# Patient Record
Sex: FEMALE | Race: BLACK OR AFRICAN AMERICAN | Employment: OTHER | URBAN - METROPOLITAN AREA
[De-identification: names, ages, dates, MRNs, and addresses within clinical notes are randomized per-mention and may not be internally consistent; named-entity substitution may affect disease eponyms.]

---

## 2017-01-12 ENCOUNTER — HOSPITAL ENCOUNTER (EMERGENCY)
Age: 72
Discharge: HOME | End: 2017-01-12
Attending: EMERGENCY MEDICINE | Admitting: EMERGENCY MEDICINE
Payer: MEDICARE

## 2017-01-12 VITALS
HEIGHT: 65 IN | RESPIRATION RATE: 18 BRPM | OXYGEN SATURATION: 99 % | HEART RATE: 88 BPM | TEMPERATURE: 97.6 F | SYSTOLIC BLOOD PRESSURE: 150 MMHG | WEIGHT: 220 LBS | BODY MASS INDEX: 36.65 KG/M2 | DIASTOLIC BLOOD PRESSURE: 77 MMHG
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
## 2017-01-12 DIAGNOSIS — S20.01XA: Primary | ICD-10-CM

## 2017-01-12 PROCEDURE — 99283 EMERGENCY DEPT VISIT LOW MDM: CPT
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
## 2017-01-12 NOTE — ED NOTES
Pt evaluated by DR. Hatch and D/C [place]. Patient (s)  given copy of dc instructions and 2 script(s). Patient(s)  verbalized understanding of instructions and script (s). Patient given a current medication reconciliation form and verbalized understanding of their medications. Patient (s) verbalized understanding of the importance of discussing medications with  his or her physician or clinic when they follow up. Patient alert and oriented and in no acute distress. Pt verbalizes pain scale of 0 out of 10. Patient discharged home ambulatory with self  .
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
## 2017-01-16 ENCOUNTER — OFFICE VISIT (OUTPATIENT)
Dept: FAMILY MEDICINE CLINIC | Age: 72
End: 2017-01-16

## 2017-01-16 VITALS
HEIGHT: 65 IN | RESPIRATION RATE: 17 BRPM | TEMPERATURE: 97 F | BODY MASS INDEX: 38.49 KG/M2 | DIASTOLIC BLOOD PRESSURE: 73 MMHG | WEIGHT: 231 LBS | HEART RATE: 91 BPM | OXYGEN SATURATION: 98 % | SYSTOLIC BLOOD PRESSURE: 140 MMHG
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
## 2017-01-16 DIAGNOSIS — T14.8XXA BRUISE: ICD-10-CM

## 2017-01-16 DIAGNOSIS — S20.00XA BRUISE OF BREAST: Primary | ICD-10-CM

## 2017-01-16 NOTE — PATIENT INSTRUCTIONS
Bruises: Care Instructions  Your Care Instructions    Bruises occur when small blood vessels under the skin tear or rupture, most often from a twist, bump, or fall. Blood leaks into tissues under the skin and causes a black-and-blue spot that often turns colors, including purplish black, reddish blue, or yellowish green, as the bruise heals. Bruises hurt, but most are not serious and will go away on their own within 2 to 4 weeks. Sometimes, gravity causes them to spread down the body. A leg bruise usually will take longer to heal than a bruise on the face or arms. Follow-up care is a key part of your treatment and safety. Be sure to make and go to all appointments, and call your doctor if you are having problems. Its also a good idea to know your test results and keep a list of the medicines you take. How can you care for yourself at home? · Take pain medicines exactly as directed. ¨ If the doctor gave you a prescription medicine for pain, take it as prescribed. ¨ If you are not taking a prescription pain medicine, ask your doctor if you can take an over-the-counter medicine. · Put ice or a cold pack on the area for 10 to 20 minutes at a time. Put a thin cloth between the ice and your skin. · If you can, prop up the bruised area on pillows as much as possible for the next few days. Try to keep the bruise above the level of your heart. When should you call for help? Call your doctor now or seek immediate medical care if:  · You have signs of infection, such as:  ¨ Increased pain, swelling, warmth, or redness. ¨ Red streaks leading from the bruise. ¨ Pus draining from the bruise. ¨ A fever. · You have a bruise on your leg and signs of a blood clot, such as:  ¨ Increasing redness and swelling along with warmth, tenderness, and pain in the bruised area. ¨ Pain in your calf, back of the knee, thigh, or groin. ¨ Redness and swelling in your leg or groin. · Your pain gets worse.   Watch closely for changes in your health, and be sure to contact your doctor if:  · You do not get better as expected. Where can you learn more? Go to http://afshan-nikki.info/. Enter [phone] in the search box to learn more about \"Bruises: Care Instructions. \"  Current as of: May 27, 2016  Content Version: 11.1  © 8440-9217 [employer]. Care instructions adapted under license by [employer] (which disclaims liability or warranty for this information). If you have questions about a medical condition or this instruction, always ask your healthcare professional. [place] [address] any warranty or liability for your use of this information. Chest Contusion: Care Instructions  Your Care Instructions  A chest contusion, or bruise, is caused by a fall or direct blow to the chest. Car crashes, falls, getting punched, and injury from bicycle handlebars are common causes of chest contusions. A very forceful blow to the chest can injure the heart or blood vessels in the chest, the lungs, the airway, the liver, or the spleen. Pain may be caused by an injury to muscles, cartilage, or ribs. Deep breathing, coughing, or sneezing can increase your pain. Lying on the injured area also can cause pain. Follow-up care is a key part of your treatment and safety. Be sure to make and go to all appointments, and call your doctor if you are having problems. It's also a good idea to know your test results and keep a list of the medicines you take. How can you care for yourself at home? · Rest and protect the injured or sore area. Stop, change, or take a break from any activity that may be causing your pain. · Put ice or a cold pack on the area for 10 to 20 minutes at a time. Put a thin cloth between the ice and your skin.   · After 2 or 3 days, if your swelling is gone, apply a heating pad set on low or a warm cloth to your chest. Some doctors suggest that you go back and forth between hot and cold. Put a thin cloth between the heating pad and your skin. · Do not wrap or tape your ribs for support. This may cause you to take smaller breaths, which could increase your risk of pneumonia and lung collapse. · Ask your doctor if you can take an over-the-counter pain medicine, such as acetaminophen (Tylenol), ibuprofen (Advil, Motrin), or naproxen (Aleve). Be safe with medicines. Read and follow all instructions on the label. · Take your medicines exactly as prescribed. Call your doctor if you think you are having a problem with your medicine. · Gentle stretching and massage may help you feel better after a few days of rest. Stretch slowly to the point just before discomfort begins, then hold the stretch for at least 15 to 30 seconds. Do this 3 or 4 times per day. · As your pain gets better, slowly return to your normal activities. Be patient, because chest bruises can take weeks or months to heal. Any increased pain may be a sign that you need to rest a while longer. When should you call for help? Call 911 anytime you think you may need emergency care. For example, call if:  · You have severe trouble breathing. · You cough up blood. Call your doctor now or seek immediate medical care if:  · You have belly pain. · You are dizzy or lightheaded, or you feel like you may faint. · You develop new symptoms with the chest pain. · Your chest pain gets worse. · You have a fever. · You have some shortness of breath. · You have a cough that brings up mucus from the lungs. Watch closely for changes in your health, and be sure to contact your doctor if:  · Your chest pain is not improving after 1 week. Where can you learn more? Go to http://afshan-nikki.info/. Enter I174 in the search box to learn more about \"Chest Contusion: Care Instructions. \"  Current as of: May 27, 2016  Content Version: 11.1  © 2950-8464 [employer].  Care instructions adapted under license by [employer] (which disclaims liability or warranty for this information). If you have questions about a medical condition or this instruction, always ask your healthcare professional. [address] any warranty or liability for your use of this information.

## 2017-01-16 NOTE — MR AVS SNAPSHOT
Visit Information Date & Time Provider Department Dept. Phone Encounter #  
 1/16/2017  9:00 AM Priyanka Hernandez NP Carry Lev Jasso 77 [id number] Follow-up Instructions Return if symptoms worsen or fail to improve, for normal follow up. Your Appointments 1/30/2017  9:45 AM  
Follow Up with Ran Cid MD  
[address] (--) Appt Note: fu; fu  
 [address] [address] [phone] [address] [address] [phone]  
  
   
 [place] [address] [id number] [address] [address] 4/19/2017  8:45 AM  
Follow Up with Mayank Matthew MD  
[facility] [address]) Appt Note: 4mon f/u  
 [address] [id number] [phone]  
  
   
 [place] [id number] Upcoming Health Maintenance Date Due DTaP/Tdap/Td series (1 - Tdap) 1/21/1966 Pneumococcal 65+ Low/Medium Risk (1 of 2 - PCV13) 1/21/2008 GLAUCOMA SCREENING Q2Y 11/25/2015 EYE EXAM RETINAL OR DILATED Q1 4/20/2016 MICROALBUMIN Q1 8/17/2016 MEDICARE YEARLY EXAM 1/15/2017 FOOT EXAM Q1 1/15/2017 LIPID PANEL Q1 3/24/2017 HEMOGLOBIN A1C Q6M 6/14/2017 BREAST CANCER SCRN MAMMOGRAM 3/9/2018 COLONOSCOPY 9/20/2019 Allergies as of 1/16/2017  Review Complete On: 1/16/2017 By: Priyanka Hernandez NP Severity Noted Reaction Type Reactions Allegra-d 12 Hour [Fexofenadine-pseudoephedrine]  09/23/2011    Shortness of Breath Zocor [Simvastatin]  06/11/2010    Other (comments) Intolerance, muscle ache Current Immunizations  Reviewed on 9/12/2014 No immunizations on file. Not reviewed this visit Vitals BP Pulse Temp Resp Height(growth percentile) Weight(growth percentile) 140/73 (BP 1 Location: Left arm, BP Patient Position: Sitting) 91 97 °F (36.1 °C) 17 5' 5\" (1.651 m) 231 lb (104.8 kg) SpO2 BMI OB Status Smoking Status 98% 38.44 kg/m2 Hysterectomy Former Smoker BMI and BSA Data Body Mass Index Body Surface Area  
 38.44 kg/m 2 2.19 m 2 Preferred Pharmacy Pharmacy Name Phone [facility] [phone] [phone] Your Updated Medication List  
  
   
This list is accurate as of: 1/16/17  9:37 AM.  Always use your most recent med list.  
  
  
  
  
 aspirin delayed-release 81 mg tablet Take 1 Tab by mouth daily. Blood-Glucose Meter monitoring kit Free style lite glucose meter. Test blood sugar 2 times a day. Dx: E11.65 COD LIVER OIL 4,000 unit-400 unit/5 mL Liqd Generic drug:  vit A and D3 in cod liver oil Take  by mouth. 1 tablespoon daily  
  
 exenatide microspheres 2 mg/0.65 mL Pnij Commonly known as:  BYDUREON  
2 mg by SubCUTAneous route every seven (7) days. glucose blood VI test strips strip Commonly known as:  blood glucose test  
Freestyle lite test strips. Test 2 times a day. Dx: E11.9  
  
 insulin aspart 100 unit/mL Inpn Commonly known as:  NOVOLOG  
0.1 mL by SubCUTAneous route Before breakfast, lunch, and dinner. insulin glargine 100 unit/mL (3 mL) pen Commonly known as:  LANTUS SOLOSTAR  
42 units in the morning and 28 units at night Insulin Needles (Disposable) 31 gauge x 3/16\" Ndle Commonly known as:  BD INSULIN PEN NEEDLE UF MINI Use with Lantus Insulin injections twice a day Lancets Misc Lancets for Free Style lite glucose meter. Test blood sugar 2 times a day. Dx; E11.65  
  
 metFORMIN  mg tablet Commonly known as:  glucophage XR Take 2 Tabs by mouth daily (with dinner). metoprolol succinate 50 mg XL tablet Commonly known as:  TOPROL-XL Take 1 Tab by mouth daily. NIFEdipine ER 30 mg ER tablet Commonly known as:  ADALAT CC Take 2 Tabs by mouth daily. OTHER Ketamine 10%/Gabapentin 6%/ Baclofen 2%/ Cyclobenzaprine 2%/ Lidocaine 2%/ Flurbiprofen 10% Apply 1-2 Grams to the affected area 3-4 Times per day. QTY-180 GM REFILLS-5  
  
 rosuvastatin 5 mg tablet Commonly known as:  CRESTOR Take 1 Tab by mouth nightly. SITagliptin 100 mg tablet Commonly known as:  [place] Take 1 Tab by mouth daily. Follow-up Instructions Return if symptoms worsen or fail to improve, for normal follow up. Patient Instructions Bruises: Care Instructions Your Care Instructions Bruises occur when small blood vessels under the skin tear or rupture, most often from a twist, bump, or fall. Blood leaks into tissues under the skin and causes a black-and-blue spot that often turns colors, including purplish black, reddish blue, or yellowish green, as the bruise heals. Bruises hurt, but most are not serious and will go away on their own within 2 to 4 weeks. Sometimes, gravity causes them to spread down the body. A leg bruise usually will take longer to heal than a bruise on the face or arms. Follow-up care is a key part of your treatment and safety. Be sure to make and go to all appointments, and call your doctor if you are having problems. Its also a good idea to know your test results and keep a list of the medicines you take. How can you care for yourself at home? · Take pain medicines exactly as directed. ¨ If the doctor gave you a prescription medicine for pain, take it as prescribed. ¨ If you are not taking a prescription pain medicine, ask your doctor if you can take an over-the-counter medicine. · Put ice or a cold pack on the area for 10 to 20 minutes at a time. Put a thin cloth between the ice and your skin. · If you can, prop up the bruised area on pillows as much as possible for the next few days. Try to keep the bruise above the level of your heart. When should you call for help? Call your doctor now or seek immediate medical care if: 
· You have signs of infection, such as: ¨ Increased pain, swelling, warmth, or redness. ¨ Red streaks leading from the bruise. ¨ Pus draining from the bruise. ¨ A fever. · You have a bruise on your leg and signs of a blood clot, such as: 
¨ Increasing redness and swelling along with warmth, tenderness, and pain in the bruised area. ¨ Pain in your calf, back of the knee, thigh, or groin. ¨ Redness and swelling in your leg or groin. · Your pain gets worse. Watch closely for changes in your health, and be sure to contact your doctor if: 
· You do not get better as expected. Where can you learn more? Go to http://afshanPersystent Technologiesnikki.info/. Enter [phone] in the search box to learn more about \"Bruises: Care Instructions. \" Current as of: May 27, 2016 Content Version: 11.1 © 6536-2212 [employer]. Care instructions adapted under license by [employer] (which disclaims liability or warranty for this information). If you have questions about a medical condition or this instruction, always ask your healthcare professional. [place] [address] any warranty or liability for your use of this information. Chest Contusion: Care Instructions Your Care Instructions A chest contusion, or bruise, is caused by a fall or direct blow to the chest. Car crashes, falls, getting punched, and injury from bicycle handlebars are common causes of chest contusions. A very forceful blow to the chest can injure the heart or blood vessels in the chest, the lungs, the airway, the liver, or the spleen. Pain may be caused by an injury to muscles, cartilage, or ribs. Deep breathing, coughing, or sneezing can increase your pain. Lying on the injured area also can cause pain. Follow-up care is a key part of your treatment and safety. Be sure to make and go to all appointments, and call your doctor if you are having problems. It's also a good idea to know your test results and keep a list of the medicines you take. How can you care for yourself at home? · Rest and protect the injured or sore area. Stop, change, or take a break from any activity that may be causing your pain. · Put ice or a cold pack on the area for 10 to 20 minutes at a time. Put a thin cloth between the ice and your skin. · After 2 or 3 days, if your swelling is gone, apply a heating pad set on low or a warm cloth to your chest. Some doctors suggest that you go back and forth between hot and cold. Put a thin cloth between the heating pad and your skin. · Do not wrap or tape your ribs for support. This may cause you to take smaller breaths, which could increase your risk of pneumonia and lung collapse. · Ask your doctor if you can take an over-the-counter pain medicine, such as acetaminophen (Tylenol), ibuprofen (Advil, Motrin), or naproxen (Aleve). Be safe with medicines. Read and follow all instructions on the label. · Take your medicines exactly as prescribed. Call your doctor if you think you are having a problem with your medicine. · Gentle stretching and massage may help you feel better after a few days of rest. Stretch slowly to the point just before discomfort begins, then hold the stretch for at least 15 to 30 seconds. Do this 3 or 4 times per day. · As your pain gets better, slowly return to your normal activities. Be patient, because chest bruises can take weeks or months to heal. Any increased pain may be a sign that you need to rest a while longer. When should you call for help? Call 911 anytime you think you may need emergency care. For example, call if: 
· You have severe trouble breathing. · You cough up blood. Call your doctor now or seek immediate medical care if: 
· You have belly pain. · You are dizzy or lightheaded, or you feel like you may faint. · You develop new symptoms with the chest pain. · Your chest pain gets worse. · You have a fever. · You have some shortness of breath. · You have a cough that brings up mucus from the lungs. Watch closely for changes in your health, and be sure to contact your doctor if: 
· Your chest pain is not improving after 1 week. Where can you learn more? Go to http://afshan-nikki.info/. Enter I174 in the search box to learn more about \"Chest Contusion: Care Instructions. \" Current as of: May 27, 2016 Content Version: 11.1 © 5842-3125 [employer]. Care instructions adapted under license by [employer] (which disclaims liability or warranty for this information). If you have questions about a medical condition or this instruction, always ask your healthcare professional. [address] any warranty or liability for your use of this information. Please provide this summary of care documentation to your next provider. Your primary care clinician is listed as BRENNA WEAVER. If you have any questions after today's visit, please call [phone].
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
## 2017-01-27 ENCOUNTER — HOSPITAL ENCOUNTER (OUTPATIENT)
Dept: LAB | Age: 72
Discharge: HOME | End: 2017-01-27
Payer: MEDICARE

## 2017-01-27 DIAGNOSIS — Z51.81 MEDICATION MONITORING ENCOUNTER: ICD-10-CM

## 2017-01-27 DIAGNOSIS — I10 ESSENTIAL HYPERTENSION WITH GOAL BLOOD PRESSURE LESS THAN 140/90: ICD-10-CM

## 2017-01-27 DIAGNOSIS — E78.5 HYPERLIPIDEMIA, UNSPECIFIED HYPERLIPIDEMIA TYPE: ICD-10-CM

## 2017-01-27 LAB
ALBUMIN SERPL BCP-MCNC: 3.7 G/DL (ref 3.4–5)
ALBUMIN/GLOB SERPL: 1 {RATIO} (ref 0.8–1.7)
ALP SERPL-CCNC: 107 U/L (ref 45–117)
ALT SERPL-CCNC: 21 U/L (ref 13–56)
ANION GAP BLD CALC-SCNC: 10 MMOL/L (ref 3–18)
AST SERPL W P-5'-P-CCNC: 14 U/L (ref 15–37)
BILIRUB SERPL-MCNC: 0.6 MG/DL (ref 0.2–1)
BUN SERPL-MCNC: 14 MG/DL (ref 7–18)
BUN/CREAT SERPL: 12 (ref 12–20)
CALCIUM SERPL-MCNC: 8.8 MG/DL (ref 8.5–10.1)
CHLORIDE SERPL-SCNC: 103 MMOL/L (ref 100–108)
CHOLEST SERPL-MCNC: 160 MG/DL
CO2 SERPL-SCNC: 28 MMOL/L (ref 21–32)
CREAT SERPL-MCNC: 1.16 MG/DL (ref 0.6–1.3)
GLOBULIN SER CALC-MCNC: 3.8 G/DL (ref 2–4)
GLUCOSE SERPL-MCNC: 103 MG/DL (ref 74–99)
HBA1C MFR BLD: 10 % (ref 4.2–5.6)
HDLC SERPL-MCNC: 56 MG/DL (ref 40–60)
HDLC SERPL: 2.9 {RATIO} (ref 0–5)
LDLC SERPL CALC-MCNC: 86 MG/DL (ref 0–100)
LIPID PROFILE,FLP: NORMAL
POTASSIUM SERPL-SCNC: 4.1 MMOL/L (ref 3.5–5.5)
PROT SERPL-MCNC: 7.5 G/DL (ref 6.4–8.2)
SODIUM SERPL-SCNC: 141 MMOL/L (ref 136–145)
TRIGL SERPL-MCNC: 90 MG/DL (ref ?–150)
VLDLC SERPL CALC-MCNC: 18 MG/DL

## 2017-01-27 PROCEDURE — 83036 HEMOGLOBIN GLYCOSYLATED A1C: CPT | Performed by: FAMILY MEDICINE

## 2017-01-27 PROCEDURE — 80053 COMPREHEN METABOLIC PANEL: CPT | Performed by: FAMILY MEDICINE

## 2017-01-27 PROCEDURE — 36415 COLL VENOUS BLD VENIPUNCTURE: CPT | Performed by: FAMILY MEDICINE

## 2017-01-27 PROCEDURE — 80061 LIPID PANEL: CPT | Performed by: FAMILY MEDICINE

## 2017-01-27 PROCEDURE — 82043 UR ALBUMIN QUANTITATIVE: CPT | Performed by: FAMILY MEDICINE

## 2017-01-28 ENCOUNTER — HOSPITAL ENCOUNTER (OUTPATIENT)
Dept: LAB | Age: 72
Discharge: HOME | End: 2017-01-28
Payer: MEDICARE

## 2017-01-28 LAB
CREAT UR-MCNC: 150 MG/DL (ref 30–125)
MICROALBUMIN UR-MCNC: 3.23 MG/DL (ref 0–3)
MICROALBUMIN/CREAT UR-RTO: 22 MG/G (ref 0–30)

## 2017-01-28 PROCEDURE — 82043 UR ALBUMIN QUANTITATIVE: CPT | Performed by: FAMILY MEDICINE

## 2017-01-28 PROCEDURE — 36415 COLL VENOUS BLD VENIPUNCTURE: CPT | Performed by: FAMILY MEDICINE

## 2017-01-30 ENCOUNTER — OFFICE VISIT (OUTPATIENT)
Dept: FAMILY MEDICINE CLINIC | Age: 72
End: 2017-01-30

## 2017-01-30 VITALS
BODY MASS INDEX: 38.86 KG/M2 | TEMPERATURE: 98 F | SYSTOLIC BLOOD PRESSURE: 138 MMHG | WEIGHT: 233.25 LBS | HEART RATE: 103 BPM | DIASTOLIC BLOOD PRESSURE: 70 MMHG | HEIGHT: 65 IN | RESPIRATION RATE: 20 BRPM

## 2017-01-30 DIAGNOSIS — N64.4 BREAST PAIN, RIGHT: ICD-10-CM

## 2017-01-30 DIAGNOSIS — S20.01XA POSTTRAUMATIC HEMATOMA OF RIGHT BREAST, INITIAL ENCOUNTER: Primary | ICD-10-CM
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
## 2017-01-31 RX ORDER — INSULIN GLARGINE 100 [IU]/ML
INJECTION, SOLUTION SUBCUTANEOUS
Qty: 3 EACH | Refills: 3 | Status: SHIPPED | OUTPATIENT
Start: 2017-01-31 | End: 2017-12-13 | Stop reason: SDUPTHER

## 2017-02-07 ENCOUNTER — HOSPITAL ENCOUNTER (OUTPATIENT)
Dept: MAMMOGRAPHY | Age: 72
Discharge: HOME | End: 2017-02-07

## 2017-02-07 ENCOUNTER — HOSPITAL ENCOUNTER (OUTPATIENT)
Dept: ULTRASOUND IMAGING | Age: 72
Discharge: HOME | End: 2017-02-07
Attending: FAMILY MEDICINE
Payer: MEDICARE

## 2017-02-07 ENCOUNTER — OFFICE VISIT (OUTPATIENT)
Dept: FAMILY MEDICINE CLINIC | Age: 72
End: 2017-02-07

## 2017-02-07 VITALS
HEIGHT: 65 IN | TEMPERATURE: 97.9 F | WEIGHT: 233 LBS | HEART RATE: 96 BPM | BODY MASS INDEX: 38.82 KG/M2 | DIASTOLIC BLOOD PRESSURE: 97 MMHG | SYSTOLIC BLOOD PRESSURE: 185 MMHG

## 2017-02-07 DIAGNOSIS — T14.8XXA TRAUMATIC HEMATOMA: Primary | ICD-10-CM

## 2017-02-07 DIAGNOSIS — N64.4 BREAST PAIN: ICD-10-CM

## 2017-02-07 DIAGNOSIS — S20.01XA: ICD-10-CM

## 2017-02-07 DIAGNOSIS — N64.4 BREAST PAIN, RIGHT: ICD-10-CM
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
## 2017-02-07 DIAGNOSIS — S20.01XA POSTTRAUMATIC HEMATOMA OF RIGHT BREAST, INITIAL ENCOUNTER: ICD-10-CM

## 2017-02-07 PROCEDURE — 76642 ULTRASOUND BREAST LIMITED: CPT

## 2017-02-07 NOTE — PROGRESS NOTES
Chief Complaint   Patient presents with    Other     discuss mammogram     Pt wants to discuss why a mammogram was ordered, at last visit only a Ultrasound was ordered.
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
## 2017-03-08 ENCOUNTER — OFFICE VISIT (OUTPATIENT)
Dept: FAMILY MEDICINE CLINIC | Age: 72
End: 2017-03-08

## 2017-03-08 VITALS
BODY MASS INDEX: 38.7 KG/M2 | SYSTOLIC BLOOD PRESSURE: 129 MMHG | DIASTOLIC BLOOD PRESSURE: 65 MMHG | WEIGHT: 232.25 LBS | HEART RATE: 103 BPM | RESPIRATION RATE: 20 BRPM | TEMPERATURE: 97.6 F | HEIGHT: 65 IN

## 2017-03-08 DIAGNOSIS — Z71.89 ACP (ADVANCE CARE PLANNING): ICD-10-CM

## 2017-03-08 DIAGNOSIS — H26.9 CATARACT: ICD-10-CM

## 2017-03-08 DIAGNOSIS — Z13.39 SCREENING FOR ALCOHOLISM: ICD-10-CM

## 2017-03-08 DIAGNOSIS — Z71.89 ADVANCED DIRECTIVES, COUNSELING/DISCUSSION: ICD-10-CM

## 2017-03-08 DIAGNOSIS — R92.8 ABNORMAL MAMMOGRAM OF RIGHT BREAST: ICD-10-CM

## 2017-03-08 DIAGNOSIS — S20.01XS POSTTRAUMATIC HEMATOMA OF BREAST, RIGHT, SEQUELA: ICD-10-CM

## 2017-03-08 DIAGNOSIS — Z12.11 SCREEN FOR COLON CANCER: ICD-10-CM

## 2017-03-08 DIAGNOSIS — Z00.00 ROUTINE GENERAL MEDICAL EXAMINATION AT A HEALTH CARE FACILITY: Primary | ICD-10-CM

## 2017-03-08 DIAGNOSIS — I10 ESSENTIAL HYPERTENSION: ICD-10-CM

## 2017-03-08 DIAGNOSIS — Z13.31 SCREENING FOR DEPRESSION: ICD-10-CM

## 2017-03-08 NOTE — PATIENT INSTRUCTIONS
Please contact our office if you have any questions about your visit today. Medicare Part B Preventive Services Limitations Recommendation Scheduled   Bone Mass Measurement  (age 72 & older, biennial) Requires diagnosis related to osteoporosis or estrogen deficiency. Biennial benefit unless patient has history of long-term glucocorticoid tx or baseline is needed because initial test was by other method     Cardiovascular Screening Blood Tests (every 5 years)  Total cholesterol, HDL, Triglycerides Order as a panel if possible     Colorectal Cancer Screening  -Fecal occult blood test (annual)  -Flexible sigmoidoscopy (5y)  -Screening colonoscopy (10y)  -Barium Enema      Counseling to Prevent Tobacco Use (up to 8 sessions per year)  - Counseling greater than 3 and up to 10 minutes  - Counseling greater than 10 minutes Patients must be asymptomatic of tobacco-related conditions to receive as preventive service     Diabetes Screening Tests (at least every 3 years, Medicare covers annually or at 6-month intervals for prediabetic patients)    Fasting blood sugar (FBS) or glucose tolerance test (GTT) Patient must be diagnosed with one of the following:  -Hypertension, Dyslipidemia, obesity, previous impaired FBS or GTT  Or any two of the following: overweight, FH of diabetes, age ? 72, history of gestational diabetes, birth of baby weighing more than 9 pounds     Diabetes Self-Management Training (DSMT) (no USPSTF recommendation) Requires referral by treating physician for patient with diabetes or renal disease. 10 hours of initial DSMT session of no less than 30 minutes each in a continuous 12-month period. 2 hours of follow-up DSMT in subsequent years.      Glaucoma Screening (no USPSTF recommendation) Diabetes mellitus, family history, , age 48 or over,  American, age 72 or over     Human Immunodeficiency Virus (HIV) Screening (annually for increased risk patients)  HIV-1 and HIV-2 by EIA, MARGIE, rapid antibody test, or oral mucosa transudate Patient must be at increased risk for HIV infection per USPSTF guidelines or pregnant. Tests covered annually for patients at increased risk. Pregnant patients may receive up to 3 test during pregnancy. Medical Nutrition Therapy (MNT) (for diabetes or renal disease not recommended schedule) Requires referral by treating physician for patient with diabetes or renal disease. Can be provided in same year as diabetes self-management training (DSMT), and CMS recommends medical nutrition therapy take place after DSMT. Up to 3 hours for initial year and 2 hours in subsequent years. Shingles Vaccination A shingles vaccine is also recommended once in a lifetime after age 61     Seasonal Influenza Vaccination (annually)      Pneumococcal Vaccination (once after 72)      Hepatitis B Vaccinations (if medium/high risk) Medium/high risk factors:  End-stage renal disease,  Hemophiliacs who received Factor VIII or IX concentrates, Clients of institutions for the mentally retarded, Persons who live in the same house as a HepB virus carrier, Homosexual men, Illicit injectable drug abusers. Screening Mammography (biennial age 54-69) Annually (age 36 or over)     Screening Pap Tests and Pelvic Examination (up to age 79 and after 79 if unknown history or abnormal study last 10 years) Every 25 months except high risk     Ultrasound Screening for Abdominal Aortic Aneurysm (AAA) (once) Patient must be referred through [facility] and not have had a screening for abdominal aortic aneurysm before under Medicare.   Limited to patients who meet one of the following criteria:  - Men who are 73-68 years old and have smoked more than 100 cigarettes in their lifetime.  -Anyone with a FH of AAA  -Anyone recommended for screening by USPSTF

## 2017-03-08 NOTE — PROGRESS NOTES
This is a Subsequent Medicare Annual Wellness Visit providing Personalized Prevention Plan Services (PPPS) (Performed 12 months after initial AWV and PPPS )    I have reviewed the patient's medical history in detail and updated the computerized patient record. History     Past Medical History:   Diagnosis Date    Anemia NEC     Chest pain, atypical     CKD (chronic kidney disease)     Constipation     Diabetes (HCC)     Ectopic pregnancy     History of Holter monitoring 10/14/2013    Baseline sinus rhythm w/extremely infrequent PVCs, which are likely cause of patient's symptoms.  Hyperlipidemia     Hypertension     Normal stress echo 09/24/2010    Normal study after maximal exercise. No symptoms. Ex time 6 min. EF 60%. Past Surgical History:   Procedure Laterality Date    COLONOSCOPY N/A 9/15/2016    COLONOSCOPYwith polypectomies performed by Em Morrison [facility] at [facility] ENDOSCOPY    HX HYSTERECTOMY       Current Outpatient Prescriptions   Medication Sig Dispense Refill    insulin glargine (LANTUS SOLOSTAR) 100 unit/mL (3 mL) pen 45 units in the morning and 30 units at night 3 Each 3    insulin aspart (NOVOLOG) 100 unit/mL inpn 0.1 mL by SubCUTAneous route Before breakfast, lunch, and dinner. 5 Pen 6    SITagliptin (JANUVIA) 100 mg tablet Take 1 Tab by mouth daily. 90 Tab 3    aspirin delayed-release 81 mg tablet Take 1 Tab by mouth daily. 90 Tab 3    glucose blood VI test strips (BLOOD GLUCOSE TEST) strip Freestyle lite test strips. Test 2 times a day. Dx: E11.9 200 Strip 3    Blood-Glucose Meter monitoring kit Free style lite glucose meter. Test blood sugar 2 times a day. Dx: E11.65 1 Kit 0    Lancets misc Lancets for Free Style lite glucose meter. Test blood sugar 2 times a day.  Dx; E11.65 200 Each 3    Insulin Needles, Disposable, (BD INSULIN PEN NEEDLE UF MINI) 31 gauge x 3/16\" ndle Use with Lantus Insulin injections twice a day 200 Pen Needle 3    NIFEdipine ER (ADALAT CC) 30 mg ER tablet Take 2 Tabs by mouth daily. 180 Tab 3    rosuvastatin (CRESTOR) 5 mg tablet Take 1 Tab by mouth nightly. 90 Tab 3    OTHER Ketamine 10%/Gabapentin 6%/ Baclofen 2%/ Cyclobenzaprine 2%/ Lidocaine 2%/ Flurbiprofen 10%  Apply 1-2 Grams to the affected area 3-4 Times per day. QTY-180 GM  REFILLS-5      vit A & D3 in cod liver oil (COD LIVER OIL) 4,000 unit-400 unit/5 mL Liqd Take  by mouth. 1 tablespoon daily      metoprolol succinate (TOPROL-XL) 50 mg XL tablet Take 1 Tab by mouth daily. 30 Tab 1    exenatide microspheres (BYDUREON) 2 mg/0.65 mL pnij 2 mg by SubCUTAneous route every seven (7) days. 12 mL 3    metFORMIN ER (GLUCOPHAGE XR) 500 mg tablet Take 2 Tabs by mouth daily (with dinner). 180 Tab 3     Allergies   Allergen Reactions    Allegra-D 12 Hour [Fexofenadine-Pseudoephedrine] Shortness of Breath    Zocor [Simvastatin] Other (comments)     Intolerance, muscle ache     Family History   Problem Relation Age of Onset    Heart Attack Mother     Other Mother      CAD    Diabetes Mother     Diabetes Sister     HIV/AIDS Brother     Hypertension Sister     Stroke Sister     Diabetes Sister     Seizures Sister     Heart Failure Brother      Social History   Substance Use Topics    Smoking status: Former Smoker    Smokeless tobacco: Never Used    Alcohol use No     Patient Active Problem List   Diagnosis Code    Hyperlipidemia E78.5    Diabetes mellitus type 2 in obese ([facility] [address] E11.9, E66.9    Hypertension I10    Constipation K59.00    Abdominal pain, epigastric R10.13    Elevated CPK R74.8    Diabetes (HCC) E11.9    CKD (chronic kidney disease) N18.9    Type II diabetes mellitus, uncontrolled (HCC) E11.65       Depression Risk Factor Screening:     PHQ 2 / 9, over the last two weeks 3/25/2016   Little interest or pleasure in doing things Not at all   Feeling down, depressed or hopeless Not at all   Total Score PHQ 2 0     Alcohol Risk Factor Screening:    On any occasion during the past 3 months, have you had more than 3 drinks containing alcohol? No    Do you average more than 7 drinks per week? No      Functional Ability and Level of Safety:     Hearing Loss   none    Activities of Daily Living   Self-care. Requires assistance with: no ADLs    Fall Risk     Fall Risk Assessment, last 12 mths 3/25/2016   Able to walk? Yes   Fall in past 12 months? No   Fall with injury? -   Number of falls in past 12 months -   Fall Risk Score -     Abuse Screen   Patient is not abused    Review of Systems   See additional note    Physical Examination     Evaluation of Cognitive Function:  Mood/affect:  neutral  Appearance: age appropriate  Family member/caregiver input: none    Nursing note and vitals reviewed. Constitutional: She is oriented to person, place, and time. She appears well-developed and well-nourished. No distress. HENT:   Mouth/Throat: Oropharynx is clear and moist.   Neck: No JVD present. No thyromegaly present. Cardiovascular: Normal rate, regular rhythm and normal heart sounds. Pulmonary/Chest: Effort normal and breath sounds normal. No respiratory distress. She has no wheezes. She has no rales. Musculoskeletal: She exhibits no edema. Lymphadenopathy:     She has no cervical adenopathy. Neurological: She is alert and oriented to person, place, and time. Coordination normal.   Psychiatric: She has a normal mood and affect. Her behavior is normal.       Patient Care Team:  Ra Hutton MD as PCP - General    Advice/Referrals/Counseling   Education and counseling provided:  Are appropriate based on today's review and evaluation  End-of-Life planning (with patient's consent)  Pneumococcal Vaccine  Influenza Vaccine  Screening Mammography  Colorectal cancer screening tests  Bone mass measurement (DEXA)  Declined immunizations  DEXA normal 2014  Colonoscopy utd, ordered FIT test    Assessment/Plan       ICD-10-CM ICD-9-CM    1.  Routine general medical examination at a health care facility Z00.00 V70.0    2. Screening for depression Z13.89 V79.0 DEPRESSION SCREEN ANNUAL   3. Screening for alcoholism Z13.89 V79.1    4. Screen for colon cancer Z12.11 V76.51 OCCULT BLOOD, IMMUNOASSAY (FIT)   5. Advanced directives, counseling/discussion Z71.89 V65.49 ADVANCE CARE PLANNING FIRST 30 MINS   6.  ACP (advance care planning) Z71.89 V65.49 ADVANCE CARE PLANNING FIRST 30 MINS
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
## 2017-03-08 NOTE — ACP (ADVANCE CARE PLANNING)
Advance Care Planning      Advance Care Planning    Advance Care Planning (ACP) Provider Note - Comprehensive     Date of ACP Conversation: 03/08//17  Persons included in Conversation:  patient  Length of ACP Conversation in minutes:  16 minutes    Authorized Decision Maker (if patient is incapable of making informed decisions): This person is:  none          General ACP for ALL Patients with Decision Making Capacity:   Importance of advance care planning, including choosing a healthcare agent to communicate patient's healthcare decisions if patient lost the ability to make decisions, such as after a sudden illness or accident  Understanding of the healthcare agent role was assessed and information provided  Exploration of values, goals, and preferences if recovery is not expected, even with continued medical treatment in the event of: Imminent death  Severe, permanent brain injury  Opportunity offered to explore how cultural, Church, spiritual, or personal beliefs would affect decisions for future care     Review of Existing Advance Directive:  none    For Serious or Chronic Illness:  No known illness    Interventions Provided:  Recommended completion of Advance Directive form after review of ACP materials and conversation with prospective healthcare agent   Recommended communicating the plan and making copies for the healthcare agent, personal physician, and others as appropriate (e.g., health system)  Recommended review of completed ACP document annually or upon change in health status Discussed in detail [address] with patient. Discussed in detail [address] with patient. Patient is undecided at the present time and handouts given for pt to decide on disposition.

## 2017-04-10 ENCOUNTER — HOSPITAL ENCOUNTER (OUTPATIENT)
Dept: MAMMOGRAPHY | Age: 72
Discharge: HOME | End: 2017-04-10
Attending: FAMILY MEDICINE
Payer: MEDICARE

## 2017-04-10 ENCOUNTER — HOSPITAL ENCOUNTER (OUTPATIENT)
Dept: ULTRASOUND IMAGING | Age: 72
Discharge: HOME | End: 2017-04-10
Attending: FAMILY MEDICINE
Payer: MEDICARE

## 2017-04-10 DIAGNOSIS — S20.01XS POSTTRAUMATIC HEMATOMA OF BREAST, RIGHT, SEQUELA: ICD-10-CM

## 2017-04-10 DIAGNOSIS — R92.8 ABNORMAL MAMMOGRAM OF RIGHT BREAST: ICD-10-CM

## 2017-04-10 PROCEDURE — 76642 ULTRASOUND BREAST LIMITED: CPT

## 2017-04-14 ENCOUNTER — HOSPITAL ENCOUNTER (OUTPATIENT)
Dept: MAMMOGRAPHY | Age: 72
Discharge: HOME | End: 2017-04-14
Attending: FAMILY MEDICINE
Payer: MEDICARE

## 2017-04-14 DIAGNOSIS — N64.4 BREAST PAIN: ICD-10-CM

## 2017-04-14 PROCEDURE — 77062 BREAST TOMOSYNTHESIS BI: CPT

## 2017-04-17 ENCOUNTER — OFFICE VISIT (OUTPATIENT)
Dept: FAMILY MEDICINE CLINIC | Age: 72
End: 2017-04-17

## 2017-04-17 VITALS
RESPIRATION RATE: 20 BRPM | BODY MASS INDEX: 39.4 KG/M2 | HEART RATE: 102 BPM | SYSTOLIC BLOOD PRESSURE: 122 MMHG | DIASTOLIC BLOOD PRESSURE: 60 MMHG | WEIGHT: 236.5 LBS | HEIGHT: 65 IN | TEMPERATURE: 97.3 F

## 2017-04-17 DIAGNOSIS — N64.1 FAT NECROSIS OF BREAST: ICD-10-CM

## 2017-04-17 DIAGNOSIS — L98.491 ULCER OF FINGER, LIMITED TO BREAKDOWN OF SKIN (HCC): ICD-10-CM

## 2017-04-17 DIAGNOSIS — E78.5 HYPERLIPIDEMIA, UNSPECIFIED HYPERLIPIDEMIA TYPE: ICD-10-CM

## 2017-04-17 DIAGNOSIS — I10 ESSENTIAL HYPERTENSION: ICD-10-CM

## 2017-04-17 LAB
GLUCOSE POC: 229 MG/DL
HBA1C MFR BLD HPLC: 9.2 %

## 2017-04-17 NOTE — PROGRESS NOTES
Chief Complaint   Patient presents with    Follow-up     1 month f/u on hematoma; breast    Hypertension    Diabetes    Puncture Wound     middle right finger       Health Maintenance reviewed     1. Have you been to the ER, urgent care clinic since your last visit? Hospitalized since your last visit? No    2. Have you seen or consulted any other health care providers outside of the [address] [place] [address] since your last visit? Include any pap smears or colon screening.  No
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
## 2017-04-19 ENCOUNTER — OFFICE VISIT (OUTPATIENT)
Dept: NEUROLOGY | Age: 72
End: 2017-04-19

## 2017-04-19 VITALS
HEIGHT: 65 IN | WEIGHT: 234.6 LBS | BODY MASS INDEX: 39.09 KG/M2 | SYSTOLIC BLOOD PRESSURE: 130 MMHG | TEMPERATURE: 98.6 F | OXYGEN SATURATION: 98 % | HEART RATE: 90 BPM | DIASTOLIC BLOOD PRESSURE: 72 MMHG

## 2017-04-19 DIAGNOSIS — R41.3 MEMORY LOSS: ICD-10-CM

## 2017-04-19 DIAGNOSIS — I10 ESSENTIAL HYPERTENSION: ICD-10-CM

## 2017-04-19 DIAGNOSIS — R20.9 DISTURBANCE OF SKIN SENSATION: Primary | ICD-10-CM

## 2017-04-19 DIAGNOSIS — E66.9 DIABETES MELLITUS TYPE 2 IN OBESE (HCC): ICD-10-CM

## 2017-04-19 DIAGNOSIS — E11.69 DIABETES MELLITUS TYPE 2 IN OBESE (HCC): ICD-10-CM

## 2017-04-19 NOTE — MR AVS SNAPSHOT
Visit Information Date & Time Provider Department Dept. Phone Encounter #  
 4/19/2017  8:45 AM Lottie Libman, MD [address] [phone] [id number] Follow-up Instructions Return in about 6 months (around 10/19/2017). Follow-up and Disposition History Your Appointments 5/30/2017  8:00 AM  
Follow Up with Fan Alvarado MD  
[facility] (--) Appt Note: milagro Manzano [address] [address]  
  
    
 10/20/2017  8:45 AM  
Follow Up with Lottie Libman, MD  
[address]-[facility]) Appt Note: 6mon f/u  
 [address] [facility] [address] [facility] [address] [phone]  
  
   
 [facility] [address] Upcoming Health Maintenance Date Due DTaP/Tdap/Td series (1 - Tdap) 1/21/1966 GLAUCOMA SCREENING Q2Y 11/25/2015 EYE EXAM RETINAL OR DILATED Q1 4/20/2016 HEMOGLOBIN A1C Q6M 10/17/2017 LIPID PANEL Q1 1/27/2018 MICROALBUMIN Q1 1/28/2018 Pneumococcal 65+ Low/Medium Risk (2 of 2 - PPSV23) 3/8/2018 FOOT EXAM Q1 3/8/2018 MEDICARE YEARLY EXAM 3/9/2018 COLONOSCOPY 9/20/2019 Allergies as of 4/19/2017  Review Complete On: 4/19/2017 By: Lottie Libman, MD  
  
 Severity Noted Reaction Type Reactions Allegra-d 12 Hour [Fexofenadine-pseudoephedrine]  09/23/2011    Shortness of Breath Zocor [Simvastatin]  06/11/2010    Other (comments) Intolerance, muscle ache Current Immunizations  Reviewed on 3/8/2017 No immunizations on file. Not reviewed this visit You Were Diagnosed With   
  
 Codes Comments Disturbance of skin sensation    -  Primary ICD-10-CM: R20.9 ICD-9-CM: 782.0 Memory loss     ICD-10-CM: R41.3 ICD-9-CM: 780.93 Essential hypertension     ICD-10-CM: I10 
ICD-9-CM: 401.9 Diabetes mellitus type 2 in obese (HCC)     ICD-10-CM: E11.9, E66.9 ICD-9-CM: 250.00, 278.00 Vitals BP Pulse Temp Height(growth percentile) Weight(growth percentile) SpO2  
 130/72 90 98.6 °F (37 °C) (Oral) 5' 5\" (1.651 m) 234 lb 9.6 oz (106.4 kg) 98% BMI OB Status Smoking Status 39.04 kg/m2 Hysterectomy Former Smoker BMI and BSA Data Body Mass Index Body Surface Area 39.04 kg/m 2 2.21 m 2 Preferred Pharmacy Pharmacy Name Phone [facility] [phone] [address] [phone] Your Updated Medication List  
  
   
This list is accurate as of: 4/19/17  9:49 AM.  Always use your most recent med list.  
  
  
  
  
 aspirin delayed-release 81 mg tablet Take 1 Tab by mouth daily. Blood-Glucose Meter monitoring kit Free style lite glucose meter. Test blood sugar 2 times a day. Dx: E11.65 COD LIVER OIL 4,000 unit-400 unit/5 mL Liqd Generic drug:  vit A and D3 in cod liver oil Take  by mouth. 1 tablespoon daily  
  
 glucose blood VI test strips strip Commonly known as:  blood glucose test  
Freestyle lite test strips. Test 2 times a day. Dx: E11.9  
  
 insulin aspart 100 unit/mL Inpn Commonly known as:  NOVOLOG  
0.1 mL by SubCUTAneous route Before breakfast, lunch, and dinner. insulin glargine 100 unit/mL (3 mL) pen Commonly known as:  LANTUS SOLOSTAR  
45 units in the morning and 30 units at night Insulin Needles (Disposable) 31 gauge x 3/16\" Ndle Commonly known as:  BD INSULIN PEN NEEDLE UF MINI Use with Lantus Insulin injections twice a day Lancets Misc Lancets for Free Style lite glucose meter. Test blood sugar 2 times a day. Dx; E11.65  
  
 metFORMIN  mg tablet Commonly known as:  glucophage XR Take 2 Tabs by mouth daily (with dinner). NIFEdipine ER 30 mg ER tablet Commonly known as:  ADALAT CC Take 2 Tabs by mouth daily.   
  
 OTHER  
 Ketamine 10%/Gabapentin 6%/ Baclofen 2%/ Cyclobenzaprine 2%/ Lidocaine 2%/ Flurbiprofen 10% Apply 1-2 Grams to the affected area 3-4 Times per day. QTY-180 GM REFILLS-5  
  
 rosuvastatin 5 mg tablet Commonly known as:  CRESTOR Take 1 Tab by mouth nightly. SITagliptin 100 mg tablet Commonly known as:  Matthew Bienenstock Take 1 Tab by mouth daily. Follow-up Instructions Return in about 6 months (around 10/19/2017). To-Do List   
 08/16/2017 9:30 AM  
  Appointment with [address] [place] [address] at [facility] ([phone]) OUTSIDE FILMS  - Any outside films related to the study being scheduled should be brought with you on the day of the exam.  If this cannot be done there may be a delay in the reading of the study. MEDICATIONS  - Patient must bring a complete list of all medications currently taking to include prescriptions, over-the-counter meds, herbals, vitamins & any dietary supplements Patient Instructions Increase vit b12 sublingual 
 
  
 Please provide this summary of care documentation to your next provider. Your primary care clinician is listed as BRENNA WEAVER. If you have any questions after today's visit, please call [phone].

## 2017-04-19 NOTE — PROGRESS NOTES
[facility]             [address] [place] [address] Dr [address]es, [address]    4/19/2017    HPI:  Ene Maldonado is a 74 y.o., right handed,   female, who presents with intermittent paresthesias as well as memory loss. She reports that suddenly started 6 months ago and her dorsal surface of the right arm lasting for minutes at a time without   several months later she began noting the same difficulty and the right lateral leg. Episodes occur independently are not associated with neck pain or headache. She does also admit to memory loss which started 3-4 years ago occasional depression she describes breast pain is 11 or 7/10 she notes the tingling can vary in intensity from 7-10 pain scale. She is aware of no history of strokes or head trauma. Patient returns  In follow up continues to note some difficulty at times with memory specially in trying to recall names or dates. She did not remember to get the lab work. Current Outpatient Prescriptions   Medication Sig Dispense Refill    insulin glargine (LANTUS SOLOSTAR) 100 unit/mL (3 mL) pen 45 units in the morning and 30 units at night 3 Each 3    insulin aspart (NOVOLOG) 100 unit/mL inpn 0.1 mL by SubCUTAneous route Before breakfast, lunch, and dinner. 5 Pen 6    SITagliptin (JANUVIA) 100 mg tablet Take 1 Tab by mouth daily. 90 Tab 3    aspirin delayed-release 81 mg tablet Take 1 Tab by mouth daily. 90 Tab 3    glucose blood VI test strips (BLOOD GLUCOSE TEST) strip Freestyle lite test strips. Test 2 times a day. Dx: E11.9 200 Strip 3    Blood-Glucose Meter monitoring kit Free style lite glucose meter. Test blood sugar 2 times a day. Dx: E11.65 1 Kit 0    Lancets misc Lancets for Free Style lite glucose meter. Test blood sugar 2 times a day.  Dx; E11.65 200 Each 3    Insulin Needles, Disposable, (BD INSULIN PEN NEEDLE UF MINI) 31 gauge x 3/16\" ndle Use with Lantus Insulin injections twice a day 200 Pen Needle 3    NIFEdipine ER (ADALAT CC) 30 mg ER tablet Take 2 Tabs by mouth daily. 180 Tab 3    rosuvastatin (CRESTOR) 5 mg tablet Take 1 Tab by mouth nightly. 90 Tab 3    OTHER Ketamine 10%/Gabapentin 6%/ Baclofen 2%/ Cyclobenzaprine 2%/ Lidocaine 2%/ Flurbiprofen 10%  Apply 1-2 Grams to the affected area 3-4 Times per day. QTY-180 GM  REFILLS-5      vit A & D3 in cod liver oil (COD LIVER OIL) 4,000 unit-400 unit/5 mL Liqd Take  by mouth. 1 tablespoon daily      metFORMIN ER (GLUCOPHAGE XR) 500 mg tablet Take 2 Tabs by mouth daily (with dinner). 180 Tab 3       Past Medical History:   Diagnosis Date    Anemia NEC     Chest pain, atypical     CKD (chronic kidney disease)     Constipation     Diabetes (HCC)     Ectopic pregnancy     History of Holter monitoring 10/14/2013    Baseline sinus rhythm w/extremely infrequent PVCs, which are likely cause of patient's symptoms.  Hyperlipidemia     Hypertension     Normal stress echo 09/24/2010    Normal study after maximal exercise. No symptoms. Ex time 6 min. EF 60%.          Past Surgical History:   Procedure Laterality Date    COLONOSCOPY N/A 9/15/2016    COLONOSCOPYwith polypectomies [facility] ENDOSCOPY    HX HYSTERECTOMY       Family History   Problem Relation Age of Onset    Heart Attack Mother     Other Mother      CAD    Diabetes Mother     Diabetes Sister     HIV/AIDS Brother     Hypertension Sister     Stroke Sister     Diabetes Sister     Seizures Sister     Heart Failure Brother      Allergies   Allergen Reactions    Allegra-D 12 Hour [Fexofenadine-Pseudoephedrine] Shortness of Breath    Zocor [Simvastatin] Other (comments)     Intolerance, muscle ache       Review of Systems:   Review of Systems - History obtained from the patient  General ROS: positive for  - fatigue  Psychological ROS: positive for - depression  ENT ROS: negative  Hematological and Lymphatic ROS: negative  Endocrine ROS: negative  Respiratory ROS: no cough, shortness of breath, or wheezing  Cardiovascular ROS: no chest pain or dyspnea on exertion  Gastrointestinal ROS: no abdominal pain, change in bowel habits, or black or bloody stools  Genito-Urinary ROS: no dysuria, trouble voiding, or hematuria  Musculoskeletal ROS: positive for - joint pain and pain in back - lower and neck - lower  Neurological ROS: positive for - memory loss and numbness/tingling  Dermatological ROS: negative    PHYSICAL EXAMINATION:    Visit Vitals    /72    Pulse 90    Temp 98.6 °F (37 °C) (Oral)    Ht 5' 5\" (1.651 m)    Wt 106.4 kg (234 lb 9.6 oz)    SpO2 98%    BMI 39.04 kg/m2     General:  Well defined, nourished, and groomed individual in no acute distress. Neck: Supple, nontender, thyroid within normal limits, no JVD, no bruits, no pain with resistance to active range of motion. Heart: Regular rate and rhythm, no murmurs, rub, or gallop. Normal S1S2    Psych:  Good mood and normal affect    NEUROLOGICAL EXAMINATION:     Mental Status:   Alert and oriented to person, place, and time with recent and remote memory intact. Attention span and concentration are normal. Speech is fluent with a full fund of knowledge. stm3/3    Cranial Nerves:    II, III, IV, VI:  Visual acuity grossly intact. Visual fields are normal.    Pupils are equal, round, and reactive to light and accommodation. Extra-ocular movements are full and fluid. no ptosis or nystagmus. V-XII: Hearing is grossly intact. Facial features are symmetric    Motor Examination: Normal tone, bulk, and strength, 5/5 muscle strength throughout. No cogwheel rigidity or clonus present. Coordination: No resting or intention tremor    Gait and Station:  Steady while walking     Normal arm swing. No muscle wasting or fasiculations noted.       Personally reviewed mri imaging as minimal small vessel disease,eeg normal    Assessment and Plan:   Shantel Sanders is a 74 y.o. right handed female whose history and physical are consistent with  Paresthesias right sided  And memory loss. Faye Joe who has risk factors including hypertension,diabetes,hyperlidpidemia    Amanda Gates was seen today for numbness and memory loss. Diagnoses and all orders for this visit:    Disturbance of skin sensation    Memory loss    Essential hypertension    Diabetes mellitus type 2 in obese [facility])      Follow-up Disposition:  Return in about 6 months (around 10/19/2017). Reviewed notes and lab in chart as well as work up accomplished . Discussed starting medication for memory loss patient deferred  Reviewed need for  Vit b12  Supplementation,control blood pressure and diabetes  I spent 30  minutes with the patient in face-to-face consultation, of which greater than 50% was spent in counseling and coordination of care as described above.

## 2017-04-20 NOTE — TELEPHONE ENCOUNTER
Pt calling back regarding getting an antibiotic for her finger. Her finger is still bleeding and she can't see orthopedic until 5/15/17. Syncronex.

## 2017-04-21 RX ORDER — CEPHALEXIN 500 MG/1
500 CAPSULE ORAL 2 TIMES DAILY
Qty: 14 CAP | Refills: 0 | Status: SHIPPED | OUTPATIENT
Start: 2017-04-21 | End: 2017-04-28

## 2017-05-15 ENCOUNTER — OFFICE VISIT (OUTPATIENT)
Dept: ORTHOPEDIC SURGERY | Facility: CLINIC | Age: 72
End: 2017-05-15

## 2017-05-15 VITALS
WEIGHT: 231.4 LBS | SYSTOLIC BLOOD PRESSURE: 169 MMHG | HEIGHT: 65 IN | TEMPERATURE: 97.6 F | BODY MASS INDEX: 38.55 KG/M2 | HEART RATE: 68 BPM | DIASTOLIC BLOOD PRESSURE: 78 MMHG

## 2017-05-15 DIAGNOSIS — R20.2 NUMBNESS AND TINGLING IN RIGHT HAND: ICD-10-CM

## 2017-05-15 DIAGNOSIS — N18.9 CKD (CHRONIC KIDNEY DISEASE), UNSPECIFIED STAGE: ICD-10-CM

## 2017-05-15 DIAGNOSIS — M79.641 RIGHT HAND PAIN: ICD-10-CM

## 2017-05-15 DIAGNOSIS — R20.0 NUMBNESS AND TINGLING IN RIGHT HAND: ICD-10-CM

## 2017-05-15 DIAGNOSIS — M67.441 DIGITAL MUCOUS CYST OF FINGER OF RIGHT HAND: Primary | ICD-10-CM

## 2017-05-15 NOTE — PROGRESS NOTES
Patient: Juan Mock                MRN: [id number]       SSN: [id number]  YOB: 1945        AGE: 74 y.o. SEX: female    PCP: Faheem Gil MD  05/15/17    Chief Complaint   Patient presents with    Hand Pain     right     HISTORY:  Juan Mock is a 74 y.o. female who is seen for right hand pain. She reports that she injured her right middle finger in August of 2016. She does not recall any specific event or trauma. She noted increased cystic swelling over her right middle finger DIP joint in March of 2017. She states that the cystic swelling burst and bled excessively several times. She has increased pain with bending her right middle finger. She reports some mild numbness and tingling of her right hand currently. Pain Assessment  5/15/2017   Location of Pain Hand   Location Modifiers Right   Severity of Pain 4   Quality of Pain Aching   Frequency of Pain Several times daily   Aggravating Factors Other (Comment)     Occupation, etc:  Ms. Hattie Burgess previously worked as a  for [employer] and also at [employer] before she retired at the age of 68. She is diabetic and hypertensive. She reports that she has lost 2 pounds recently. Current weight is 231 pounds. She is 5'5\" tall. She takes aspirin regularly. She recently completed her second master's degree in divinity. She lives with her  in the [place] [address].       Lab Results   Component Value Date/Time    Hemoglobin A1c 10.0 01/27/2017 03:36 PM    Hemoglobin A1c (POC) 9.2 04/17/2017 08:49 AM     Weight Metrics 5/15/2017 4/19/2017 4/17/2017 3/8/2017 2/7/2017 1/30/2017 1/16/2017   Weight 231 lb 6.4 oz 234 lb 9.6 oz 236 lb 8 oz 232 lb 4 oz 233 lb 233 lb 4 oz 231 lb   BMI 38.51 kg/m2 39.04 kg/m2 39.36 kg/m2 38.65 kg/m2 38.77 kg/m2 38.81 kg/m2 38.44 kg/m2     Patient Active Problem List   Diagnosis Code    Hyperlipidemia E78.5    Diabetes mellitus type 2 in obese ([facility]) E11.9, E66.9    Hypertension I10    Constipation K59.00    Abdominal pain, epigastric R10.13    Elevated CPK R74.8    Diabetes ([facility]) E11.9    CKD (chronic kidney disease) N18.9    Type II diabetes mellitus, uncontrolled ([facility]) E11.65    BMI 39.0-39.9,adult Z68.39     REVIEW OF SYSTEMS: All Below are Negative except: See HPI   Constitutional: negative for fever, chills, and weight loss. Cardiovascular: negative for chest pain, claudication, leg swelling, SOB, SANTOYO   Gastrointestinal: Negative for pain, N/V/C/D, Blood in stool or urine, dysuria,  hematuria, incontinence, pelvic pain. Musculoskeletal: See HPI   Neurological: Negative for dizziness and weakness. Negative for headaches, Visual changes, confusion, seizures   Phychiatric/Behavioral: Negative for depression, memory loss, substance  abuse. Extremities: Negative for hair changes, rash, or skin lesion changes. Hematologic: Negative for bleeding problems, bruising, pallor or swollen lymph  nodes   Peripheral Vascular: No calf pain, no circulation deficits. Social History     Social History    Marital status:      Spouse name: N/A    Number of children: N/A    Years of education: N/A     Occupational History    Not on file. Social History Main Topics    Smoking status: Former Smoker    Smokeless tobacco: Never Used    Alcohol use No    Drug use: No    Sexual activity: Not on file     Other Topics Concern    Not on file     Social History Narrative      Allergies   Allergen Reactions    Allegra-D 12 Hour [Fexofenadine-Pseudoephedrine] Shortness of Breath    Zocor [Simvastatin] Other (comments)     Intolerance, muscle ache      Current Outpatient Prescriptions   Medication Sig    insulin glargine (LANTUS SOLOSTAR) 100 unit/mL (3 mL) pen 45 units in the morning and 30 units at night    insulin aspart (NOVOLOG) 100 unit/mL inpn 0.1 mL by SubCUTAneous route Before breakfast, lunch, and dinner.     SITagliptin (JANUVIA) 100 mg tablet Take 1 Tab by mouth daily.  aspirin delayed-release 81 mg tablet Take 1 Tab by mouth daily.  glucose blood VI test strips (BLOOD GLUCOSE TEST) strip Freestyle lite test strips. Test 2 times a day. Dx: E11.9    Blood-Glucose Meter monitoring kit Free style lite glucose meter. Test blood sugar 2 times a day. Dx: E11.65    Lancets misc Lancets for Free Style lite glucose meter. Test blood sugar 2 times a day. Dx; E11.65    Insulin Needles, Disposable, (BD INSULIN PEN NEEDLE UF MINI) 31 gauge x 3/16\" ndle Use with Lantus Insulin injections twice a day    NIFEdipine ER (ADALAT CC) 30 mg ER tablet Take 2 Tabs by mouth daily.  rosuvastatin (CRESTOR) 5 mg tablet Take 1 Tab by mouth nightly.  metFORMIN ER (GLUCOPHAGE XR) 500 mg tablet Take 2 Tabs by mouth daily (with dinner).  OTHER Ketamine 10%/Gabapentin 6%/ Baclofen 2%/ Cyclobenzaprine 2%/ Lidocaine 2%/ Flurbiprofen 10%  Apply 1-2 Grams to the affected area 3-4 Times per day. QTY-180 GM  REFILLS-5    vit A & D3 in cod liver oil (COD LIVER OIL) 4,000 unit-400 unit/5 mL Liqd Take  by mouth. 1 tablespoon daily     No current facility-administered medications for this visit. PHYSICAL EXAMINATION:  Visit Vitals    /78    Pulse 68    Temp 97.6 °F (36.4 °C)    Ht 5' 5\" (1.651 m)    Wt 231 lb 6.4 oz (105 kg)    BMI 38.51 kg/m2      ORTHO EXAMINATION:  Examination Right Hand   Skin Small ulceration overlying mucous cyst at the middle finger dorsal DIP joint   Deformity -   Swelling -   Tenderness +, middle finger DIP joint   Finger flexion Full   Finger extension Full   Sensation Normal   Capillary refill Normal   Heberden's nodes -   Dupuytren's -     RADIOGRAPHS:  XR RIGHT HAND 5/15/17  IMPRESSION:  Three views - No fractures, no joint space narrowing, no.    IMPRESSION:      ICD-10-CM ICD-9-CM    1. Digital mucous cyst of finger of right hand M67.441 727.43     Middle finger DIP joint   2.  Right hand pain M79.641 729.5 AMB POC XRAY, HAND; 3+ VIEWS   3. Uncontrolled type 2 diabetes mellitus without complication, with long-term current use of insulin (HCC) E11.65 250.02     Z79.4 V58.67    4. CKD (chronic kidney disease), unspecified stage N18.9 585.9    5. BMI 39.0-39.9,adult Z68.39 V85.39    6. Numbness and tingling in right hand R20.0 782.0     R20.2       PLAN:  She will follow up as needed. She will follow up with her primary care physician for high blood pressure. We discussed possible right middle finger DIP mucous cyst excision in the future if pain continues. We discussed possible RUE EMG/NCV study in the future if numbness and tingling continues.       Scribed by [employer] ([address]) as dictated by Nell Mcduffie MD

## 2017-05-15 NOTE — MR AVS SNAPSHOT
Visit Information Date & Time Provider Department Dept. Phone Encounter #  
 5/15/2017  8:30 AM Shobha Elizabeth, [facility]  Your Appointments 5/30/2017  8:00 AM  
Follow Up with Leighann Saavedra MD  
[address] (--) Appt Note: milagro Manzano [address] [address]  
  
    
 10/20/2017  8:45 AM  
Follow Up with Isaias Gutierrez MD  
[facility]) Appt Note: 6mon f/u  
 [address] [facility] [address] [phone]  
  
   
 [facility] [id number] Upcoming Health Maintenance Date Due DTaP/Tdap/Td series (1 - Tdap) 1/21/1964 GLAUCOMA SCREENING Q2Y 11/25/2015 EYE EXAM RETINAL OR DILATED Q1 4/20/2016 INFLUENZA AGE 9 TO ADULT 8/1/2017 HEMOGLOBIN A1C Q6M 10/17/2017 LIPID PANEL Q1 1/27/2018 MICROALBUMIN Q1 1/28/2018 Pneumococcal 65+ Low/Medium Risk (2 of 2 - PPSV23) 3/8/2018 FOOT EXAM Q1 3/8/2018 MEDICARE YEARLY EXAM 3/9/2018 COLONOSCOPY 9/20/2019 Allergies as of 5/15/2017  Review Complete On: 5/15/2017 By: Letitia Teague. Mary Elias LPN Severity Noted Reaction Type Reactions Allegra-d 12 Hour [Fexofenadine-pseudoephedrine]  09/23/2011    Shortness of Breath Zocor [Simvastatin]  06/11/2010    Other (comments) Intolerance, muscle ache Current Immunizations  Reviewed on 3/8/2017 No immunizations on file. Not reviewed this visit You Were Diagnosed With   
  
 Codes Comments Digital mucous cyst of finger of right hand    -  Primary ICD-10-CM: M67.441 ICD-9-CM: 727.43 Middle finger DIP joint Right hand pain     ICD-10-CM: M79.641 ICD-9-CM: 729.5 Uncontrolled type 2 diabetes mellitus without complication, with long-term current use of insulin (HCC)     ICD-10-CM: E11.65, Z79.4 ICD-9-CM: 250.02, V58.67 CKD (chronic kidney disease), unspecified stage     ICD-10-CM: N18.9 ICD-9-CM: 585.9 BMI 39.0-39.9,adult     ICD-10-CM: B81.22 ICD-9-CM: V85.39 Vitals BP Pulse Temp Height(growth percentile) Weight(growth percentile) BMI  
 169/78 68 97.6 °F (36.4 °C) 5' 5\" (1.651 m) 231 lb 6.4 oz (105 kg) 38.51 kg/m2 OB Status Smoking Status Hysterectomy Former Smoker Vitals History BMI and BSA Data Body Mass Index Body Surface Area 38.51 kg/m 2 2.19 m 2 Preferred Pharmacy Pharmacy Name Phone [facility], Mary[address] Barrios [phone] [phone] Your Updated Medication List  
  
   
This list is accurate as of: 5/15/17  9:27 AM.  Always use your most recent med list.  
  
  
  
  
 aspirin delayed-release 81 mg tablet Take 1 Tab by mouth daily. Blood-Glucose Meter monitoring kit Free style lite glucose meter. Test blood sugar 2 times a day. Dx: E11.65 COD LIVER OIL 4,000 unit-400 unit/5 mL Liqd Generic drug:  vit A and D3 in cod liver oil Take  by mouth. 1 tablespoon daily  
  
 glucose blood VI test strips strip Commonly known as:  blood glucose test  
Freestyle lite test strips. Test 2 times a day. Dx: E11.9  
  
 insulin aspart 100 unit/mL Inpn Commonly known as:  NOVOLOG  
0.1 mL by SubCUTAneous route Before breakfast, lunch, and dinner. insulin glargine 100 unit/mL (3 mL) pen Commonly known as:  LANTUS SOLOSTAR  
45 units in the morning and 30 units at night Insulin Needles (Disposable) 31 gauge x 3/16\" Ndle Commonly known as:  BD INSULIN PEN NEEDLE UF MINI Use with Lantus Insulin injections twice a day Lancets Misc Lancets for Free Style lite glucose meter. Test blood sugar 2 times a day. Dx; E11.65  
  
 metFORMIN  mg tablet Commonly known as:  glucophage XR Take 2 Tabs by mouth daily (with dinner). NIFEdipine ER 30 mg ER tablet Commonly known as:  ADALAT CC Take 2 Tabs by mouth daily. OTHER Ketamine 10%/Gabapentin 6%/ Baclofen 2%/ Cyclobenzaprine 2%/ Lidocaine 2%/ Flurbiprofen 10% Apply 1-2 Grams to the affected area 3-4 Times per day. QTY-180 GM REFILLS-5  
  
 rosuvastatin 5 mg tablet Commonly known as:  CRESTOR Take 1 Tab by mouth nightly. SITagliptin 100 mg tablet Commonly known as:  Ok Bassam Take 1 Tab by mouth daily. We Performed the Following AMB POC XRAY, HAND; 3+ VIEWS [25423 CPT(R)] To-Do List   
 08/16/2017 9:30 AM  
  Appointment with [address] [place] [address] at [facility] ([phone]) OUTSIDE FILMS  - Any outside films related to the study being scheduled should be brought with you on the day of the exam.  If this cannot be done there may be a delay in the reading of the study. MEDICATIONS  - Patient must bring a complete list of all medications currently taking to include prescriptions, over-the-counter meds, herbals, vitamins & any dietary supplements Patient Instructions Ganglions: Care Instructions Your Care Instructions A ganglion is a small sac, or cyst, filled with a clear fluid that is like jelly. A ganglion may look like a bump on the hand or wrist. It also can appear on your feet, ankles, knees, or shoulders. It is not cancer. A ganglion can grow out of the protective area, or capsule, around a joint. It also can grow on a tendon sheath, which covers the ropelike tendons that connect muscle to bone. A ganglion may hurt or cause numbness if it presses on a nerve. Many ganglions do not need treatment, and they often go away on their own. But if a ganglion hurts, becomes larger, causes numbness, or limits your activity, your doctor may want to drain it with a needle and syringe or remove it with minor surgery. Follow-up care is a key part of your treatment and safety.  Be sure to make and go to all appointments, and call your doctor if you are having problems. It's also a good idea to know your test results and keep a list of the medicines you take. How can you care for yourself at home? · Wear a wrist or finger splint as directed by your doctor. It will keep your wrist or hand from moving and help reduce the fluid in the cyst. This may be all you need for the ganglion to shrink and go away. · Do not smash a ganglion with a book or other heavy object. You may break a bone or otherwise injure your wrist by trying this folk remedy, and the ganglion may return anyway. · Do not try to drain the fluid by poking the ganglion with a pin or any other sharp object. You could cause an infection. When should you call for help? Call your doctor now or seek immediate medical care if: 
· You have signs of infection, such as: 
¨ Increased pain, swelling, warmth, or redness. ¨ Red streaks leading from the cyst. 
¨ Pus draining from the cyst. 
¨ A fever. Watch closely for changes in your health, and be sure to contact your doctor if: 
· You have increasing pain. · Your ganglion is getting larger. · You still have pain or numbness from a ganglion. Where can you learn more? Go to http://afshan-nikki.info/. Enter H604 in the search box to learn more about \"Ganglions: Care Instructions. \" Current as of: May 23, 2016 Content Version: 11.2 © 6504-8609 [employer]. Care instructions adapted under license by [employer] (which disclaims liability or warranty for this information). If you have questions about a medical condition or this instruction, always ask your healthcare professional. [place] [address] any warranty or liability for your use of this information. Please provide this summary of care documentation to your next provider. Your primary care clinician is listed as BRENNA WEAVER.  If you have any questions after today's visit, please call [phone].

## 2017-05-15 NOTE — PATIENT INSTRUCTIONS
Ganglions: Care Instructions  Your Care Instructions    A ganglion is a small sac, or cyst, filled with a clear fluid that is like jelly. A ganglion may look like a bump on the hand or wrist. It also can appear on your feet, ankles, knees, or shoulders. It is not cancer. A ganglion can grow out of the protective area, or capsule, around a joint. It also can grow on a tendon sheath, which covers the ropelike tendons that connect muscle to bone. A ganglion may hurt or cause numbness if it presses on a nerve. Many ganglions do not need treatment, and they often go away on their own. But if a ganglion hurts, becomes larger, causes numbness, or limits your activity, your doctor may want to drain it with a needle and syringe or remove it with minor surgery. Follow-up care is a key part of your treatment and safety. Be sure to make and go to all appointments, and call your doctor if you are having problems. It's also a good idea to know your test results and keep a list of the medicines you take. How can you care for yourself at home? · Wear a wrist or finger splint as directed by your doctor. It will keep your wrist or hand from moving and help reduce the fluid in the cyst. This may be all you need for the ganglion to shrink and go away. · Do not smash a ganglion with a book or other heavy object. You may break a bone or otherwise injure your wrist by trying this folk remedy, and the ganglion may return anyway. · Do not try to drain the fluid by poking the ganglion with a pin or any other sharp object. You could cause an infection. When should you call for help? Call your doctor now or seek immediate medical care if:  · You have signs of infection, such as:  ¨ Increased pain, swelling, warmth, or redness. ¨ Red streaks leading from the cyst.  ¨ Pus draining from the cyst.  ¨ A fever. Watch closely for changes in your health, and be sure to contact your doctor if:  · You have increasing pain.   · Your ganglion is getting larger. · You still have pain or numbness from a ganglion. Where can you learn more? Go to http://afshan-nikki.info/. Enter J713 in the search box to learn more about \"Ganglions: Care Instructions. \"  Current as of: May 23, 2016  Content Version: 11.2  © 1520-3746 [employer]. Care instructions adapted under license by [employer] (which disclaims liability or warranty for this information). If you have questions about a medical condition or this instruction, always ask your healthcare professional. [place] [address] any warranty or liability for your use of this information.

## 2017-07-03 ENCOUNTER — OFFICE VISIT (OUTPATIENT)
Dept: FAMILY MEDICINE CLINIC | Age: 72
End: 2017-07-03

## 2017-07-03 VITALS
DIASTOLIC BLOOD PRESSURE: 67 MMHG | TEMPERATURE: 97.1 F | BODY MASS INDEX: 39.03 KG/M2 | WEIGHT: 234.25 LBS | HEART RATE: 89 BPM | HEIGHT: 65 IN | SYSTOLIC BLOOD PRESSURE: 122 MMHG | RESPIRATION RATE: 20 BRPM

## 2017-07-03 DIAGNOSIS — I10 ESSENTIAL HYPERTENSION: ICD-10-CM

## 2017-07-03 DIAGNOSIS — E78.5 HYPERLIPIDEMIA, UNSPECIFIED HYPERLIPIDEMIA TYPE: ICD-10-CM

## 2017-07-03 RX ORDER — NIFEDIPINE 30 MG/1
60 TABLET, FILM COATED, EXTENDED RELEASE ORAL DAILY
Qty: 180 TAB | Refills: 3 | Status: SHIPPED | OUTPATIENT
Start: 2017-07-03

## 2017-07-03 RX ORDER — PEN NEEDLE, DIABETIC 31 GX3/16"
NEEDLE, DISPOSABLE MISCELLANEOUS
Qty: 200 PEN NEEDLE | Refills: 3 | Status: SHIPPED | OUTPATIENT
Start: 2017-07-03

## 2017-07-03 RX ORDER — ROSUVASTATIN CALCIUM 5 MG/1
5 TABLET, COATED ORAL
Qty: 90 TAB | Refills: 3 | Status: SHIPPED | OUTPATIENT
Start: 2017-07-03

## 2017-07-03 NOTE — PROGRESS NOTES
Chief Complaint   Patient presents with    Diabetes    Hypertension    Cholesterol Problem     high chol    Other     fat necrosis of breast       Health Maintenance Due   Topic Date Due    DTaP/Tdap/Td series (1 - Tdap) 01/21/1964    GLAUCOMA SCREENING Q2Y  11/25/2015    EYE EXAM RETINAL OR DILATED Q1  04/20/2016       Health Maintenance reviewed     1. Have you been to the ER, urgent care clinic since your last visit? Hospitalized since your last visit? No    2. Have you seen or consulted any other health care providers outside of the [address] [place] [address] since your last visit? Include any pap smears or colon screening.  No

## 2017-07-03 NOTE — PROGRESS NOTES
HISTORY OF PRESENT ILLNESS  Uriah Goldberg is a 74 y.o. female. Chief Complaint   Patient presents with    Diabetes Chronic problem, uncontrolled Reports compliance with meds states bg are improved as she is taking meds and a little better with diet, no log of bg with her today but she states bg was 135 this am and at times in the 120s, no low bg reported, occ 200 with poor diet but states much better controlled    Hypertension chronic problem, stable Reports compliance with meds Asymptomatic, no headache or dizziness.  Cholesterol Problem chronic problem, stable was off crestor for a while lost bottle needs to restart     high chol    Other     fat necrosis of breast asymptomatic at present wiley us due 8/17       HPI  Past Medical History:   Diagnosis Date    Anemia NEC     BMI 39.0-39.9,adult 5/15/2017    Chest pain, atypical     CKD (chronic kidney disease)     Constipation     Diabetes ([facility] [address]     Ectopic pregnancy     History of Holter monitoring 10/14/2013    Baseline sinus rhythm w/extremely infrequent PVCs, which are likely cause of patient's symptoms.  Hyperlipidemia     Hypertension     Normal stress echo 09/24/2010    Normal study after maximal exercise. No symptoms. Ex time 6 min. EF 60%. Current Outpatient Prescriptions   Medication Sig Dispense Refill    rosuvastatin (CRESTOR) 5 mg tablet Take 1 Tab by mouth nightly. 90 Tab 3    NIFEdipine ER (ADALAT CC) 30 mg ER tablet Take 2 Tabs by mouth daily. 180 Tab 3    Insulin Needles, Disposable, (BD INSULIN PEN NEEDLE UF MINI) 31 gauge x 3/16\" ndle Use with Lantus Insulin injections twice a day 200 Pen Needle 3    insulin glargine (LANTUS SOLOSTAR) 100 unit/mL (3 mL) pen 45 units in the morning and 30 units at night 3 Each 3    insulin aspart (NOVOLOG) 100 unit/mL inpn 0.1 mL by SubCUTAneous route Before breakfast, lunch, and dinner. 5 Pen 6    SITagliptin (JANUVIA) 100 mg tablet Take 1 Tab by mouth daily.  90 Tab 3    aspirin delayed-release 81 mg tablet Take 1 Tab by mouth daily. 90 Tab 3    glucose blood VI test strips (BLOOD GLUCOSE TEST) strip Freestyle lite test strips. Test 2 times a day. Dx: E11.9 200 Strip 3    Blood-Glucose Meter monitoring kit Free style lite glucose meter. Test blood sugar 2 times a day. Dx: E11.65 1 Kit 0    Lancets misc Lancets for Free Style lite glucose meter. Test blood sugar 2 times a day. Dx; E11.65 200 Each 3    OTHER Ketamine 10%/Gabapentin 6%/ Baclofen 2%/ Cyclobenzaprine 2%/ Lidocaine 2%/ Flurbiprofen 10%  Apply 1-2 Grams to the affected area 3-4 Times per day. QTY-180 GM  REFILLS-5      vit A & D3 in cod liver oil (COD LIVER OIL) 4,000 unit-400 unit/5 mL Liqd Take  by mouth. 1 tablespoon daily       Allergies   Allergen Reactions    Allegra-D 12 Hour [Fexofenadine-Pseudoephedrine] Shortness of Breath    Zocor [Simvastatin] Other (comments)     Intolerance, muscle ache       ROS Respiratory: Negative for shortness of breath. Cardiovascular: Negative for chest pain. Genitourinary: Negative for frequency. Neurological: Negative for dizziness and headaches. Visit Vitals    /67 (BP 1 Location: Left arm, BP Patient Position: Sitting)    Pulse 89    Temp 97.1 °F (36.2 °C) (Oral)    Resp 20    Ht 5' 5\" (1.651 m)    Wt 234 lb 4 oz (106.3 kg)    BMI 38.98 kg/m2       Physical Exam Nursing note and vitals reviewed. Constitutional: She is oriented to person, place, and time. She appears well-developed and well-nourished. No distress. HENT:   Mouth/Throat: Oropharynx is clear and moist.   Neck: No JVD present. No thyromegaly present. Cardiovascular: Normal rate, regular rhythm and normal heart sounds. Pulmonary/Chest: Effort normal and breath sounds normal. No respiratory distress. She has no wheezes. She has no rales. Musculoskeletal: She exhibits tr-1+ chronic edema. Lymphadenopathy:     She has no cervical adenopathy.    Neurological: She is alert and oriented to person, place, and time. Coordination normal.   Psychiatric: She has a normal mood and affect. Her behavior is normal.      ASSESSMENT and PLAN    ICD-10-CM ICD-9-CM    1. Uncontrolled type 2 diabetes mellitus without complication, with long-term current use of insulin ([facility] [address] encourage compliance with med and diet Check HbA1c in office on follow up  E11.65 250.02 Insulin Needles, Disposable, (BD INSULIN PEN NEEDLE UF MINI) 31 gauge x 3/16\" ndle    Z79.4 V58.67    2. Hyperlipidemia, unspecified hyperlipidemia type stable continue current medications  E78.5 272.4 rosuvastatin (CRESTOR) 5 mg tablet   3. Essential hypertension stable continue current medications  I10 401.9 NIFEdipine ER (ADALAT CC) 30 mg ER tablet   Follow-up Disposition:  Return in about 1 month (around 8/3/2017).  Check HbA1c in office on follow up

## 2017-07-03 NOTE — MR AVS SNAPSHOT
Visit Information Date & Time Provider Department Dept. Phone Encounter #  
 7/3/2017  8:15 AM Amy Martins MD [address] [phone] Follow-up Instructions Return in about 1 month (around 8/3/2017). Your Appointments 10/20/2017  8:45 AM  
Follow Up with Ivy Miles MD  
[address] [facility]) Appt Note: 6mon f/u  
 [address] [facility] [address] [phone]  
  
   
 [facility] [id number] Upcoming Health Maintenance Date Due DTaP/Tdap/Td series (1 - Tdap) 1/21/1964 GLAUCOMA SCREENING Q2Y 11/25/2015 EYE EXAM RETINAL OR DILATED Q1 4/20/2016 INFLUENZA AGE 9 TO ADULT 8/1/2017 HEMOGLOBIN A1C Q6M 10/17/2017 LIPID PANEL Q1 1/27/2018 MICROALBUMIN Q1 1/28/2018 Pneumococcal 65+ Low/Medium Risk (2 of 2 - PPSV23) 3/8/2018 FOOT EXAM Q1 3/8/2018 MEDICARE YEARLY EXAM 3/9/2018 COLONOSCOPY 9/20/2019 Allergies as of 7/3/2017  Review Complete On: 5/15/2017 By: Elisabet Benton MD  
  
 Severity Noted Reaction Type Reactions Allegra-d 12 Hour [Fexofenadine-pseudoephedrine]  09/23/2011    Shortness of Breath Zocor [Simvastatin]  06/11/2010    Other (comments) Intolerance, muscle ache Current Immunizations  Reviewed on 3/8/2017 No immunizations on file. Not reviewed this visit You Were Diagnosed With   
  
 Codes Comments Uncontrolled type 2 diabetes mellitus without complication, with long-term current use of insulin ([address]    -  Primary ICD-10-CM: E11.65, Z79.4 ICD-9-CM: 250.02, V58.67 Hyperlipidemia, unspecified hyperlipidemia type     ICD-10-CM: E78.5 ICD-9-CM: 272.4 Essential hypertension     ICD-10-CM: I10 
ICD-9-CM: 401.9 Vitals BP Pulse Temp Resp Height(growth percentile) Weight(growth percentile)  122/67 (BP 1 Location: Left arm, BP Patient Position: Sitting) 89 97.1 °F (36.2 °C) (Oral) 20 5' 5\" (1.651 m) 234 lb 4 oz (106.3 kg) BMI OB Status Smoking Status 38.98 kg/m2 Hysterectomy Former Smoker BMI and BSA Data Body Mass Index Body Surface Area 38.98 kg/m 2 2.21 m 2 Preferred Pharmacy Pharmacy Name Phone [facility] [phone] [phone] Your Updated Medication List  
  
   
This list is accurate as of: 7/3/17  9:19 AM.  Always use your most recent med list.  
  
  
  
  
 aspirin delayed-release 81 mg tablet Take 1 Tab by mouth daily. Blood-Glucose Meter monitoring kit Free style lite glucose meter. Test blood sugar 2 times a day. Dx: E11.65 COD LIVER OIL 4,000 unit-400 unit/5 mL Liqd Generic drug:  vit A and D3 in cod liver oil Take  by mouth. 1 tablespoon daily  
  
 glucose blood VI test strips strip Commonly known as:  blood glucose test  
Freestyle lite test strips. Test 2 times a day. Dx: E11.9  
  
 insulin aspart 100 unit/mL Inpn Commonly known as:  NOVOLOG  
0.1 mL by SubCUTAneous route Before breakfast, lunch, and dinner. insulin glargine 100 unit/mL (3 mL) Inpn Commonly known as:  LANTUS SOLOSTAR  
45 units in the morning and 30 units at night Insulin Needles (Disposable) 31 gauge x 3/16\" Ndle Commonly known as:  BD INSULIN PEN NEEDLE UF MINI Use with Lantus Insulin injections twice a day Lancets Misc Lancets for Free Style lite glucose meter. Test blood sugar 2 times a day. Dx; E11.65  
  
 NIFEdipine ER 30 mg ER tablet Commonly known as:  ADALAT CC Take 2 Tabs by mouth daily. OTHER Ketamine 10%/Gabapentin 6%/ Baclofen 2%/ Cyclobenzaprine 2%/ Lidocaine 2%/ Flurbiprofen 10% Apply 1-2 Grams to the affected area 3-4 Times per day. QTY-180 GM REFILLS-5  
  
 rosuvastatin 5 mg tablet Commonly known as:  CRESTOR Take 1 Tab by mouth nightly. SITagliptin 100 mg tablet Commonly known as:  Sung Anger Take 1 Tab by mouth daily. Prescriptions Sent to Pharmacy Refills  
 rosuvastatin (CRESTOR) 5 mg tablet 3 Sig: Take 1 Tab by mouth nightly. Class: Normal  
 Pharmacy: [address] Ph #: [phone] Route: Oral  
 NIFEdipine ER (ADALAT CC) 30 mg ER tablet 3 Sig: Take 2 Tabs by mouth daily. Class: Normal  
 Pharmacy: [address] Ph #: [phone] Route: Oral  
 Insulin Needles, Disposable, (BD INSULIN PEN NEEDLE UF MINI) 31 gauge x 3/16\" ndle 3 Sig: Use with Lantus Insulin injections twice a day Class: Normal  
 Pharmacy: [address] Ph #: [phone] Follow-up Instructions Return in about 1 month (around 8/3/2017). To-Do List   
 08/16/2017 9:30 AM  
  Appointment with [address] [place] [address] at [facility] ([phone]) OUTSIDE FILMS  - Any outside films related to the study being scheduled should be brought with you on the day of the exam.  If this cannot be done there may be a delay in the reading of the study. MEDICATIONS  - Patient must bring a complete list of all medications currently taking to include prescriptions, over-the-counter meds, herbals, vitamins & any dietary supplements Patient Instructions Please contact our office if you have any questions about your visit today. Please provide this summary of care documentation to your next provider. Your primary care clinician is listed as BRENNA WEAVER. If you have any questions after today's visit, please call [phone].

## 2017-08-16 ENCOUNTER — HOSPITAL ENCOUNTER (OUTPATIENT)
Dept: ULTRASOUND IMAGING | Age: 72
Discharge: HOME | End: 2017-08-16
Attending: FAMILY MEDICINE
Payer: MEDICARE

## 2017-08-16 DIAGNOSIS — N63.0 BREAST MASS: ICD-10-CM

## 2017-08-16 PROCEDURE — 76642 ULTRASOUND BREAST LIMITED: CPT

## 2017-08-17 ENCOUNTER — HOSPITAL ENCOUNTER (OUTPATIENT)
Dept: LAB | Age: 72
Discharge: HOME | End: 2017-08-17
Payer: MEDICARE

## 2017-08-17 ENCOUNTER — OFFICE VISIT (OUTPATIENT)
Dept: FAMILY MEDICINE CLINIC | Age: 72
End: 2017-08-17

## 2017-08-17 VITALS
WEIGHT: 236.5 LBS | BODY MASS INDEX: 39.4 KG/M2 | SYSTOLIC BLOOD PRESSURE: 118 MMHG | TEMPERATURE: 97.5 F | DIASTOLIC BLOOD PRESSURE: 69 MMHG | RESPIRATION RATE: 20 BRPM | HEART RATE: 90 BPM | HEIGHT: 65 IN

## 2017-08-17 DIAGNOSIS — R60.0 EDEMA OF EXTREMITIES: ICD-10-CM

## 2017-08-17 DIAGNOSIS — R06.02 SOBOE (SHORTNESS OF BREATH ON EXERTION): ICD-10-CM

## 2017-08-17 DIAGNOSIS — L98.9 LESION OF FINGER: ICD-10-CM

## 2017-08-17 LAB
D DIMER PPP FEU-MCNC: 1.07 UG/ML(FEU)
GLUCOSE POC: 175 MG/DL
HBA1C MFR BLD HPLC: 9 %

## 2017-08-17 PROCEDURE — 85379 FIBRIN DEGRADATION QUANT: CPT | Performed by: FAMILY MEDICINE

## 2017-08-17 PROCEDURE — 36415 COLL VENOUS BLD VENIPUNCTURE: CPT | Performed by: FAMILY MEDICINE

## 2017-08-17 NOTE — PROGRESS NOTES
Chief Complaint   Patient presents with    Diabetes    Cholesterol Problem     high chol    Hypertension       Health Maintenance Due   Topic Date Due    DTaP/Tdap/Td series (1 - Tdap) 01/21/1964    GLAUCOMA SCREENING Q2Y  11/25/2015    EYE EXAM RETINAL OR DILATED Q1  04/20/2016    INFLUENZA AGE 9 TO ADULT  08/01/2017       Health Maintenance reviewed     1. Have you been to the ER, urgent care clinic since your last visit? Hospitalized since your last visit? No    2. Have you seen or consulted any other health care providers outside of the [address] [place] [address] since your last visit? Include any pap smears or colon screening.  Yes When: 8/17 Where: oncology Reason for visit: ov

## 2017-08-17 NOTE — MR AVS SNAPSHOT
Visit Information Date & Time Provider Department Dept. Phone Encounter #  
 8/17/2017  7:45 AM MD Margar[address] Le[address] [phone] Follow-up Instructions Return in about 6 weeks (around 9/28/2017). Your Appointments 10/20/2017  8:45 AM  
Follow Up with Mayur Santiago MD  
[employer] [facility]) Appt Note: 6mon f/u  
 [address] [place] [address] [place] [address] [phone]  
  
   
 [place] [id number] Upcoming Health Maintenance Date Due DTaP/Tdap/Td series (1 - Tdap) 1/21/1964 GLAUCOMA SCREENING Q2Y 11/25/2015 INFLUENZA AGE 9 TO ADULT 8/1/2017 EYE EXAM RETINAL OR DILATED Q1 11/17/2017* HEMOGLOBIN A1C Q6M 10/17/2017 LIPID PANEL Q1 1/27/2018 MICROALBUMIN Q1 1/28/2018 Pneumococcal 65+ Low/Medium Risk (2 of 2 - PPSV23) 3/8/2018 FOOT EXAM Q1 3/8/2018 MEDICARE YEARLY EXAM 3/9/2018 COLONOSCOPY 9/20/2019 *Topic was postponed. The date shown is not the original due date. Allergies as of 8/17/2017  Review Complete On: 8/17/2017 By: Lorraine Grande MD  
  
 Severity Noted Reaction Type Reactions Allegra-d 12 Hour [Fexofenadine-pseudoephedrine]  09/23/2011    Shortness of Breath Zocor [Simvastatin]  06/11/2010    Other (comments) Intolerance, muscle ache Current Immunizations  Reviewed on 3/8/2017 No immunizations on file. Not reviewed this visit You Were Diagnosed With   
  
 Codes Comments Uncontrolled type 2 diabetes mellitus without complication, with long-term current use of insulin ([facility] [address]    -  Primary ICD-10-CM: E11.65, Z79.4 ICD-9-CM: 250.02, V58.67 Edema of extremities     ICD-10-CM: R60.0 ICD-9-CM: 782.3 SOBOE (shortness of breath on exertion)     ICD-10-CM: R06.02 
ICD-9-CM: 786.05 Lesion of finger     ICD-10-CM: L98.9 ICD-9-CM: 709.9 Vitals BP Pulse Temp Resp Height(growth percentile) Weight(growth percentile)  
 118/69 (BP 1 Location: Left arm, BP Patient Position: Sitting) 90 97.5 °F (36.4 °C) (Oral) 20 5' 5\" (1.651 m) 236 lb 8 oz (107.3 kg) BMI OB Status Smoking Status 39.36 kg/m2 Hysterectomy Former Smoker BMI and BSA Data Body Mass Index Body Surface Area  
 39.36 kg/m 2 2.22 m 2 Preferred Pharmacy Pharmacy Name Phone [facility] [phone] [phone] Your Updated Medication List  
  
   
This list is accurate as of: 8/17/17  8:49 AM.  Always use your most recent med list.  
  
  
  
  
 aspirin delayed-release 81 mg tablet Take 1 Tab by mouth daily. Blood-Glucose Meter monitoring kit Free style lite glucose meter. Test blood sugar 2 times a day. Dx: E11.65 COD LIVER OIL 4,000 unit-400 unit/5 mL Liqd Generic drug:  vit A and D3 in cod liver oil Take  by mouth. 1 tablespoon daily  
  
 glucose blood VI test strips strip Commonly known as:  blood glucose test  
Freestyle lite test strips. Test 2 times a day. Dx: E11.9  
  
 insulin aspart 100 unit/mL Inpn Commonly known as:  NOVOLOG  
0.1 mL by SubCUTAneous route Before breakfast, lunch, and dinner. insulin glargine 100 unit/mL (3 mL) Inpn Commonly known as:  LANTUS SOLOSTAR  
45 units in the morning and 30 units at night Insulin Needles (Disposable) 31 gauge x 3/16\" Ndle Commonly known as:  BD INSULIN PEN NEEDLE UF MINI Use with Lantus Insulin injections twice a day Lancets Misc Lancets for Free Style lite glucose meter. Test blood sugar 2 times a day. Dx; E11.65  
  
 NIFEdipine ER 30 mg ER tablet Commonly known as:  ADALAT CC Take 2 Tabs by mouth daily. OTHER Ketamine 10%/Gabapentin 6%/ Baclofen 2%/ Cyclobenzaprine 2%/ Lidocaine 2%/ Flurbiprofen 10% Apply 1-2 Grams to the affected area 3-4 Times per day.  QTY-180 GM REFILLS-5  
  
 rosuvastatin 5 mg tablet Commonly known as:  CRESTOR Take 1 Tab by mouth nightly. SITagliptin 100 mg tablet Commonly known as:  Deysi Melena Take 1 Tab by mouth daily. We Performed the Following AMB POC GLUCOSE, QUANTITATIVE, BLOOD [24422 CPT(R)] AMB POC HEMOGLOBIN A1C [20837 CPT(R)] REFERRAL TO DERMATOLOGY [REF19 Custom] Comments:  
 Please evaluate patient for . Follow-up Instructions Return in about 6 weeks (around 9/28/2017). To-Do List   
 08/17/2017 ECHO:  2D ECHO COMPLETE ADULT (TTE) W OR WO CONTR Referral Information Referral ID Referred By Referred To  
  
 [id number] Antoine WEAVER [facility] [place] [id number] Phone: [phone] Fax: [phone] Visits Status Start Date End Date 1 New Request 8/17/17 8/17/18 If your referral has a status of pending review or denied, additional information will be sent to support the outcome of this decision. Patient Instructions Please contact our office if you have any questions about your visit today. Please provide this summary of care documentation to your next provider. Your primary care clinician is listed as BRENNA WEAVER. If you have any questions after today's visit, please call [phone].
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
## 2017-08-29 ENCOUNTER — HOSPITAL ENCOUNTER (OUTPATIENT)
Dept: NON INVASIVE DIAGNOSTICS | Age: 72
Discharge: HOME | End: 2017-08-29
Attending: FAMILY MEDICINE
Payer: MEDICARE

## 2017-08-29 DIAGNOSIS — R60.0 EDEMA OF EXTREMITIES: ICD-10-CM

## 2017-08-29 DIAGNOSIS — R06.02 SOBOE (SHORTNESS OF BREATH ON EXERTION): ICD-10-CM

## 2017-08-29 PROCEDURE — 93306 TTE W/DOPPLER COMPLETE: CPT

## 2017-10-04 ENCOUNTER — HOSPITAL ENCOUNTER (OUTPATIENT)
Dept: CT IMAGING | Age: 72
Discharge: HOME | End: 2017-10-04
Attending: FAMILY MEDICINE
Payer: MEDICARE

## 2017-10-04 ENCOUNTER — OFFICE VISIT (OUTPATIENT)
Dept: FAMILY MEDICINE CLINIC | Age: 72
End: 2017-10-04

## 2017-10-04 ENCOUNTER — HOSPITAL ENCOUNTER (OUTPATIENT)
Dept: LAB | Age: 72
Discharge: HOME | End: 2017-10-04
Payer: MEDICARE

## 2017-10-04 VITALS
BODY MASS INDEX: 39.32 KG/M2 | DIASTOLIC BLOOD PRESSURE: 61 MMHG | RESPIRATION RATE: 20 BRPM | HEIGHT: 65 IN | HEART RATE: 87 BPM | SYSTOLIC BLOOD PRESSURE: 113 MMHG | TEMPERATURE: 97.1 F | WEIGHT: 236 LBS

## 2017-10-04 DIAGNOSIS — R79.89 POSITIVE D DIMER: ICD-10-CM

## 2017-10-04 DIAGNOSIS — R35.0 URINARY FREQUENCY: ICD-10-CM

## 2017-10-04 DIAGNOSIS — I50.31 ACUTE DIASTOLIC CHF (CONGESTIVE HEART FAILURE) (HCC): ICD-10-CM

## 2017-10-04 DIAGNOSIS — R07.89 INTERMITTENT LEFT-SIDED CHEST PAIN: ICD-10-CM

## 2017-10-04 DIAGNOSIS — R06.02 SOBOE (SHORTNESS OF BREATH ON EXERTION): ICD-10-CM

## 2017-10-04 DIAGNOSIS — I51.9 MILD DIASTOLIC DYSFUNCTION: ICD-10-CM

## 2017-10-04 DIAGNOSIS — I51.7 LVH (LEFT VENTRICULAR HYPERTROPHY): ICD-10-CM

## 2017-10-04 DIAGNOSIS — R07.89 INTERMITTENT LEFT-SIDED CHEST PAIN: Primary | ICD-10-CM

## 2017-10-04 LAB
ALBUMIN SERPL-MCNC: 3.8 G/DL (ref 3.4–5)
ALBUMIN/GLOB SERPL: 0.9 {RATIO} (ref 0.8–1.7)
ALP SERPL-CCNC: 105 U/L (ref 45–117)
ALT SERPL-CCNC: 30 U/L (ref 13–56)
ANION GAP SERPL CALC-SCNC: 9 MMOL/L (ref 3–18)
AST SERPL-CCNC: 19 U/L (ref 15–37)
BASOPHILS # BLD: 0 K/UL (ref 0–0.06)
BASOPHILS NFR BLD: 0 % (ref 0–2)
BILIRUB SERPL-MCNC: 0.5 MG/DL (ref 0.2–1)
BUN SERPL-MCNC: 15 MG/DL (ref 7–18)
BUN/CREAT SERPL: 13 (ref 12–20)
CALCIUM SERPL-MCNC: 9.3 MG/DL (ref 8.5–10.1)
CHLORIDE SERPL-SCNC: 101 MMOL/L (ref 100–108)
CO2 SERPL-SCNC: 29 MMOL/L (ref 21–32)
CREAT SERPL-MCNC: 1.19 MG/DL (ref 0.6–1.3)
CREAT UR-MCNC: 1.3 MG/DL (ref 0.6–1.3)
D DIMER PPP FEU-MCNC: 1.31 UG/ML(FEU)
DIFFERENTIAL METHOD BLD: ABNORMAL
EOSINOPHIL # BLD: 0.2 K/UL (ref 0–0.4)
EOSINOPHIL NFR BLD: 3 % (ref 0–5)
ERYTHROCYTE [DISTWIDTH] IN BLOOD BY AUTOMATED COUNT: 14.9 % (ref 11.6–14.5)
GLOBULIN SER CALC-MCNC: 4.3 G/DL (ref 2–4)
GLUCOSE SERPL-MCNC: 170 MG/DL (ref 74–99)
HCT VFR BLD AUTO: 38.4 % (ref 35–45)
HGB BLD-MCNC: 11.8 G/DL (ref 12–16)
LYMPHOCYTES # BLD: 2.1 K/UL (ref 0.9–3.6)
LYMPHOCYTES NFR BLD: 31 % (ref 21–52)
MCH RBC QN AUTO: 22.2 PG (ref 24–34)
MCHC RBC AUTO-ENTMCNC: 30.7 G/DL (ref 31–37)
MCV RBC AUTO: 72.3 FL (ref 74–97)
MONOCYTES # BLD: 0.7 K/UL (ref 0.05–1.2)
MONOCYTES NFR BLD: 10 % (ref 3–10)
NEUTS SEG # BLD: 3.8 K/UL (ref 1.8–8)
NEUTS SEG NFR BLD: 56 % (ref 40–73)
PLATELET # BLD AUTO: 317 K/UL (ref 135–420)
PMV BLD AUTO: 10.2 FL (ref 9.2–11.8)
POTASSIUM SERPL-SCNC: 4.2 MMOL/L (ref 3.5–5.5)
PROT SERPL-MCNC: 8.1 G/DL (ref 6.4–8.2)
RBC # BLD AUTO: 5.31 M/UL (ref 4.2–5.3)
SODIUM SERPL-SCNC: 139 MMOL/L (ref 136–145)
TSH SERPL DL<=0.05 MIU/L-ACNC: 1.71 UIU/ML (ref 0.36–3.74)
WBC # BLD AUTO: 6.8 K/UL (ref 4.6–13.2)

## 2017-10-04 PROCEDURE — 85379 FIBRIN DEGRADATION QUANT: CPT | Performed by: FAMILY MEDICINE

## 2017-10-04 PROCEDURE — 71275 CT ANGIOGRAPHY CHEST: CPT

## 2017-10-04 PROCEDURE — 36415 COLL VENOUS BLD VENIPUNCTURE: CPT | Performed by: FAMILY MEDICINE

## 2017-10-04 PROCEDURE — 74011636320 HC RX REV CODE- 636/320: Performed by: FAMILY MEDICINE

## 2017-10-04 PROCEDURE — 82565 ASSAY OF CREATININE: CPT

## 2017-10-04 PROCEDURE — 80053 COMPREHEN METABOLIC PANEL: CPT | Performed by: FAMILY MEDICINE

## 2017-10-04 PROCEDURE — 85025 COMPLETE CBC W/AUTO DIFF WBC: CPT | Performed by: FAMILY MEDICINE

## 2017-10-04 PROCEDURE — 84443 ASSAY THYROID STIM HORMONE: CPT | Performed by: FAMILY MEDICINE

## 2017-10-04 RX ADMIN — IOPAMIDOL 80 ML: 755 INJECTION, SOLUTION INTRAVENOUS at 19:00

## 2017-10-04 NOTE — PROGRESS NOTES
Chief Complaint   Patient presents with    Diabetes    Shortness of Breath    Other     edema, extremities    Results     discuss lab and echo results       Health Maintenance Due   Topic Date Due    DTaP/Tdap/Td series (1 - Tdap) 01/21/1964    GLAUCOMA SCREENING Q2Y  11/25/2015    INFLUENZA AGE 9 TO ADULT  08/01/2017       Health Maintenance reviewed     1. Have you been to the ER, urgent care clinic since your last visit? Hospitalized since your last visit? No    2. Have you seen or consulted any other health care providers outside of the [address] [place] [address] since your last visit? Include any pap smears or colon screening.  No

## 2017-10-04 NOTE — PROGRESS NOTES
HISTORY OF PRESENT ILLNESS  Ruby Manning is a 74 y.o. female. Chief Complaint   Patient presents with    Diabetes    Shortness of Breath    Other     edema, extremities    Results     discuss lab and echo results   Patient complains of ongoing shortness of breath on exertion from last visit. She states that she has now developed chest pain intermittently for the past several weeks but never complained about it or talk about it. She waited for her appointment today. She does not have active chest pain today. The pain is on the left side of the chest and is not particularly associated with the shortness of breath on exertion but is off and on. She denies any associated radiation numbness tingling nausea or vomiting. She has continued swelling in the  Ankles without any leg pain. She continues to feel shortness of breath with exertion as well. She also has other complaints today. She is complaining of urinary frequency but is unable to go to the bathroom for urine. This is not a new problem has been ongoing for year or more      HPI  Past Medical History:   Diagnosis Date    Anemia NEC     BMI 39.0-39.9,adult 5/15/2017    Chest pain, atypical     CKD (chronic kidney disease)     Constipation     Diabetes ([facility] [address]     Ectopic pregnancy     History of Holter monitoring 10/14/2013    Baseline sinus rhythm w/extremely infrequent PVCs, which are likely cause of patient's symptoms.  Hyperlipidemia     Hypertension     Normal stress echo 09/24/2010    Normal study after maximal exercise. No symptoms. Ex time 6 min. EF 60%. Current Outpatient Prescriptions   Medication Sig Dispense Refill    rosuvastatin (CRESTOR) 5 mg tablet Take 1 Tab by mouth nightly. 90 Tab 3    NIFEdipine ER (ADALAT CC) 30 mg ER tablet Take 2 Tabs by mouth daily.  180 Tab 3    Insulin Needles, Disposable, (BD INSULIN PEN NEEDLE UF MINI) 31 gauge x 3/16\" ndle Use with Lantus Insulin injections twice a day 200 Pen Needle 3    insulin glargine (LANTUS SOLOSTAR) 100 unit/mL (3 mL) pen 45 units in the morning and 30 units at night (Patient taking differently: 50 units in the morning and 35 units at night) 3 Each 3    insulin aspart (NOVOLOG) 100 unit/mL inpn 0.1 mL by SubCUTAneous route Before breakfast, lunch, and dinner. 5 Pen 6    SITagliptin (JANUVIA) 100 mg tablet Take 1 Tab by mouth daily. 90 Tab 3    aspirin delayed-release 81 mg tablet Take 1 Tab by mouth daily. 90 Tab 3    glucose blood VI test strips (BLOOD GLUCOSE TEST) strip Freestyle lite test strips. Test 2 times a day. Dx: E11.9 200 Strip 3    Blood-Glucose Meter monitoring kit Free style lite glucose meter. Test blood sugar 2 times a day. Dx: E11.65 1 Kit 0    Lancets misc Lancets for Free Style lite glucose meter. Test blood sugar 2 times a day. Dx; E11.65 200 Each 3    OTHER Ketamine 10%/Gabapentin 6%/ Baclofen 2%/ Cyclobenzaprine 2%/ Lidocaine 2%/ Flurbiprofen 10%  Apply 1-2 Grams to the affected area 3-4 Times per day. QTY-180 GM  REFILLS-5      vit A & D3 in cod liver oil (COD LIVER OIL) 4,000 unit-400 unit/5 mL Liqd Take  by mouth. 1 tablespoon daily       Allergies   Allergen Reactions    Allegra-D 12 Hour [Fexofenadine-Pseudoephedrine] Shortness of Breath    Zocor [Simvastatin] Other (comments)     Intolerance, muscle ache       ROS  Visit Vitals    /61 (BP 1 Location: Right arm, BP Patient Position: Sitting)    Pulse 87    Temp 97.1 °F (36.2 °C) (Oral)    Resp 20    Ht 5' 5\" (1.651 m)    Wt 236 lb (107 kg)    BMI 39.27 kg/m2       Physical Exam   Nursing note and vitals reviewed. Constitutional: She is oriented to person, place, and time. She appears well-developed and well-nourished. No distress. HENT:   Mouth/Throat: Oropharynx is clear and moist.   Neck: No JVD present. No thyromegaly present. Cardiovascular: Normal rate, regular rhythm and normal heart sounds.     Pulmonary/Chest: Effort normal and breath sounds normal. No respiratory distress. She has no wheezes. She has no rales. Musculoskeletal: She exhibits tr-1+ nontender edema. Negative Homans sign  Lymphadenopathy:     She has no cervical adenopathy. Neurological: She is alert and oriented to person, place, and time. Coordination normal.   Psychiatric: She has a normal mood and affect. Her behavior is normal.     [address] [place] [address]   [place], [address]  [phone]    Transthoracic Echocardiogram    Patient: Ras Wright  MRN: [id number]  [address]  #: [[id number]]  : 1945  Age: 74 years  Gender: Female  Height: 65 in  Weight: 233.4 lb  BSA: 2.11 m-sq  BP: 132 / 67 mmHg  Study date: 29-Aug-2017  Status: Routine  Location: [facility] DMS ACC #: [id number]    Allergies: FEXOFENADINE-PSEUDOEPHEDRINE, SIMVASTATIN    Referring_Ordering Physician: Haidee Bence. Netta Menjivar MD  Interpreting Group: [address] [place] [address]  Interpreting Physician: Elisabeth Humphreys MD  Technologist: Lala Gal[place]    SUMMARY:  Left ventricle: Systolic function was normal. Ejection fraction was estimated   in the range of 55 % to 60 %. There were  no regional wall motion abnormalities. Wall thickness was mildly increased. There was mild concentric hypertrophy. Doppler parameters were consistent with abnormal left ventricular relaxation   (grade 1 diastolic dysfunction). Right ventricle: Systolic function was normal.    INDICATIONS: Dyspnea/shortness of breath. Edema. HISTORY: Prior history: Risk factors: hypertension, oral hypoglycemic-treated   diabetes, and medication-treated  hypercholesterolemia. PROCEDURE: This was a routine study. The study included complete 2D imaging,   M-mode, complete spectral Doppler, and  color Doppler. The heart rate was 98 bpm, at the start of the study. Systolic   blood pressure was 132 mmHg, at the start  of the study. Diastolic blood pressure was 67 mmHg, at the start of the   study.  Images were obtained from the  parasternal, apical, and subcostal acoustic windows. Image quality was   adequate. LEFT VENTRICLE: Size was normal. Systolic function was normal. Ejection   fraction was estimated in the range of 55 % to  60 %. There were no regional wall motion abnormalities. Wall thickness was   mildly increased. There was mild concentric  hypertrophy. DOPPLER: Doppler parameters were consistent with abnormal left   ventricular relaxation (grade 1 diastolic  dysfunction). RIGHT VENTRICLE: The size was normal. Systolic function was normal.    LEFT ATRIUM: Size was normal. LA volume index was 21 ml/m-sq. RIGHT ATRIUM: Size was normal.    MITRAL VALVE: Normal valve structure. There was normal leaflet separation. DOPPLER: There was no evidence for stenosis. There was no significant regurgitation. AORTIC VALVE: The valve was trileaflet. Leaflets exhibited normal thickness   and normal cuspal separation. DOPPLER:  There was no stenosis. There was no regurgitation. TRICUSPID VALVE: Normal valve structure. DOPPLER: There was no evidence for   tricuspid stenosis. There was no  regurgitation. PULMONIC VALVE: Not well visualized, but normal Doppler findings. DOPPLER:   There was no regurgitation. AORTA: The root exhibited normal size. PULMONARY ARTERY: The size was normal. DOPPLER: Systolic pressure was within   the normal range. SYSTEMIC VEINS: IVC: The inferior vena cava was normal in size. PERICARDIUM: No significant pericardial effusion identified. MEASUREMENT TABLES    2D measurements  Right atrium   (Reference normals)  Area sys   14 cm-sq   (8.3-19. 5)    Doppler measurements  Left ventricle   (Reference normals)  Ea, lat [place], tiss DP   5 cm/s   (--)  Ea, med [place], tiss DP   5 cm/s   (--)    SYSTEM MEASUREMENT TABLES    2D  Ao Diam: 2.87 cm  IVSd: 1.16 cm  LVIDd: 4.56 cm  LVIDs: 2.7 cm  LVPWd: 1.11 cm  SV(Teich): 68.08 ml  EDV(Teich): 95.19 ml  ESV(Cube): 19.76 ml  ESV(Teich): 27.11 ml  LAAs A2C: 14.88 cm2  LAAs A4C: 16.09 cm2  LAESV A-L A2C: 41.23 ml  LAESV A-L A4C: 42.31 ml  LAESV Index (A-L): 21.13 ml/m2  LAESV MOD A2C: 39.37 ml  LAESV MOD A4C: 41.26 ml  LAESV(A-L): 44.57 ml  LALs A2C: 4.56 cm  LALs A4C: 5.19 cm  LVOT Diam: 2.06 cm    PW  LVOT VTI: 22.32 cm  LVOT Vmax: 1.21 m/s  LVOT Vmean: 0.86 m/s  MV A Brandin: 1.27 m/s  MV Dec [place]: 7.16 m/s2  MV DecT: 117.77 ms  MV E Brandin: 0.84 m/s  MV E/A Ratio: 0.66  LVOT Env. Ti: 259.59 ms  LVOT maxP.82 mmHG  LVOT meanPG: 3.24 mmHG  LVSI Dopp: 35.25 ml/m2  LVSV Dopp: 74.37 ml    Prepared and E-signed by    Zenon Cole MD  Signed 30-Aug-2017 07:54:54  ASSESSMENT and PLAN    ICD-10-CM ICD-9-CM    1. Intermittent left-sided chest pain R07.89 786.59 CTA CHEST W OR W WO CONT      EKG, 12 LEAD, INITIAL      REFERRAL TO CARDIOLOGY      METABOLIC PANEL, COMPREHENSIVE      CBC WITH AUTOMATED DIFF      TSH 3RD GENERATION      D DIMER   2. SOBOE (shortness of breath on exertion) R06.02 786.05 CTA CHEST W OR W WO CONT      EKG, 12 LEAD, INITIAL      REFERRAL TO CARDIOLOGY      METABOLIC PANEL, COMPREHENSIVE      CBC WITH AUTOMATED DIFF      TSH 3RD GENERATION      D DIMER   3. Acute diastolic CHF (congestive heart failure) (HCC) I50.31 428.31 EKG, 12 LEAD, INITIAL     428.0 REFERRAL TO CARDIOLOGY      METABOLIC PANEL, COMPREHENSIVE      CBC WITH AUTOMATED DIFF      TSH 3RD GENERATION      D DIMER   4. Mild diastolic dysfunction Z40.0 429.9 REFERRAL TO CARDIOLOGY      METABOLIC PANEL, COMPREHENSIVE      CBC WITH AUTOMATED DIFF      TSH 3RD GENERATION      D DIMER   5. LVH (left ventricular hypertrophy) I51.7 429.3 REFERRAL TO CARDIOLOGY      METABOLIC PANEL, COMPREHENSIVE      CBC WITH AUTOMATED DIFF      TSH 3RD GENERATION      D DIMER   6. Positive D dimer R79.89 790.92 CTA CHEST W OR W WO CONT      METABOLIC PANEL, COMPREHENSIVE      CBC WITH AUTOMATED DIFF      TSH 3RD GENERATION      D DIMER   7.  Uncontrolled type 2 diabetes mellitus without complication, with long-term current use of insulin ([facility] Y35.72 315.80 METABOLIC PANEL, COMPREHENSIVE    Z79.4 V58.67 CBC WITH AUTOMATED DIFF      TSH 3RD GENERATION      D DIMER      REFERRAL TO OPHTHALMOLOGY      CANCELED: AMB POC GLUCOSE, QUANTITATIVE, BLOOD   8. Urinary frequency R35.0 788.41 AMB POC URINALYSIS DIP STICK AUTO W/O MICRO   Visit based of time 50 minutes total,  with more than 50% of the face-to-face visit time spent in counseling on review of echocardiogram diastolic dysfunction new complaint of chest pain continued shortness of breath on exertion differential diagnosis risk with diastolic dysfunction of likely diastolic heart failure also risk of pulmonary embolism ordered stat CT scan, it's treatment, prognosis, management advise, plan and follow-up recommendations. Follow-up Disposition:  Return in about 2 weeks (around 10/18/2017).

## 2017-10-04 NOTE — MR AVS SNAPSHOT
Visit Information Date & Time Provider Department Dept. Phone Encounter #  
 10/4/2017  8:15 AM Glenn Matthews MD [address] [facility] [phone] [id number] Follow-up Instructions Return in about 2 weeks (around 10/18/2017). Your Appointments 12/13/2017  1:45 PM  
Follow Up with Luli Floyd MD  
[facility] at [address] [facility]) Appt Note: 6mon f/u; rs'd from 10/20/2017 to [facility]  Pt aware of location; rs'd from 11/03/2017  provider out of office [address] [facility] [address] [address] [address] 200 [facility] Upcoming Health Maintenance Date Due DTaP/Tdap/Td series (1 - Tdap) 1/21/1964 GLAUCOMA SCREENING Q2Y 11/25/2015 EYE EXAM RETINAL OR DILATED Q1 11/17/2017* LIPID PANEL Q1 1/27/2018 MICROALBUMIN Q1 1/28/2018 HEMOGLOBIN A1C Q6M 2/17/2018 Pneumococcal 65+ Low/Medium Risk (2 of 2 - PPSV23) 3/8/2018 FOOT EXAM Q1 3/8/2018 MEDICARE YEARLY EXAM 3/9/2018 COLONOSCOPY 9/20/2019 *Topic was postponed. The date shown is not the original due date. Allergies as of 10/4/2017  Review Complete On: 10/4/2017 By: Glenn Matthews MD  
  
 Severity Noted Reaction Type Reactions Allegra-d 12 Hour [Fexofenadine-pseudoephedrine]  09/23/2011    Shortness of Breath Zocor [Simvastatin]  06/11/2010    Other (comments) Intolerance, muscle ache Current Immunizations  Reviewed on 3/8/2017 No immunizations on file. Not reviewed this visit You Were Diagnosed With   
  
 Codes Comments Intermittent left-sided chest pain    -  Primary ICD-10-CM: R07.89 ICD-9-CM: 786.59 SOBOE (shortness of breath on exertion)     ICD-10-CM: R06.02 
ICD-9-CM: 786.05 Acute diastolic CHF (congestive heart failure) (HCC)     ICD-10-CM: I50.31 ICD-9-CM: 428.31, 428.0 Mild diastolic dysfunction     EIZ-57-CM: I51.9 ICD-9-CM: 429.9 LVH (left ventricular hypertrophy)     ICD-10-CM: I51.7 ICD-9-CM: 429.3 Positive D dimer     ICD-10-CM: R79.89 ICD-9-CM: 790.92 Uncontrolled type 2 diabetes mellitus without complication, with long-term current use of insulin (HCC)     ICD-10-CM: E11.65, Z79.4 ICD-9-CM: 250.02, V58.67 Urinary frequency     ICD-10-CM: R35.0 ICD-9-CM: 788.41 Vitals BP Pulse Temp Resp Height(growth percentile) Weight(growth percentile) 113/61 (BP 1 Location: Right arm, BP Patient Position: Sitting) 87 97.1 °F (36.2 °C) (Oral) 20 5' 5\" (1.651 m) 236 lb (107 kg) BMI OB Status Smoking Status 39.27 kg/m2 Hysterectomy Former Smoker BMI and BSA Data Body Mass Index Body Surface Area  
 39.27 kg/m 2 2.22 m 2 Preferred Pharmacy Pharmacy Name Phone [facility], Marybel Barrios [phone], [address] [phone] Your Updated Medication List  
  
   
This list is accurate as of: 10/4/17  9:23 AM.  Always use your most recent med list.  
  
  
  
  
 aspirin delayed-release 81 mg tablet Take 1 Tab by mouth daily. Blood-Glucose Meter monitoring kit Free style lite glucose meter. Test blood sugar 2 times a day. Dx: E11.65 COD LIVER OIL 4,000 unit-400 unit/5 mL Liqd Generic drug:  vit A and D3 in cod liver oil Take  by mouth. 1 tablespoon daily  
  
 glucose blood VI test strips strip Commonly known as:  blood glucose test  
Freestyle lite test strips. Test 2 times a day. Dx: E11.9  
  
 insulin aspart 100 unit/mL Inpn Commonly known as:  NOVOLOG  
0.1 mL by SubCUTAneous route Before breakfast, lunch, and dinner. insulin glargine 100 unit/mL (3 mL) Inpn Commonly known as:  LANTUS SOLOSTAR  
45 units in the morning and 30 units at night Insulin Needles (Disposable) 31 gauge x 3/16\" Ndle Commonly known as:  BD INSULIN PEN NEEDLE UF MINI Use with Lantus Insulin injections twice a day Lancets Misc Lancets for Free Style lite glucose meter. Test blood sugar 2 times a day. Dx; E11.65  
  
 NIFEdipine ER 30 mg ER tablet Commonly known as:  ADALAT CC Take 2 Tabs by mouth daily. OTHER Ketamine 10%/Gabapentin 6%/ Baclofen 2%/ Cyclobenzaprine 2%/ Lidocaine 2%/ Flurbiprofen 10% Apply 1-2 Grams to the affected area 3-4 Times per day. QTY-180 GM REFILLS-5  
  
 rosuvastatin 5 mg tablet Commonly known as:  CRESTOR Take 1 Tab by mouth nightly. SITagliptin 100 mg tablet Commonly known as:  Vignesh Goehner Take 1 Tab by mouth daily. We Performed the Following AMB POC URINALYSIS DIP STICK AUTO W/O MICRO [03374 CPT(R)] REFERRAL TO CARDIOLOGY [OLD42 Custom] Comments:  
 Please evaluate for cp, diastolic heart failure REFERRAL TO OPHTHALMOLOGY [REF57 Custom] Follow-up Instructions Return in about 2 weeks (around 10/18/2017). To-Do List   
 10/04/2017 Lab:  CBC WITH AUTOMATED DIFF   
  
 10/04/2017 Imaging:  CTA CHEST W OR W WO CONT   
  
 10/04/2017 Lab:  D DIMER   
  
 10/04/2017 ECG:  EKG, 12 LEAD, INITIAL   
  
 10/04/2017 Lab:  METABOLIC PANEL, COMPREHENSIVE   
  
 10/04/2017 Lab:  TSH 3RD GENERATION Referral Information Referral ID Referred By Referred To  
  
 [id number] [facility], [address] [facility] [address] [place], [address] [facility] [address] Phone: [phone] Fax: [phone] Visits Status Start Date End Date 1 New Request 10/4/17 10/4/18 If your referral has a status of pending review or denied, additional information will be sent to support the outcome of this decision. Referral ID Referred By Referred To  
 [id number] [facility] Not Available Visits Status Start Date End Date 1 New Request 10/4/17 10/4/18 If your referral has a status of pending review or denied, additional information will be sent to support the outcome of this decision. Patient Instructions Please contact our office if you have any questions about your visit today. Please provide this summary of care documentation to your next provider. Your primary care clinician is listed as BRENNA WEAVER. If you have any questions after today's visit, please call [phone].

## 2017-10-06 ENCOUNTER — HOSPITAL ENCOUNTER (OUTPATIENT)
Dept: LAB | Age: 72
Discharge: HOME | End: 2017-10-06
Payer: MEDICARE

## 2017-10-06 DIAGNOSIS — R06.02 SOBOE (SHORTNESS OF BREATH ON EXERTION): ICD-10-CM

## 2017-10-06 DIAGNOSIS — I50.31 ACUTE DIASTOLIC CHF (CONGESTIVE HEART FAILURE) (HCC): ICD-10-CM

## 2017-10-06 DIAGNOSIS — R07.89 INTERMITTENT LEFT-SIDED CHEST PAIN: ICD-10-CM

## 2017-10-06 LAB
ATRIAL RATE: 74 BPM
CALCULATED P AXIS, ECG09: 40 DEGREES
CALCULATED R AXIS, ECG10: -22 DEGREES
CALCULATED T AXIS, ECG11: 51 DEGREES
DIAGNOSIS, 93000: NORMAL
P-R INTERVAL, ECG05: 160 MS
Q-T INTERVAL, ECG07: 396 MS
QRS DURATION, ECG06: 94 MS
QTC CALCULATION (BEZET), ECG08: 439 MS
VENTRICULAR RATE, ECG03: 74 BPM

## 2017-10-06 PROCEDURE — 93005 ELECTROCARDIOGRAM TRACING: CPT

## 2017-10-13 ENCOUNTER — OFFICE VISIT (OUTPATIENT)
Dept: CARDIOLOGY CLINIC | Age: 72
End: 2017-10-13

## 2017-10-13 VITALS
DIASTOLIC BLOOD PRESSURE: 70 MMHG | SYSTOLIC BLOOD PRESSURE: 130 MMHG | OXYGEN SATURATION: 98 % | HEART RATE: 84 BPM | WEIGHT: 240 LBS | HEIGHT: 65 IN | BODY MASS INDEX: 39.99 KG/M2

## 2017-10-13 DIAGNOSIS — R07.9 CHEST PAIN, UNSPECIFIED TYPE: Primary | ICD-10-CM

## 2017-10-13 DIAGNOSIS — R06.02 SOB (SHORTNESS OF BREATH): ICD-10-CM

## 2017-10-13 DIAGNOSIS — I10 ESSENTIAL HYPERTENSION: ICD-10-CM

## 2017-10-13 DIAGNOSIS — E78.5 HYPERLIPIDEMIA, UNSPECIFIED HYPERLIPIDEMIA TYPE: ICD-10-CM

## 2017-10-13 DIAGNOSIS — E66.01 MORBID OBESITY (HCC): ICD-10-CM

## 2017-10-13 NOTE — MR AVS SNAPSHOT
Visit Information Date & Time Provider Department Dept. Phone Encounter #  
 10/13/2017  1:00 PM Dkaota Estrada MD [facility] [phone] Your Appointments 11/10/2017  8:20 AM  
Follow Up with Dakota Estrada MD  
[facility] ([address]) Appt Note: f/u after testing -[facility] Eris [place] [id number] [phone] Rowdy Perez  
  
    
 12/13/2017  1:45 PM  
Follow Up with Lisha Lopez MD  
[address] at [address] [facility] 365[address] [facility]) Appt Note: 6mon f/u; rs'd from 10/20/2017 to HBV  Pt aware of location; rs'd from 11/03/2017  provider out of office [address] [facility] [address] Christina Bullard [address] 200 [facility] Upcoming Health Maintenance Date Due DTaP/Tdap/Td series (1 - Tdap) 1/21/1964 GLAUCOMA SCREENING Q2Y 11/25/2015 EYE EXAM RETINAL OR DILATED Q1 11/17/2017* LIPID PANEL Q1 1/27/2018 MICROALBUMIN Q1 1/28/2018 HEMOGLOBIN A1C Q6M 2/17/2018 Pneumococcal 65+ Low/Medium Risk (2 of 2 - PPSV23) 3/8/2018 FOOT EXAM Q1 3/8/2018 MEDICARE YEARLY EXAM 3/9/2018 COLONOSCOPY 9/20/2019 *Topic was postponed. The date shown is not the original due date. Allergies as of 10/13/2017  Review Complete On: 10/13/2017 By: Alem Callejas Severity Noted Reaction Type Reactions Allegra-d 12 Hour [Fexofenadine-pseudoephedrine]  09/23/2011    Shortness of Breath Zocor [Simvastatin]  06/11/2010    Other (comments) Intolerance, muscle ache Current Immunizations  Reviewed on 3/8/2017 No immunizations on file. Not reviewed this visit You Were Diagnosed With   
  
 Codes Comments Chest pain, unspecified type    -  Primary ICD-10-CM: R07.9 ICD-9-CM: 786.50   
 SOB (shortness of breath)     ICD-10-CM: R06.02 
ICD-9-CM: 786.05 Vitals BP Pulse Height(growth percentile) Weight(growth percentile) SpO2 BMI  
 130/70 84 5' 5\" (1.651 m) 240 lb (108.9 kg) 98% 39.94 kg/m2 OB Status Smoking Status Hysterectomy Former Smoker Vitals History BMI and BSA Data Body Mass Index Body Surface Area  
 39.94 kg/m 2 2.23 m 2 Preferred Pharmacy Pharmacy Name Phone Marybel Helms [phone], [address] [phone] Your Updated Medication List  
  
   
This list is accurate as of: 10/13/17  2:07 PM.  Always use your most recent med list.  
  
  
  
  
 aspirin delayed-release 81 mg tablet Take 1 Tab by mouth daily. Blood-Glucose Meter monitoring kit Free style lite glucose meter. Test blood sugar 2 times a day. Dx: E11.65 COD LIVER OIL 4,000 unit-400 unit/5 mL Liqd Generic drug:  vit A and D3 in cod liver oil Take  by mouth. 1 tablespoon daily  
  
 glucose blood VI test strips strip Commonly known as:  blood glucose test  
Freestyle lite test strips. Test 2 times a day. Dx: E11.9  
  
 insulin aspart 100 unit/mL Inpn Commonly known as:  NOVOLOG  
0.1 mL by SubCUTAneous route Before breakfast, lunch, and dinner. insulin glargine 100 unit/mL (3 mL) Inpn Commonly known as:  LANTUS SOLOSTAR  
45 units in the morning and 30 units at night Insulin Needles (Disposable) 31 gauge x 3/16\" Ndle Commonly known as:  BD INSULIN PEN NEEDLE UF MINI Use with Lantus Insulin injections twice a day Lancets Misc Lancets for Free Style lite glucose meter. Test blood sugar 2 times a day. Dx; E11.65  
  
 NIFEdipine ER 30 mg ER tablet Commonly known as:  ADALAT CC Take 2 Tabs by mouth daily. OTHER Ketamine 10%/Gabapentin 6%/ Baclofen 2%/ Cyclobenzaprine 2%/ Lidocaine 2%/ Flurbiprofen 10% Apply 1-2 Grams to the affected area 3-4 Times per day. QTY-180 GM REFILLS-5  
  
 rosuvastatin 5 mg tablet Commonly known as:  CRESTOR Take 1 Tab by mouth nightly. SITagliptin 100 mg tablet Commonly known as:  Judeth Blank Take 1 Tab by mouth daily. To-Do List   
 10/13/2017 ECG:  CARDIAC SPECT REST AND STRESS   
  
 10/13/2017 ECG:  NUCLEAR STRESS TEST Patient Instructions Continue current medications. If you have any further questions or concerns, please contact our office. [phone] Please provide this summary of care documentation to your next provider. Your primary care clinician is listed as BRENNA WEAVER. If you have any questions after today's visit, please call [phone].

## 2017-10-13 NOTE — PATIENT INSTRUCTIONS
Continue current medications.   If you have any further questions or concerns, please contact our office. [phone]

## 2017-10-13 NOTE — PROGRESS NOTES
1. Have you been to the ER, urgent care clinic since your last visit? Hospitalized since your last visit? no  2. Have you seen or consulted any other health care providers outside of the [address] [place] [address] since your last visit? Include any pap smears or colon screening.  no

## 2017-10-14 NOTE — PROGRESS NOTES
PATIENT NAME: Elizabeth Hernández         74 y.o.      1945              DATE:10/13/2017    REASON FOR VISIT: Chest pain. Edema. HISTORY OF PRESENT ILLNESS: He had intermittent chest discomfort approximately 2 weeks ago. This was described as a left precordial discomfort. She is inconsistent about describing it. On one hand states that this was constant for a week and on the other hand states that it was intermittent. As best I can tell it would recur for a few minutes at a time not related to position or exertion. She does not know whether it was accompanied by shortness of breath. Denies palpitation, syncope, presyncope. The patient is a hypertensive diabetic. She has a history of hyperlipidemia. There is no history of coronary artery disease. She is chronically short of breath on exertion. She experiences edema in both lower extremities. This is painless. She is taking nifedipine for the control of blood pressure. She is not on a diuretic because of concerns about her renal function. PAST MEDICAL HISTORY:   Past Medical History:  No date: Anemia NEC  5/15/2017: BMI 39.0-39.9,adult  No date: Chest pain, atypical  No date: CKD (chronic kidney disease)  No date: Constipation  No date: Diabetes ([facility] [address]  No date: Ectopic pregnancy  10/14/2013: History of Holter monitoring      Comment: Baseline sinus rhythm w/extremely infrequent                PVCs, which are likely cause of patient's                symptoms. No date: Hyperlipidemia  No date: Hypertension  09/24/2010: Normal stress echo      Comment: Normal study after maximal exercise. No                symptoms. Ex time 6 min. EF 60%.       PAST SURGICAL HISTORY:   Past Surgical History:  9/15/2016: COLONOSCOPY N/A      Comment: COLONOSCOPYwith polypectomies performed by                Chandan Fitzpatrick MD at [facility] ENDOSCOPY  No date: HX HYSTERECTOMY      SOCIAL HISTORY:  Social History    Marital status:              Spouse name: Years of education:                 Number of children:               Social History Main Topics    Smoking status: Former Smoker                                                                Packs/day: 0.00      Years: 0.00        Smokeless status: Never Used                        Alcohol use: No              Drug use: No                ALLERGIES:    -- Allegra-D 12 Hour [Fexofenadine-Pseudoephedrine] -- Shortness of Breath   -- Zocor [Simvastatin] -- Other (comments)    --  Intolerance, muscle ache     CURRENT MEDICATIONS:   Current Outpatient Prescriptions:  rosuvastatin (CRESTOR) 5 mg tablet, Take 1 Tab by mouth nightly. NIFEdipine ER (ADALAT CC) 30 mg ER tablet, Take 2 Tabs by mouth daily. Insulin Needles, Disposable, (BD INSULIN PEN NEEDLE UF MINI) 31 gauge x 3/16\" ndle, Use with Lantus Insulin injections twice a day  insulin glargine (LANTUS SOLOSTAR) 100 unit/mL (3 mL) pen, 45 units in the morning and 30 units at night (Patient taking differently: 50 units in the morning and 35 units at night)  insulin aspart (NOVOLOG) 100 unit/mL inpn, 0.1 mL by SubCUTAneous route Before breakfast, lunch, and dinner. SITagliptin (JANUVIA) 100 mg tablet, Take 1 Tab by mouth daily. aspirin delayed-release 81 mg tablet, Take 1 Tab by mouth daily. glucose blood VI test strips (BLOOD GLUCOSE TEST) strip, Freestyle lite test strips. Test 2 times a day. Dx: E11.9  Blood-Glucose Meter monitoring kit, Free style lite glucose meter. Test blood sugar 2 times a day. Dx: E11.65  Lancets misc, Lancets for Free Style lite glucose meter. Test blood sugar 2 times a day. Dx; E11.65  OTHER, Ketamine 10%/Gabapentin 6%/ Baclofen 2%/ Cyclobenzaprine 2%/ Lidocaine 2%/ Flurbiprofen 10%Apply 1-2 Grams to the affected area 3-4 Times per day. QTY-180 GMREFILLS-5  vit A & D3 in cod liver oil (COD LIVER OIL) 4,000 unit-400 unit/5 mL Liqd, Take  by mouth.  1 tablespoon daily    No current facility-administered medications for this visit. REVIEW of SYSTEMS:History obtained from chart review and the patient  General ROS: negative for - weight gain or weight loss  Hematological and Lymphatic ROS: negative for - bleeding problems  Respiratory ROS: See history of present illness  Cardiovascular ROS: See history of present illness  Musculoskeletal ROS: Discomfort in both lower extremities. Will be unable to walk on a treadmill     PHYSICAL EXAMINATION:   /70  Pulse 84  Ht 5' 5\" (1.651 m)  Wt 108.9 kg (240 lb)  SpO2 98%  BMI 39.94 kg/m2  BP Readings from Last 3 Encounters:  10/13/17 : 130/70  10/04/17 : 113/61  08/17/17 : 118/69    Pulse Readings from Last 3 Encounters:  10/13/17 : 84  10/04/17 : 87  08/17/17 : 90    Wt Readings from Last 3 Encounters:  10/13/17 : 108.9 kg (240 lb)  10/04/17 : 107 kg (236 lb)  08/17/17 : 107.3 kg (236 lb 8 oz)    General: Obese -American female in no apparent distress. HEENT: Sclera clear. Mucous membranes pink and moist.  Neck: No jugular venous distention. Carotid upstrokes 2+ without bruits. Chest: Clear to  auscultation. Heart: PMI not palpable. Regular rhythm. No murmur or gallop. Abdomen: Nontender without masses or organomegaly. Extremities: No edema. Skin: Warm and dry. No stasis changes. Neuro: Alert, oriented, speech WNL, no facial asymmetry. Gait WNL. EKG: 10/6/17. Small Q-wave in lead V2. Cannot rule out prior anteroseptal myocardial infarction age-indeterminate    IMPRESSION:   Chest pain unknown etiology. Difficult to characterize. The patient is somewhat inconsistent about the history. As best I can tell, this was a left precordial discomfort that occurred intermittently over the course of a week or so. It has resolved and not recurred since  Edema. No evidence for right heart failure on exam or echocardiography. The patient's nifedipine could certainly be contributing to this. Abnormal EKG.   The tiny Q-wave in lead V2 could be consistent with a prior anteroseptal myocardial infarction, but there are no wall motion abnormalities on echo to support this diagnosis  Difficult to support the diagnosis of diastolic congestive heart failure. The patient's left ventricular hypertrophy is mild. The diastolic dysfunction is grade 1 which could actually be normal for her age. Suspect that the shortness of breath is probably more a function of her overweight. PLAN:  The patient will not be able to ambulate on a treadmill. Pharmacologic stress testing and Cardiolite    The diagnoses and plan were discussed with patient. All questions answered. Plan of care agreed to by all concerned. Catia García MD       ,

## 2017-10-19 ENCOUNTER — OFFICE VISIT (OUTPATIENT)
Dept: FAMILY MEDICINE CLINIC | Age: 72
End: 2017-10-19

## 2017-10-19 VITALS
DIASTOLIC BLOOD PRESSURE: 63 MMHG | BODY MASS INDEX: 39.7 KG/M2 | WEIGHT: 238.25 LBS | HEART RATE: 100 BPM | TEMPERATURE: 98.2 F | HEIGHT: 65 IN | SYSTOLIC BLOOD PRESSURE: 124 MMHG | RESPIRATION RATE: 20 BRPM

## 2017-10-19 DIAGNOSIS — M21.619 BUNION OF GREAT TOE: ICD-10-CM

## 2017-10-19 DIAGNOSIS — R06.02 SOBOE (SHORTNESS OF BREATH ON EXERTION): Primary | ICD-10-CM

## 2017-10-19 DIAGNOSIS — M21.969 ACQUIRED DEFORMITY OF FOOT, UNSPECIFIED LATERALITY: ICD-10-CM

## 2017-10-19 NOTE — PROGRESS NOTES
HISTORY OF PRESENT ILLNESS  Luana Puentes is a 74 y.o. female. Chief Complaint   Patient presents with    Follow-up     2 week f/u    Chest Pain scheduled for stress test, referred cardiology     left side    Shortness of Breath ongoing    CHF    Other     LVH    Diabetes Chronic problem, uncontrolled     Results     discuss lab and CT results      HPI  Past Medical History:   Diagnosis Date    Anemia NEC     BMI 39.0-39.9,adult 5/15/2017    Chest pain, atypical     CKD (chronic kidney disease)     Constipation     Diabetes ([address]     Ectopic pregnancy     History of Holter monitoring 10/14/2013    Baseline sinus rhythm w/extremely infrequent PVCs, which are likely cause of patient's symptoms.  Hyperlipidemia     Hypertension     Normal stress echo 09/24/2010    Normal study after maximal exercise. No symptoms. Ex time 6 min. EF 60%. Current Outpatient Prescriptions   Medication Sig Dispense Refill    rosuvastatin (CRESTOR) 5 mg tablet Take 1 Tab by mouth nightly. 90 Tab 3    NIFEdipine ER (ADALAT CC) 30 mg ER tablet Take 2 Tabs by mouth daily. 180 Tab 3    Insulin Needles, Disposable, (BD INSULIN PEN NEEDLE UF MINI) 31 gauge x 3/16\" ndle Use with Lantus Insulin injections twice a day 200 Pen Needle 3    insulin glargine (LANTUS SOLOSTAR) 100 unit/mL (3 mL) pen 45 units in the morning and 30 units at night (Patient taking differently: 50 units in the morning and 35 units at night) 3 Each 3    insulin aspart (NOVOLOG) 100 unit/mL inpn 0.1 mL by SubCUTAneous route Before breakfast, lunch, and dinner. 5 Pen 6    SITagliptin (JANUVIA) 100 mg tablet Take 1 Tab by mouth daily. 90 Tab 3    aspirin delayed-release 81 mg tablet Take 1 Tab by mouth daily. 90 Tab 3    glucose blood VI test strips (BLOOD GLUCOSE TEST) strip Freestyle lite test strips. Test 2 times a day. Dx: E11.9 200 Strip 3    Blood-Glucose Meter monitoring kit Free style lite glucose meter.  Test blood sugar 2 times a day. Dx: E11.65 1 Kit 0    Lancets misc Lancets for Free Style lite glucose meter. Test blood sugar 2 times a day. Dx; E11.65 200 Each 3    OTHER Ketamine 10%/Gabapentin 6%/ Baclofen 2%/ Cyclobenzaprine 2%/ Lidocaine 2%/ Flurbiprofen 10%  Apply 1-2 Grams to the affected area 3-4 Times per day. QTY-180 GM  REFILLS-5      vit A & D3 in cod liver oil (COD LIVER OIL) 4,000 unit-400 unit/5 mL Liqd Take  by mouth. 1 tablespoon daily       Allergies   Allergen Reactions    Allegra-D 12 Hour [Fexofenadine-Pseudoephedrine] Shortness of Breath    Zocor [Simvastatin] Other (comments)     Intolerance, muscle ache       Review of Systems   Respiratory: Positive for shortness of breath. Cardiovascular: Positive for chest pain, palpitations and leg swelling. Visit Vitals    /63 (BP 1 Location: Left arm, BP Patient Position: Sitting)    Pulse 100    Temp 98.2 °F (36.8 °C) (Oral)    Resp 20    Ht 5' 5\" (1.651 m)    Wt 238 lb 4 oz (108.1 kg)    BMI 39.65 kg/m2       Physical Exam   Constitutional: She is oriented to person, place, and time. She appears well-developed and well-nourished. No distress. HENT:   Mouth/Throat: Oropharynx is clear and moist.   Cardiovascular: Normal rate and normal heart sounds. Pulmonary/Chest: Effort normal and breath sounds normal. No respiratory distress. She has no wheezes. She has no rales. Musculoskeletal: She exhibits edema. She exhibits no tenderness. Neurological: She is alert and oriented to person, place, and time. She exhibits normal muscle tone. Coordination normal.   Skin: No rash noted. No erythema. No pallor. Psychiatric: She has a normal mood and affect. Her behavior is normal.   Nursing note and vitals reviewed.   Examination of the feet reveals warm, good capillary refill, no trophic changes or ulcerative lesions, normal DP and PT pulses and normal sensory exam. Bunion foot deformity noted    CT angiogram pulmonary arteries, CT chest with 80 cc Isovue-370 IV contrast     Axial CT images were obtained. Sagittal and coronal MIP images of pulmonary  arteries were generated.     All CT scans at this facility are performed using dose optimization technique as  appropriate to a performed exam, to include automated exposure control,  adjustment of the mA and/or kV according to patient size (including appropriate  matching for site specific examination) or use of iterative reconstruction  technique.     No evidence of pulmonary embolism. Aorta is normal in caliber. No dissection. No  mediastinal or hilar mass. No cardiomegaly or pericardial effusion. Lungs are  clear with minimal scarring suspected at the lung bases. No consolidation or  pleural effusion. No pneumothorax.     No acute findings in the upper abdomen.     IMPRESSION  IMPRESSION: No pulmonary embolism, aortic dissection or acute pulmonary disease. ASSESSMENT and PLAN    ICD-10-CM ICD-9-CM    1. SOBOE (shortness of breath on exertion) neg workup so far likely deconditioning, weight related R06.02 786.05    2. Bunion of great toe M21.619 727.1    3. Acquired deformity of foot, unspecified laterality M21.969 736.70    4.  Uncontrolled type 2 diabetes mellitus with complication, with long-term current use of insulin ([facility]) E11.8 250.82     E11.65 V58.67     Z79.4

## 2017-10-19 NOTE — PROGRESS NOTES
Chief Complaint   Patient presents with    Follow-up     2 week f/u    Chest Pain     left side    Shortness of Breath    CHF    Other     LVH    Diabetes    Results     discuss lab and CT results       1. Have you been to the ER, urgent care clinic since your last visit? Hospitalized since your last visit? No    2. Have you seen or consulted any other health care providers outside of the [address] [place] [address] since your last visit? Include any pap smears or colon screening.  No

## 2017-10-19 NOTE — MR AVS SNAPSHOT
Visit Information Date & Time Provider Department Dept. Phone Encounter #  
 10/19/2017 10:00 AM Rossana Guerrero MD [facility] [phone] [id number] Follow-up Instructions Return in about 1 month (around 11/19/2017). Your Appointments 11/10/2017  8:20 AM  
Follow Up with Marva Gonzalez MD  
[facility] Appt Note: f[place] after testing [place] [place] [address] [address] [address]  
[phone] Rowdy Perez  
  
    
 12/13/2017  1:45 PM  
Follow Up with Tabitha Cutler MD  
[facility] at [address] [facility] Appt Note: 6mon f/u; rs'd from 10/20/2017 to HBV  Pt aware of location; rs'd from 11/03/2017  provider out of office [address] [facility] [address] [address] [address] [facility] Upcoming Health Maintenance Date Due DTaP/Tdap/Td series (1 - Tdap) 1/21/1964 GLAUCOMA SCREENING Q2Y 11/25/2015 EYE EXAM RETINAL OR DILATED Q1 11/17/2017* LIPID PANEL Q1 1/27/2018 MICROALBUMIN Q1 1/28/2018 HEMOGLOBIN A1C Q6M 2/17/2018 Pneumococcal 65+ Low/Medium Risk (2 of 2 - PPSV23) 3/8/2018 FOOT EXAM Q1 3/8/2018 MEDICARE YEARLY EXAM 3/9/2018 COLONOSCOPY 9/20/2019 *Topic was postponed. The date shown is not the original due date. Allergies as of 10/19/2017  Review Complete On: 10/19/2017 By: Rossana Guerrero MD  
  
 Severity Noted Reaction Type Reactions Allegra-d 12 Hour [Fexofenadine-pseudoephedrine]  09/23/2011    Shortness of Breath Zocor [Simvastatin]  06/11/2010    Other (comments) Intolerance, muscle ache Current Immunizations  Reviewed on 3/8/2017 No immunizations on file. Not reviewed this visit You Were Diagnosed With   
  
 Codes Comments SOBOE (shortness of breath on exertion)    -  Primary ICD-10-CM: R06.02 
ICD-9-CM: 786.05 Bunion of great toe     ICD-10-CM: M21.619 ICD-9-CM: 727.1 Acquired deformity of foot, unspecified laterality     ICD-10-CM: M21.969 ICD-9-CM: 736.70 Uncontrolled type 2 diabetes mellitus with complication, with long-term current use of insulin (HCC)     ICD-10-CM: E11.8, E11.65, Z79.4 ICD-9-CM: 250.82, V58.67 Vitals BP Pulse Temp Resp Height(growth percentile) Weight(growth percentile) 124/63 (BP 1 Location: Left arm, BP Patient Position: Sitting) 100 98.2 °F (36.8 °C) (Oral) 20 5' 5\" (1.651 m) 238 lb 4 oz (108.1 kg) BMI OB Status Smoking Status 39.65 kg/m2 Hysterectomy Former Smoker BMI and BSA Data Body Mass Index Body Surface Area  
 39.65 kg/m 2 2.23 m 2 Preferred Pharmacy Pharmacy Name Phone [facility], Marybel Barrios [address] [phone] Your Updated Medication List  
  
   
This list is accurate as of: 10/19/17 11:29 AM.  Always use your most recent med list.  
  
  
  
  
 aspirin delayed-release 81 mg tablet Take 1 Tab by mouth daily. Blood-Glucose Meter monitoring kit Free style lite glucose meter. Test blood sugar 2 times a day. Dx: E11.65 COD LIVER OIL 4,000 unit-400 unit/5 mL Liqd Generic drug:  vit A and D3 in cod liver oil Take  by mouth. 1 tablespoon daily  
  
 glucose blood VI test strips strip Commonly known as:  blood glucose test  
Freestyle lite test strips. Test 2 times a day. Dx: E11.9  
  
 insulin aspart 100 unit/mL Inpn Commonly known as:  NOVOLOG  
0.1 mL by SubCUTAneous route Before breakfast, lunch, and dinner. insulin glargine 100 unit/mL (3 mL) Inpn Commonly known as:  LANTUS SOLOSTAR  
45 units in the morning and 30 units at night Insulin Needles (Disposable) 31 gauge x 3/16\" Ndle Commonly known as:  BD INSULIN PEN NEEDLE UF MINI Use with Lantus Insulin injections twice a day Lancets Misc Lancets for Free Style lite glucose meter. Test blood sugar 2 times a day. Dx; E11.65  
  
 NIFEdipine ER 30 mg ER tablet Commonly known as:  ADALAT CC Take 2 Tabs by mouth daily. OTHER Ketamine 10%/Gabapentin 6%/ Baclofen 2%/ Cyclobenzaprine 2%/ Lidocaine 2%/ Flurbiprofen 10% Apply 1-2 Grams to the affected area 3-4 Times per day. QTY-180 GM REFILLS-5  
  
 rosuvastatin 5 mg tablet Commonly known as:  CRESTOR Take 1 Tab by mouth nightly. SITagliptin 100 mg tablet Commonly known as:  Leatha Eastlake Take 1 Tab by mouth daily. Follow-up Instructions Return in about 1 month (around 11/19/2017). Patient Instructions Please contact our office if you have any questions about your visit today. Please provide this summary of care documentation to your next provider. Your primary care clinician is listed as BRENNA WEAVER. If you have any questions after today's visit, please call [phone].

## 2017-10-25 ENCOUNTER — HOSPITAL ENCOUNTER (OUTPATIENT)
Dept: NON INVASIVE DIAGNOSTICS | Age: 72
Discharge: HOME | End: 2017-10-25
Payer: MEDICARE

## 2017-10-25 DIAGNOSIS — R07.9 CHEST PAIN, UNSPECIFIED TYPE: ICD-10-CM

## 2017-10-25 DIAGNOSIS — R06.02 SOB (SHORTNESS OF BREATH): ICD-10-CM

## 2017-10-25 LAB
ATTENDING PHYSICIAN, CST07: NORMAL
DIAGNOSIS, 93000: NORMAL
DUKE TM SCORE RESULT, CST14: NORMAL
DUKE TREADMILL SCORE, CST13: NORMAL
ECG INTERP BEFORE EX, CST11: NORMAL
ECG INTERP DURING EX, CST12: NORMAL
FUNCTIONAL CAPACITY, CST17: NORMAL
KNOWN CARDIAC CONDITION, CST08: NORMAL
MAX. DIASTOLIC BP, CST04: 86 MMHG
MAX. HEART RATE, CST05: 129 BPM
MAX. SYSTOLIC BP, CST03: 160 MMHG
OVERALL BP RESPONSE TO EXERCISE, CST16: NORMAL
OVERALL HR RESPONSE TO EXERCISE, CST15: NORMAL
PEAK EX METS, CST10: 1.6 METS
PROTOCOL NAME, CST01: NORMAL
TEST INDICATION, CST09: NORMAL

## 2017-10-25 PROCEDURE — A9500 TC99M SESTAMIBI: HCPCS

## 2017-10-25 PROCEDURE — 93017 CV STRESS TEST TRACING ONLY: CPT

## 2017-10-25 PROCEDURE — 78452 HT MUSCLE IMAGE SPECT MULT: CPT

## 2017-10-25 PROCEDURE — 74011250636 HC RX REV CODE- 250/636

## 2017-10-25 RX ORDER — SODIUM CHLORIDE 9 MG/ML
250 INJECTION, SOLUTION INTRAVENOUS ONCE
Status: COMPLETED | OUTPATIENT
Start: 2017-10-25 | End: 2017-10-25

## 2017-10-25 RX ADMIN — REGADENOSON 0.4 MG: 0.08 INJECTION, SOLUTION INTRAVENOUS at 11:40

## 2017-10-25 RX ADMIN — SODIUM CHLORIDE 250 ML/HR: 900 INJECTION, SOLUTION INTRAVENOUS at 11:40

## 2017-10-25 NOTE — PROCEDURES
[address] STRESS    Name:  Quinn Cruz  MR#:  [id number]  :  1945  Account #:  [de-identified]  Date of Adm:  10/25/2017  Date of Service:  10/25/2017      PROCEDURES PERFORMED: Pharmacologic nuclear scan. BASELINE ELECTROCARDIOGRAM: Shows normal sinus rhythm. The patient has an IV in the right antecubital space. She received  Lexiscan per protocol. She tolerated the procedure well. No chest pain  was noted. She received resting dose of sestamibi 10.1 mCi at 9:30  a.m. She received stress dose of sestamibi 33.0 mCi at 11:40 a.m. TREADMILL FINDINGS  1. ST segment changes: None. 2. Chest pain: None. 3. Dysrhythmia: None. 4. Both heart rate and blood pressure response are normal.    TREADMILL CONCLUSION: This is a negative pharmacologic stress  test from an electrocardiographic standpoint. MYOCARDIAL NUCLEAR PERFUSION STUDY FINDINGS  1. Perfusion imaging shows no evidence of significant ongoing  ischemia or prior infarction. 2. Gated SPECT imaging shows normal left ventricular chamber size  and function with estimated ejection fraction of 56%. No obvious  regional wall motion abnormalities noted. CONCLUSIONS  1. Negative pharmacologic stress test from an electrocardiographic  standpoint. 2. Normal perfusion study. 3. Perfusion imaging shows no evidence of significant ongoing  ischemia or prior infarction. 4. Gated SPECT imaging shows normal left ventricular chamber size  and function with estimated ejection fraction of 56%. No obvious  regional wall motion abnormalities noted. 5. This is a low risk finding.         Wayna Hasting, MD Josiephine Lesch / Lefty Ball  D:  10/25/2017   14:05  T:  10/25/2017   14:42  Job #:  [id number]

## 2017-10-25 NOTE — PROGRESS NOTES
Patient was given 10.12 mCi of Sestamibi for the Resting pictures. Patient received 0.4 mg of Lexiscan for the exercise portion of the Stress test. Patient was then given 33 mCi of Sestamibi for the Stress pictures. Armband was removed and disposed of before the patient left.

## 2017-10-27 NOTE — PROGRESS NOTES
Done per your note dated 10/13 \"IMPRESSION:   Chest pain unknown etiology. Difficult to characterize. The patient is somewhat inconsistent about the history. As best I can tell, this was a left precordial discomfort that occurred intermittently over the course of a week or so. It has resolved and not recurred since  Edema. No evidence for right heart failure on exam or echocardiography. The patient's nifedipine could certainly be contributing to this. Abnormal EKG. The tiny Q-wave in lead V2 could be consistent with a prior anteroseptal myocardial infarction, but there are no wall motion abnormalities on echo to support this diagnosis  Difficult to support the diagnosis of diastolic congestive heart failure. The patient's left ventricular hypertrophy is mild. The diastolic dysfunction is grade 1 which could actually be normal for her age. Suspect that the shortness of breath is probably more a function of her overweight.     PLAN:  The patient will not be able to ambulate on a treadmill.   Pharmacologic stress testing and Cardiolite\"

## 2017-11-07 ENCOUNTER — OFFICE VISIT (OUTPATIENT)
Dept: CARDIOLOGY CLINIC | Age: 72
End: 2017-11-07

## 2017-11-07 VITALS
DIASTOLIC BLOOD PRESSURE: 72 MMHG | HEIGHT: 65 IN | BODY MASS INDEX: 38.82 KG/M2 | WEIGHT: 233 LBS | OXYGEN SATURATION: 97 % | HEART RATE: 84 BPM | SYSTOLIC BLOOD PRESSURE: 130 MMHG

## 2017-11-07 DIAGNOSIS — R60.9 EDEMA, UNSPECIFIED TYPE: ICD-10-CM

## 2017-11-07 DIAGNOSIS — I10 ESSENTIAL HYPERTENSION: Primary | ICD-10-CM

## 2017-11-07 DIAGNOSIS — R06.09 DYSPNEA ON EXERTION: ICD-10-CM

## 2017-11-07 NOTE — PROGRESS NOTES
1. Have you been to the ER, urgent care clinic since your last visit? Hospitalized since your last visit? no  2. Have you seen or consulted any other health care providers outside of the [facility] since your last visit? Include any pap smears or colon screening.   no

## 2017-11-07 NOTE — MR AVS SNAPSHOT
Visit Information Date & Time Provider Department Dept. Phone Encounter #  
 11/7/2017  9:00 AM Jaye Fleischer, MD [facility] [id number] Your Appointments 11/27/2017  7:45 AM  
Follow Up with MD Jhoana Magaña 68 (--) Appt Note: 1 month follow up Natacha Frausto [facility] [id number] [phone]  
  
   
 [address] [place] [id number]  
  
    
 12/13/2017  1:45 PM  
Follow Up with Reese Crooks MD  
[address] at [phone] [facility]) Appt Note: 6mon f/u; rs'd from 10/20/2017 to HBV  Pt aware of location; rs'd from 11/03/2017  provider out of office [address] [facility] [address] [address] [address] 200 [facility] Upcoming Health Maintenance Date Due DTaP/Tdap/Td series (1 - Tdap) 1/21/1966 GLAUCOMA SCREENING Q2Y 11/25/2015 EYE EXAM RETINAL OR DILATED Q1 11/17/2017* LIPID PANEL Q1 1/27/2018 MICROALBUMIN Q1 1/28/2018 HEMOGLOBIN A1C Q6M 2/17/2018 Pneumococcal 65+ Low/Medium Risk (2 of 2 - PPSV23) 3/8/2018 MEDICARE YEARLY EXAM 3/9/2018 FOOT EXAM Q1 10/19/2018 COLONOSCOPY 9/20/2019 *Topic was postponed. The date shown is not the original due date. Allergies as of 11/7/2017  Review Complete On: 11/7/2017 By: Rodriguez Merida Severity Noted Reaction Type Reactions Allegra-d 12 Hour [Fexofenadine-pseudoephedrine]  09/23/2011    Shortness of Breath Zocor [Simvastatin]  06/11/2010    Other (comments) Intolerance, muscle ache Current Immunizations  Reviewed on 3/8/2017 No immunizations on file. Not reviewed this visit Vitals BP Pulse Height(growth percentile) Weight(growth percentile) SpO2 BMI  
 130/72 (BP 1 Location: Left arm, BP Patient Position: Sitting) 84 5' 5\" (1.651 m) 233 lb (105.7 kg) 97% 38.77 kg/m2 OB Status Smoking Status Hysterectomy Former Smoker Vitals History BMI and BSA Data Body Mass Index Body Surface Area 38.77 kg/m 2 2.2 m 2 Preferred Pharmacy Pharmacy Name Phone [facility] [phone] [phone] Your Updated Medication List  
  
   
This list is accurate as of: 11/7/17  9:23 AM.  Always use your most recent med list.  
  
  
  
  
 aspirin delayed-release 81 mg tablet Take 1 Tab by mouth daily. Blood-Glucose Meter monitoring kit Free style lite glucose meter. Test blood sugar 2 times a day. Dx: E11.65 COD LIVER OIL 4,000 unit-400 unit/5 mL Liqd Generic drug:  vit A and D3 in cod liver oil Take  by mouth. 1 tablespoon daily  
  
 glucose blood VI test strips strip Commonly known as:  blood glucose test  
Freestyle lite test strips. Test 2 times a day. Dx: E11.9  
  
 insulin aspart 100 unit/mL Inpn Commonly known as:  NOVOLOG  
0.1 mL by SubCUTAneous route Before breakfast, lunch, and dinner. insulin glargine 100 unit/mL (3 mL) Inpn Commonly known as:  LANTUS SOLOSTAR  
45 units in the morning and 30 units at night Insulin Needles (Disposable) 31 gauge x 3/16\" Ndle Commonly known as:  BD INSULIN PEN NEEDLE UF MINI Use with Lantus Insulin injections twice a day Lancets Misc Lancets for Free Style lite glucose meter. Test blood sugar 2 times a day. Dx; E11.65  
  
 NIFEdipine ER 30 mg ER tablet Commonly known as:  ADALAT CC Take 2 Tabs by mouth daily. OTHER Ketamine 10%/Gabapentin 6%/ Baclofen 2%/ Cyclobenzaprine 2%/ Lidocaine 2%/ Flurbiprofen 10% Apply 1-2 Grams to the affected area 3-4 Times per day. QTY-180 GM REFILLS-5  
  
 rosuvastatin 5 mg tablet Commonly known as:  CRESTOR Take 1 Tab by mouth nightly. SITagliptin 100 mg tablet Commonly known as:  Lacy Erichsen Take 1 Tab by mouth daily. Please provide this summary of care documentation to your next provider. Your primary care clinician is listed as BRENNA WEAVER. If you have any questions after today's visit, please call [phone].

## 2017-11-07 NOTE — PROGRESS NOTES
PATIENT NAME: Tez Lang         74 y.o.      1945              DATE:11/7/2017    REASON FOR VISIT: Shortness of breath. Edema    HISTORY OF PRESENT ILLNESS: Continues to experience shortness of breath on exertion. Edema both lower extremities intermittently. This is mild and painless. Echocardiogram showed normal left ventricular size and wall motion. Normal ejection fraction. No valvular disease. Grade 1 diastolic dysfunction. This is not severe enough to explain the shortness of breath. Cardiolite scan negative for ischemia. CTA of the chest negative for pulmonary emboli. PAST MEDICAL HISTORY:   Past Medical History:  No date: Anemia NEC  5/15/2017: BMI 39.0-39.9,adult  No date: Chest pain, atypical  No date: CKD (chronic kidney disease)  No date: Constipation  No date: Diabetes ([facility] [address]  No date: Ectopic pregnancy  10/14/2013: History of Holter monitoring      Comment: Baseline sinus rhythm w/extremely infrequent                PVCs, which are likely cause of patient's                symptoms. No date: Hyperlipidemia  No date: Hypertension  09/24/2010: Normal stress echo      Comment: Normal study after maximal exercise. No                symptoms. Ex time 6 min. EF 60%. PAST SURGICAL HISTORY:   Past Surgical History:  9/15/2016: COLONOSCOPY N/A      Comment: COLONOSCOPYwith polypectomies performed by                Elmer Rico [facility] ENDOSCOPY  No date: HX HYSTERECTOMY      SOCIAL HISTORY:  Social History    Marital status:              Spouse name:                       Years of education:                 Number of children:               Social History Main Topics    Smoking status: Former Smoker                                                                Packs/day: 0.00      Years: 0.00        Smokeless status: Never Used                        Alcohol use: No              Drug use:  No                ALLERGIES:    -- Allegra-D 12 Hour [Fexofenadine-Pseudoephedrine] -- Shortness of Breath   -- Zocor [Simvastatin] -- Other (comments)    --  Intolerance, muscle ache     CURRENT MEDICATIONS:   Current Outpatient Prescriptions:  rosuvastatin (CRESTOR) 5 mg tablet, Take 1 Tab by mouth nightly. NIFEdipine ER (ADALAT CC) 30 mg ER tablet, Take 2 Tabs by mouth daily. Insulin Needles, Disposable, (BD INSULIN PEN NEEDLE UF MINI) 31 gauge x 3/16\" ndle, Use with Lantus Insulin injections twice a day  insulin glargine (LANTUS SOLOSTAR) 100 unit/mL (3 mL) pen, 45 units in the morning and 30 units at night (Patient taking differently: 50 units in the morning and 35 units at night)  insulin aspart (NOVOLOG) 100 unit/mL inpn, 0.1 mL by SubCUTAneous route Before breakfast, lunch, and dinner. SITagliptin (JANUVIA) 100 mg tablet, Take 1 Tab by mouth daily. aspirin delayed-release 81 mg tablet, Take 1 Tab by mouth daily. glucose blood VI test strips (BLOOD GLUCOSE TEST) strip, Freestyle lite test strips. Test 2 times a day. Dx: E11.9  Blood-Glucose Meter monitoring kit, Free style lite glucose meter. Test blood sugar 2 times a day. Dx: E11.65  Lancets misc, Lancets for Free Style lite glucose meter. Test blood sugar 2 times a day. Dx; E11.65  OTHER, Ketamine 10%/Gabapentin 6%/ Baclofen 2%/ Cyclobenzaprine 2%/ Lidocaine 2%/ Flurbiprofen 10%Apply 1-2 Grams to the affected area 3-4 Times per day. QTY-180 GMREFILLS-5  vit A & D3 in cod liver oil (COD LIVER OIL) 4,000 unit-400 unit/5 mL Liqd, Take  by mouth. 1 tablespoon daily    No current facility-administered medications for this visit.        REVIEW of SYSTEMS:Cardiovascular ROS: See history of present illness     PHYSICAL EXAMINATION:   /72 (BP 1 Location: Left arm, BP Patient Position: Sitting)  Pulse 84  Ht 5' 5\" (1.651 m)  Wt 105.7 kg (233 lb)  SpO2 97%  BMI 38.77 kg/m2  BP Readings from Last 3 Encounters:  11/07/17 : 130/72  10/19/17 : 124/63  10/13/17 : 130/70    Pulse Readings from Last 3 Encounters:  11/07/17 : 84  10/19/17 : 100  10/13/17 : 84    Wt Readings from Last 3 Encounters:  11/07/17 : 105.7 kg (233 lb)  10/19/17 : 108.1 kg (238 lb 4 oz)  10/13/17 : 108.9 kg (240 lb)    General: Obese -American female no apparent distress. Neck: No jugular venous distention. Chest: Clear to auscultation. Heart: Regular rhythm. No murmur or gallop. Extremities: Trace edema      IMPRESSION:   Exertional shortness of breath noncardiac  Edema. No evidence for right heart failure. Nifedipine might be the culprit in this situation  Obesity  Hypertension    PLAN:  The patient will be seeing her primary care physician in the near future and will discuss the possibility of changing antihypertensives. She does not require any additional cardiac testing at this time. Return to this office as needed     The diagnoses and plan were discussed with patient. All questions answered. Plan of care agreed to by all concerned. Esvin Isabel.  MD Carlos       ,

## 2017-11-27 ENCOUNTER — OFFICE VISIT (OUTPATIENT)
Dept: FAMILY MEDICINE CLINIC | Age: 72
End: 2017-11-27

## 2017-11-27 VITALS
OXYGEN SATURATION: 100 % | WEIGHT: 239 LBS | HEIGHT: 65 IN | BODY MASS INDEX: 39.82 KG/M2 | DIASTOLIC BLOOD PRESSURE: 70 MMHG | HEART RATE: 83 BPM | TEMPERATURE: 97.5 F | SYSTOLIC BLOOD PRESSURE: 154 MMHG

## 2017-11-27 DIAGNOSIS — R22.0 SWELLING OF LEFT SIDE OF FACE: ICD-10-CM

## 2017-11-27 DIAGNOSIS — L50.9 URTICARIA OF ENTIRE BODY: Primary | ICD-10-CM

## 2017-11-27 DIAGNOSIS — I10 ESSENTIAL HYPERTENSION: ICD-10-CM

## 2017-11-27 DIAGNOSIS — T78.3XXA ANGIOEDEMA, INITIAL ENCOUNTER: ICD-10-CM

## 2017-11-27 RX ORDER — METHYLPREDNISOLONE 4 MG/1
TABLET ORAL
Qty: 1 DOSE PACK | Refills: 0 | Status: SHIPPED | OUTPATIENT
Start: 2017-11-27 | End: 2018-02-12 | Stop reason: ALTCHOICE

## 2017-11-27 NOTE — PATIENT INSTRUCTIONS
Please contact our office if you have any questions about your visit today. Angioedema: Care Instructions  Your Care Instructions    Angioedema is an allergic reaction. It causes swelling and welts in the deep layers of the skin. Angioedema can sometimes occur along with hives. Hives are an allergic reaction in the outer layers of the skin. Angioedema can range from mild to severe. Painful welts can develop on the face. Angioedema can also occur on other parts of the body. In severe cases, the inside of the throat can swell and make it hard to breathe. Many things can cause this condition, including foods, insect bites, and medicines (such as aspirin and some blood pressure medicines). It also can run in families. Sometimes you may know what caused the reaction, but other times you may not know. Follow-up care is a key part of your treatment and safety. Be sure to make and go to all appointments, and call your doctor if you are having problems. It's also a good idea to know your test results and keep a list of the medicines you take. How can you care for yourself at home? · Take your medicines exactly as prescribed. Call your doctor if you think you are having a problem with your medicine. You will get more details on the specific medicines your doctor prescribes. Some medicines used to treat angioedema can make you too sleepy to drive safely. Do not drive if you take medicine that may make you sleepy. · Avoid foods or medicine that may have triggered the swelling. · For comfort:  ¨ Try taking a cool bath. Or place a cool, wet towel on the swollen area. ¨ Avoid hot baths and showers. ¨ Wear loose clothing. · Your doctor may prescribe a shot of epinephrine to carry with you in case you have a severe reaction. Learn how to give yourself the shot and keep it with you at all times. Make sure it has not . When should you call for help?   Give an epinephrine shot if:  ? · You think you are having a severe allergic reaction. ? After giving an epinephrine shot call 911, even if you feel better. ?Call 911 if:  ? · You have symptoms of a severe allergic reaction. These may include:  ¨ Sudden raised, red areas (hives) all over your body. ¨ Swelling of the throat, mouth, lips, or tongue. ¨ Trouble breathing. ¨ Passing out (losing consciousness). Or you may feel very lightheaded or suddenly feel weak, confused, or restless. ? · You have been given an epinephrine shot, even if you feel better. ?Call your doctor now or seek immediate medical care if:  ? · You have symptoms of an allergic reaction, such as:  ¨ A rash or hives (raised, red areas on the skin). ¨ Itching. ¨ Swelling. ¨ Belly pain, nausea, or vomiting. ? Watch closely for changes in your health, and be sure to contact your doctor if:  ? · You do not get better as expected. Where can you learn more? Go to http://afshan-nikki.info/. Enter U752 in the search box to learn more about \"Angioedema: Care Instructions. \"  Current as of: September 29, 2016  Content Version: 11.4  © 5134-4945 [employer]. Care instructions adapted under license by [employer] (which disclaims liability or warranty for this information). If you have questions about a medical condition or this instruction, always ask your healthcare professional. [place] [address] any warranty or liability for your use of this information. Hives: Care Instructions  Your Care Instructions  Hives are raised, red, itchy patches of skin. They are also called wheals or welts. They usually have red borders and pale centers. Hives range in size from ¼ inch to 3 inches or more across. They may seem to move from place to place on the skin. Several hives may form a large area of raised, red skin. You can get hives after an insect sting, after taking medicine or eating certain foods, or because of infection or stress.  Other causes include plants, things you breathe in, makeup, heat, cold, sunlight, and latex. You cannot spread hives to other people. Hives may last a few minutes or a few days, but a single spot may last less than 36 hours. Follow-up care is a key part of your treatment and safety. Be sure to make and go to all appointments, and call your doctor if you are having problems. It's also a good idea to know your test results and keep a list of the medicines you take. How can you care for yourself at home? · Avoid whatever you think may have caused your hives, such as a certain food or medicine. However, you may not know the cause. · Put a cool, wet towel on the area to relieve itching. · Take an over-the-counter antihistamine, such as diphenhydramine (Benadryl), cetirizine (Zyrtec), or loratadine (Claritin), to help stop the hives and calm the itching. Read and follow directions on the label. These medicines can make you feel sleepy. Do not drive while using them. · Stay away from strong soaps, detergents, and chemicals. These can make itching worse. When should you call for help? Call 911 anytime you think you may need emergency care. For example, call if:  ? · You have symptoms of a severe allergic reaction. These may include:  ¨ Sudden raised, red areas (hives) all over your body. ¨ Swelling of the throat, mouth, lips, or tongue. ¨ Trouble breathing. ¨ Passing out (losing consciousness). Or you may feel very lightheaded or suddenly feel weak, confused, or restless. ?Call your doctor now or seek immediate medical care if:  ? · You have symptoms of an allergic reaction, such as:  ¨ A rash or hives (raised, red areas on the skin). ¨ Itching. ¨ Swelling. ¨ Belly pain, nausea, or vomiting. ? · You get hives after you start a new medicine. ? · Hives have not gone away after 24 hours. ? Watch closely for changes in your health, and be sure to contact your doctor if:  ? · You do not get better as expected.    Where can you learn more? Go to http://afshan-nikki.info/. Enter Q658 in the search box to learn more about \"Hives: Care Instructions. \"  Current as of: March 20, 2017  Content Version: 11.4  © 2417-6153 [employer]. Care instructions adapted under license by [employer] (which disclaims liability or warranty for this information). If you have questions about a medical condition or this instruction, always ask your healthcare professional. [place] [address] any warranty or liability for your use of this information.

## 2017-11-27 NOTE — PROGRESS NOTES
HISTORY OF PRESENT ILLNESS  Elizabeth Hernández is a 74 y.o. female. Chief Complaint   Patient presents with    Diabetes Chronic problem, uncontrolled     Swelling     woke up with facial swelling, lips, face eyelids, denies shortness of breath    Hives     states has been having hives, itching, comes and goes, generalized, states has been ongoing for a month or so, wonders if could be a reaction to nuclear stress test    Hypertension Chronic problem, uncontrolled did not take meds today       HPI  Past Medical History:   Diagnosis Date    Anemia NEC     BMI 39.0-39.9,adult 5/15/2017    Chest pain, atypical     CKD (chronic kidney disease)     Constipation     Diabetes ([facility] [address]     Ectopic pregnancy     History of Holter monitoring 10/14/2013    Baseline sinus rhythm w/extremely infrequent PVCs, which are likely cause of patient's symptoms.  Hyperlipidemia     Hypertension     Normal stress echo 09/24/2010    Normal study after maximal exercise. No symptoms. Ex time 6 min. EF 60%. Current Outpatient Prescriptions   Medication Sig Dispense Refill    rosuvastatin (CRESTOR) 5 mg tablet Take 1 Tab by mouth nightly. 90 Tab 3    NIFEdipine ER (ADALAT CC) 30 mg ER tablet Take 2 Tabs by mouth daily. 180 Tab 3    Insulin Needles, Disposable, (BD INSULIN PEN NEEDLE UF MINI) 31 gauge x 3/16\" ndle Use with Lantus Insulin injections twice a day 200 Pen Needle 3    insulin glargine (LANTUS SOLOSTAR) 100 unit/mL (3 mL) pen 45 units in the morning and 30 units at night (Patient taking differently: 50 units in the morning and 35 units at night) 3 Each 3    insulin aspart (NOVOLOG) 100 unit/mL inpn 0.1 mL by SubCUTAneous route Before breakfast, lunch, and dinner. 5 Pen 6    SITagliptin (JANUVIA) 100 mg tablet Take 1 Tab by mouth daily. 90 Tab 3    aspirin delayed-release 81 mg tablet Take 1 Tab by mouth daily. 90 Tab 3    glucose blood VI test strips (BLOOD GLUCOSE TEST) strip Freestyle lite test strips. Test 2 times a day. Dx: E11.9 200 Strip 3    Blood-Glucose Meter monitoring kit Free style lite glucose meter. Test blood sugar 2 times a day. Dx: E11.65 1 Kit 0    Lancets misc Lancets for Free Style lite glucose meter. Test blood sugar 2 times a day. Dx; E11.65 200 Each 3    OTHER Ketamine 10%/Gabapentin 6%/ Baclofen 2%/ Cyclobenzaprine 2%/ Lidocaine 2%/ Flurbiprofen 10%  Apply 1-2 Grams to the affected area 3-4 Times per day. QTY-180 GM  REFILLS-5      vit A & D3 in cod liver oil (COD LIVER OIL) 4,000 unit-400 unit/5 mL Liqd Take  by mouth. 1 tablespoon daily       Allergies   Allergen Reactions    Allegra-D 12 Hour [Fexofenadine-Pseudoephedrine] Shortness of Breath    Zocor [Simvastatin] Other (comments)     Intolerance, muscle ache       ROS Respiratory: Negative for shortness of breath. Cardiovascular: Negative for chest pain. Genitourinary: Negative for frequency. Neurological: Negative for dizziness and headaches. Visit Vitals    /70  Comment: manual, states did not take BP med today    Pulse 83    Temp 97.5 °F (36.4 °C) (Oral)    Ht 5' 5\" (1.651 m)    Wt 239 lb (108.4 kg)    SpO2 100%    BMI 39.77 kg/m2       Physical Exam  Nursing note and vitals reviewed. Constitutional: She is oriented to person, place, and time. She appears well-developed and well-nourished. No distress. HENT:   Mouth/Throat: Oropharynx is clear and moist.   Neck: No JVD present. No thyromegaly present. Cardiovascular: Normal rate, regular rhythm and normal heart sounds. Pulmonary/Chest: Effort normal and breath sounds normal. No respiratory distress. She has no wheezes. She has no rales. Musculoskeletal: She exhibits trace edema. Lymphadenopathy:     She has no cervical adenopathy. Neurological: She is alert and oriented to person, place, and time. Coordination normal.   Psychiatric: She has a normal mood and affect.  Her behavior is normal.      ASSESSMENT and PLAN    ICD-10-CM ICD-9-CM 1. Urticaria of entire body ?offending med, try to d/c januvia for now and monitor sx, treat with medrol dose pack L50.9 708.8 methylPREDNISolone (MEDROL DOSEPACK) 4 mg tablet   2. Angioedema, initial encounter T78. 3XXA 995.1 methylPREDNISolone (MEDROL DOSEPACK) 4 mg tablet   3. Swelling of left side of face R22.0 784.2 methylPREDNISolone (MEDROL DOSEPACK) 4 mg tablet   4. Essential hypertension uncontrolled today gut reluctant to stop adalat, chronic med,  I10 401.9 methylPREDNISolone (MEDROL DOSEPACK) 4 mg tablet   5.  Uncontrolled type 2 diabetes mellitus without complication, with long-term current use of insulin ([facility] [address] E11.65 250.02 As [place] and [place] d/c will increase lantus 60 units am and 45 units pm    Z79.4 V58.67

## 2017-11-27 NOTE — PROGRESS NOTES
Chief Complaint   Patient presents with    Diabetes    Swelling     woke up with facial swelling, lips, face eyelids, denies shortness of breath    Hives     states has been having hives, itching, comes and goes, generalized    Hypertension     1. Have you been to the ER, urgent care clinic since your last visit? Hospitalized since your last visit? No    2. Have you seen or consulted any other health care providers outside of the [address] [place] [address] since your last visit? Include any pap smears or colon screening.  No

## 2017-11-27 NOTE — MR AVS SNAPSHOT
Visit Information Date & Time Provider Department Dept. Phone Encounter #  
 2017  7:45 AM Liz Higginbotham MD [address] [phone] [id number] Follow-up Instructions Return in about 2 weeks (around 2017) for 1:30 pm.  
  
Your Appointments 2017  1:45 PM  
Follow Up with Miguel Davila MD  
[address] [place] [address] at [address] [facility] Appt Note: 6mon f/u; rs'd from 10/20/2017 to HBV  Pt aware of location; rs'd from 2017  provider out of office 1212 [facility] [address] [address] [address] 200 [facility] Upcoming Health Maintenance Date Due DTaP/Tdap/Td series (1 - Tdap) 1966 GLAUCOMA SCREENING Q2Y 2015 EYE EXAM RETINAL OR DILATED Q1 2016 LIPID PANEL Q1 2018 MICROALBUMIN Q1 2018 HEMOGLOBIN A1C Q6M 2018 Pneumococcal 65+ Low/Medium Risk (2 of 2 - PPSV23) 3/8/2018 MEDICARE YEARLY EXAM 3/9/2018 FOOT EXAM Q1 10/19/2018 COLONOSCOPY 2019 Allergies as of 2017  Review Complete On: 2017 By: Liz Higginbotham MD  
  
 Severity Noted Reaction Type Reactions Allegra-d 12 Hour [Fexofenadine-pseudoephedrine]  2011    Shortness of Breath Zocor [Simvastatin]  2010    Other (comments) Intolerance, muscle ache Current Immunizations  Reviewed on 3/8/2017 No immunizations on file. Not reviewed this visit You Were Diagnosed With   
  
 Codes Comments Urticaria of entire body    -  Primary ICD-10-CM: L50.9 ICD-9-CM: 708.8 Angioedema, initial encounter     ICD-10-CM: T78. 3XXA ICD-9-CM: 227.9 Swelling of left side of face     ICD-10-CM: R22.0 ICD-9-CM: 784.2 Essential hypertension     ICD-10-CM: I10 
ICD-9-CM: 401.9  Uncontrolled type 2 diabetes mellitus without complication, with long-term current use of insulin (HCC)     ICD-10-CM: E11.65, Z79.4 ICD-9-CM: 250.02, V58.67 Vitals BP Pulse Temp Height(growth percentile) Weight(growth percentile) SpO2  
 154/70 83 97.5 °F (36.4 °C) (Oral) 5' 5\" (1.651 m) 239 lb (108.4 kg) 100% BMI OB Status Smoking Status 39.77 kg/m2 Hysterectomy Former Smoker Vitals History BMI and BSA Data Body Mass Index Body Surface Area  
 39.77 kg/m 2 2.23 m 2 Preferred Pharmacy Pharmacy Name Phone [facility]bel Helms [address] [phone] Your Updated Medication List  
  
   
This list is accurate as of: 11/27/17  8:54 AM.  Always use your most recent med list.  
  
  
  
  
 aspirin delayed-release 81 mg tablet Take 1 Tab by mouth daily. Blood-Glucose Meter monitoring kit Free style lite glucose meter. Test blood sugar 2 times a day. Dx: E11.65 COD LIVER OIL 4,000 unit-400 unit/5 mL Liqd Generic drug:  vit A and D3 in cod liver oil Take  by mouth. 1 tablespoon daily  
  
 glucose blood VI test strips strip Commonly known as:  blood glucose test  
Freestyle lite test strips. Test 2 times a day. Dx: E11.9  
  
 insulin aspart 100 unit/mL Inpn Commonly known as:  NOVOLOG  
0.1 mL by SubCUTAneous route Before breakfast, lunch, and dinner. insulin glargine 100 unit/mL (3 mL) Inpn Commonly known as:  LANTUS SOLOSTAR  
45 units in the morning and 30 units at night Insulin Needles (Disposable) 31 gauge x 3/16\" Ndle Commonly known as:  BD INSULIN PEN NEEDLE UF MINI Use with Lantus Insulin injections twice a day Lancets Misc Lancets for Free Style lite glucose meter. Test blood sugar 2 times a day. Dx; E11.65  
  
 methylPREDNISolone 4 mg tablet Commonly known as:  Wil Elvin Take as directed per package instructions NIFEdipine ER 30 mg ER tablet Commonly known as:  ADALAT CC Take 2 Tabs by mouth daily. OTHER Ketamine 10%/Gabapentin 6%/ Baclofen 2%/ Cyclobenzaprine 2%/ Lidocaine 2%/ Flurbiprofen 10% Apply 1-2 Grams to the affected area 3-4 Times per day. QTY-180 GM REFILLS-5  
  
 rosuvastatin 5 mg tablet Commonly known as:  CRESTOR Take 1 Tab by mouth nightly. SITagliptin 100 mg tablet Commonly known as:  Atlee Lavender Take 1 Tab by mouth daily. Prescriptions Sent to Pharmacy Refills  
 methylPREDNISolone (MEDROL DOSEPACK) 4 mg tablet 0 Sig: Take as directed per package instructions Class: Normal  
 Pharmacy: [facility]IEUDONNE TORIBIO [address] [place] [address] Ph #: [phone] Follow-up Instructions Return in about 2 weeks (around 12/12/2017) for 1:30 pm.  
  
  
Patient Instructions Please contact our office if you have any questions about your visit today. Angioedema: Care Instructions Your Care Instructions Angioedema is an allergic reaction. It causes swelling and welts in the deep layers of the skin. Angioedema can sometimes occur along with hives. Hives are an allergic reaction in the outer layers of the skin. Angioedema can range from mild to severe. Painful welts can develop on the face. Angioedema can also occur on other parts of the body. In severe cases, the inside of the throat can swell and make it hard to breathe. Many things can cause this condition, including foods, insect bites, and medicines (such as aspirin and some blood pressure medicines). It also can run in families. Sometimes you may know what caused the reaction, but other times you may not know. Follow-up care is a key part of your treatment and safety. Be sure to make and go to all appointments, and call your doctor if you are having problems. It's also a good idea to know your test results and keep a list of the medicines you take. How can you care for yourself at home? · Take your medicines exactly as prescribed.  Call your doctor if you think you are having a problem with your medicine. You will get more details on the specific medicines your doctor prescribes. Some medicines used to treat angioedema can make you too sleepy to drive safely. Do not drive if you take medicine that may make you sleepy. · Avoid foods or medicine that may have triggered the swelling. · For comfort: ¨ Try taking a cool bath. Or place a cool, wet towel on the swollen area. ¨ Avoid hot baths and showers. ¨ Wear loose clothing. · Your doctor may prescribe a shot of epinephrine to carry with you in case you have a severe reaction. Learn how to give yourself the shot and keep it with you at all times. Make sure it has not . When should you call for help? Give an epinephrine shot if: 
? · You think you are having a severe allergic reaction. ? After giving an epinephrine shot call 911, even if you feel better. ?Call 911 if: 
? · You have symptoms of a severe allergic reaction. These may include: 
¨ Sudden raised, red areas (hives) all over your body. ¨ Swelling of the throat, mouth, lips, or tongue. ¨ Trouble breathing. ¨ Passing out (losing consciousness). Or you may feel very lightheaded or suddenly feel weak, confused, or restless. ? · You have been given an epinephrine shot, even if you feel better. ?Call your doctor now or seek immediate medical care if: 
? · You have symptoms of an allergic reaction, such as: ¨ A rash or hives (raised, red areas on the skin). ¨ Itching. ¨ Swelling. ¨ Belly pain, nausea, or vomiting. ? Watch closely for changes in your health, and be sure to contact your doctor if: 
? · You do not get better as expected. Where can you learn more? Go to http://afshan-nikki.info/. Enter Q175 in the search box to learn more about \"Angioedema: Care Instructions. \" Current as of: 2016 Content Version: 11.4 © 6093-5965 Healthwise, Incorporated.  Care instructions adapted under license by [facility] [address] (which disclaims liability or warranty for this information). If you have questions about a medical condition or this instruction, always ask your healthcare professional. [address] any warranty or liability for your use of this information. Hives: Care Instructions Your Care Instructions Hives are raised, red, itchy patches of skin. They are also called wheals or welts. They usually have red borders and pale centers. Hives range in size from ¼ inch to 3 inches or more across. They may seem to move from place to place on the skin. Several hives may form a large area of raised, red skin. You can get hives after an insect sting, after taking medicine or eating certain foods, or because of infection or stress. Other causes include plants, things you breathe in, makeup, heat, cold, sunlight, and latex. You cannot spread hives to other people. Hives may last a few minutes or a few days, but a single spot may last less than 36 hours. Follow-up care is a key part of your treatment and safety. Be sure to make and go to all appointments, and call your doctor if you are having problems. It's also a good idea to know your test results and keep a list of the medicines you take. How can you care for yourself at home? · Avoid whatever you think may have caused your hives, such as a certain food or medicine. However, you may not know the cause. · Put a cool, wet towel on the area to relieve itching. · Take an over-the-counter antihistamine, such as diphenhydramine (Benadryl), cetirizine (Zyrtec), or loratadine (Claritin), to help stop the hives and calm the itching. Read and follow directions on the label. These medicines can make you feel sleepy. Do not drive while using them. · Stay away from strong soaps, detergents, and chemicals. These can make itching worse. When should you call for help? Call 911 anytime you think you may need emergency care. For example, call if: 
? · You have symptoms of a severe allergic reaction. These may include: 
¨ Sudden raised, red areas (hives) all over your body. ¨ Swelling of the throat, mouth, lips, or tongue. ¨ Trouble breathing. ¨ Passing out (losing consciousness). Or you may feel very lightheaded or suddenly feel weak, confused, or restless. ?Call your doctor now or seek immediate medical care if: 
? · You have symptoms of an allergic reaction, such as: ¨ A rash or hives (raised, red areas on the skin). ¨ Itching. ¨ Swelling. ¨ Belly pain, nausea, or vomiting. ? · You get hives after you start a new medicine. ? · Hives have not gone away after 24 hours. ? Watch closely for changes in your health, and be sure to contact your doctor if: 
? · You do not get better as expected. Where can you learn more? Go to http://afshan-nikki.info/. Enter Q692 in the search box to learn more about \"Hives: Care Instructions. \" Current as of: March 20, 2017 Content Version: 11.4 © 9248-4791 [employer]. Care instructions adapted under license by [employer] (which disclaims liability or warranty for this information). If you have questions about a medical condition or this instruction, always ask your healthcare professional. [place] [address] any warranty or liability for your use of this information. Please provide this summary of care documentation to your next provider. Your primary care clinician is listed as BRENNA WEAVER. If you have any questions after today's visit, please call [phone].

## 2017-12-11 NOTE — TELEPHONE ENCOUNTER
Pt needs a refill on    Requested Prescriptions     Pending Prescriptions Disp Refills    insulin aspart (NOVOLOG) 100 unit/mL inpn 5 Pen 6     Sig: 10 Units by SubCUTAneous route Before breakfast, lunch, and dinner. Alondra MARTINEZ  Pt is out of medication.

## 2017-12-12 RX ORDER — INSULIN ASPART 100 [IU]/ML
10 INJECTION, SOLUTION INTRAVENOUS; SUBCUTANEOUS
Qty: 5 PEN | Refills: 0 | Status: SHIPPED | OUTPATIENT
Start: 2017-12-12

## 2017-12-13 ENCOUNTER — OFFICE VISIT (OUTPATIENT)
Dept: NEUROLOGY | Age: 72
End: 2017-12-13

## 2017-12-13 VITALS
DIASTOLIC BLOOD PRESSURE: 68 MMHG | TEMPERATURE: 98.8 F | HEIGHT: 65 IN | HEART RATE: 94 BPM | RESPIRATION RATE: 16 BRPM | BODY MASS INDEX: 39.82 KG/M2 | OXYGEN SATURATION: 98 % | WEIGHT: 239 LBS | SYSTOLIC BLOOD PRESSURE: 138 MMHG

## 2017-12-13 DIAGNOSIS — R41.89 DISTURBED COGNITION: ICD-10-CM

## 2017-12-13 DIAGNOSIS — R41.3 MEMORY LOSS: Primary | ICD-10-CM

## 2017-12-13 RX ORDER — INSULIN GLARGINE 100 [IU]/ML
INJECTION, SOLUTION SUBCUTANEOUS
Qty: 3 PEN | Refills: 0 | Status: SHIPPED | OUTPATIENT
Start: 2017-12-13 | End: 2018-02-01 | Stop reason: SDUPTHER

## 2017-12-13 NOTE — PROGRESS NOTES
[facility]             [address] [place], [address]    12/13/2017    HPI:  Pascale Jackson is a 74 y.o., right handed,   female, who presents with intermittent paresthesias as well as memory loss. She reports that suddenly started 6 months ago and her dorsal surface of the right arm lasting for minutes at a time without   several months later she began noting the same difficulty and the right lateral leg. Episodes occur independently are not associated with neck pain or headache. She does also admit to memory loss which started 3-4 years ago occasional depression she describes breast pain is 11 or 7/10 she notes the tingling can vary in intensity from 7-10 pain scale. She is aware of no history of strokes or head trauma. Patient returns  In follow up continues to note some difficulty at times with memory specially in trying to recall names or dates. She reports one episode where she failed to recognize her daughter at the hairdresser. Patient denies any further episodes. She reports she is able to recall things after time. She admits to some time daytime sleepiness and fatigue but admits to occasional naps especially when stressed. [place] sleepiness score is only 4 out of 24  Current Outpatient Prescriptions   Medication Sig Dispense Refill    insulin aspart (NOVOLOG) 100 unit/mL inpn 10 Units by SubCUTAneous route Before breakfast, lunch, and dinner. 5 Pen 0    methylPREDNISolone (MEDROL DOSEPACK) 4 mg tablet Take as directed per package instructions 1 Dose Pack 0    rosuvastatin (CRESTOR) 5 mg tablet Take 1 Tab by mouth nightly. 90 Tab 3    NIFEdipine ER (ADALAT CC) 30 mg ER tablet Take 2 Tabs by mouth daily.  180 Tab 3    Insulin Needles, Disposable, (BD INSULIN PEN NEEDLE UF MINI) 31 gauge x 3/16\" ndle Use with Lantus Insulin injections twice a day 200 Pen Needle 3    aspirin delayed-release 81 mg tablet Take 1 Tab by mouth daily. 90 Tab 3    glucose blood VI test strips (BLOOD GLUCOSE TEST) strip Freestyle lite test strips. Test 2 times a day. Dx: E11.9 200 Strip 3    Blood-Glucose Meter monitoring kit Free style lite glucose meter. Test blood sugar 2 times a day. Dx: E11.65 1 Kit 0    Lancets misc Lancets for Free Style lite glucose meter. Test blood sugar 2 times a day. Dx; E11.65 200 Each 3    OTHER Ketamine 10%/Gabapentin 6%/ Baclofen 2%/ Cyclobenzaprine 2%/ Lidocaine 2%/ Flurbiprofen 10%  Apply 1-2 Grams to the affected area 3-4 Times per day. QTY-180 GM  REFILLS-5      vit A & D3 in cod liver oil (COD LIVER OIL) 4,000 unit-400 unit/5 mL Liqd Take  by mouth. 1 tablespoon daily      insulin glargine (LANTUS SOLOSTAR) 100 unit/mL (3 mL) inpn 60 units in the morning and 45 units at night 3 Pen 0    SITagliptin (JANUVIA) 100 mg tablet Take 1 Tab by mouth daily. 80 Tab 3       Past Medical History:   Diagnosis Date    Anemia NEC     BMI 39.0-39.9,adult 5/15/2017    Chest pain, atypical     CKD (chronic kidney disease)     Constipation     Diabetes ([facility] [address]     Ectopic pregnancy     History of Holter monitoring 10/14/2013    Baseline sinus rhythm w/extremely infrequent PVCs, which are likely cause of patient's symptoms.  Hyperlipidemia     Hypertension     Normal stress echo 09/24/2010    Normal study after maximal exercise. No symptoms. Ex time 6 min. EF 60%.          Past Surgical History:   Procedure Laterality Date    COLONOSCOPY N/A 9/15/2016    COLONOSCOPYwith polypectomies performed by [facility] at [facility] ENDOSCOPY    HX HYSTERECTOMY       Family History   Problem Relation Age of Onset    Heart Attack Mother     Other Mother      CAD    Diabetes Mother     Diabetes Sister     HIV/AIDS Brother     Hypertension Sister     Stroke Sister     Diabetes Sister     Seizures Sister     Heart Failure Brother      Allergies   Allergen Reactions    Allegra-D 12 Hour [Fexofenadine-Pseudoephedrine] Shortness of Breath    Zocor [Simvastatin] Other (comments)     Intolerance, muscle ache       Review of Systems:   Review of Systems - History obtained from the patient  General ROS: positive for  - fatigue  Psychological ROS: positive for - depression  ENT ROS: negative  Hematological and Lymphatic ROS: negative  Endocrine ROS: negative  Respiratory ROS: no cough, shortness of breath, or wheezing  Cardiovascular ROS: no chest pain or dyspnea on exertion  Gastrointestinal ROS: no abdominal pain, change in bowel habits, or black or bloody stools  Genito-Urinary ROS: no dysuria, trouble voiding, or hematuria  Musculoskeletal ROS: positive for - joint pain and pain in back - lower and neck - lower  Neurological ROS: positive for - memory loss and numbness/tingling  Dermatological ROS: negative    PHYSICAL EXAMINATION:    Visit Vitals    /68 (BP 1 Location: Left arm, BP Patient Position: Sitting)    Pulse 94    Temp 98.8 °F (37.1 °C) (Oral)    Resp 16    Ht 5' 5\" (1.651 m)    Wt 108.4 kg (239 lb)    SpO2 98%    BMI 39.77 kg/m2     General:  Well defined, nourished, and groomed individual in no acute distress. Neck: Supple, nontender, thyroid within normal limits, no JVD, no bruits, no pain with resistance to active range of motion. Heart: Regular rate and rhythm, no murmurs, rub, or gallop. Normal S1S2    Psych:  Good mood and normal affect    NEUROLOGICAL EXAMINATION:     Mental Status:   Alert and oriented to person, place, and time with recent and remote memory intact. Attention span and concentration are normal. Speech is fluent with a full fund of knowledge. stm3/3    Cranial Nerves:    II, III, IV, VI:  Visual acuity grossly intact. Visual fields are normal.    Pupils are equal, round, and reactive to light and accommodation. Extra-ocular movements are full and fluid. no ptosis or nystagmus. V-XII: Hearing is grossly intact.   Facial features are symmetric    Motor Examination: Normal tone, bulk, and strength, 5/5 muscle strength throughout. No cogwheel rigidity or clonus present. Coordination: No resting or intention tremor    Gait and Station:  Steady while walking     Normal arm swing. No muscle wasting or fasiculations noted. Personally reviewed mri imaging as minimal small vessel disease,eeg normal    Assessment and Plan:   Terri Gutierrez is a 74 y.o. right handed female whose history and physical are consistent with   memory loss. Terri Gutierrez who has risk factors including hypertension,diabetes,hyperlidpidemia    Diagnoses and all orders for this visit:    1. Memory loss    2. Disturbed cognition      Follow-up Disposition:  Return in about 6 months (around 6/13/2018). Reviewed notes and lab in chart as well as work up accomplished . Discussed starting medication for memory loss patient deferred  Reviewed need for  Vit b12  Supplementation,control blood pressure and diabetes reviewed possibility of other testing including sleep studies are neuropsychological testing patient defers  I spent 30  minutes with the patient in face-to-face consultation, of which greater than 50% was spent in counseling and coordination of care as described above.

## 2017-12-13 NOTE — MR AVS SNAPSHOT
Visit Information Date & Time Provider Department Dept. Phone Encounter #  
 12/13/2017  1:45 PM Pat Lawson MD [address] at [address] [facility] [id number] Follow-up Instructions Return in about 6 months (around 6/13/2018). Follow-up and Disposition History Your Appointments 1/24/2018  8:30 AM  
Follow Up with Lisa Villavicencio MD  
[facility] (--) Appt Note: fu  
 [address] [id number] [address] [address] [phone]  
  
   
 [address] [address] [address] [address] [address] 6/13/2018  8:45 AM  
Follow Up with Pat Lawson MD  
[address] at [address] [facility]) Appt Note: 6 month fu  
 [address] [address] [address] [facility] 630 [facility] [address] 200 [facility] Upcoming Health Maintenance Date Due DTaP/Tdap/Td series (1 - Tdap) 1/21/1966 GLAUCOMA SCREENING Q2Y 11/25/2015 EYE EXAM RETINAL OR DILATED Q1 4/20/2016 LIPID PANEL Q1 1/27/2018 MICROALBUMIN Q1 1/28/2018 HEMOGLOBIN A1C Q6M 2/17/2018 Pneumococcal 65+ Low/Medium Risk (2 of 2 - PPSV23) 3/8/2018 MEDICARE YEARLY EXAM 3/9/2018 FOOT EXAM Q1 10/19/2018 COLONOSCOPY 9/20/2019 Allergies as of 12/13/2017  Review Complete On: 12/13/2017 By: Pat Lawson MD  
  
 Severity Noted Reaction Type Reactions Allegra-d 12 Hour [Fexofenadine-pseudoephedrine]  09/23/2011    Shortness of Breath Zocor [Simvastatin]  06/11/2010    Other (comments) Intolerance, muscle ache Current Immunizations  Reviewed on 3/8/2017 No immunizations on file. Not reviewed this visit You Were Diagnosed With   
  
 Codes Comments Memory loss    -  Primary ICD-10-CM: R41.3 ICD-9-CM: 780.93 Disturbed cognition     ICD-10-CM: R41.89 ICD-9-CM: 294.9 Vitals BP Pulse Temp Resp Height(growth percentile) Weight(growth percentile) 138/68 (BP 1 Location: Left arm, BP Patient Position: Sitting) 94 98.8 °F (37.1 °C) (Oral) 16 5' 5\" (1.651 m) 239 lb (108.4 kg) SpO2 BMI OB Status Smoking Status 98% 39.77 kg/m2 Hysterectomy Former Smoker Vitals History BMI and BSA Data Body Mass Index Body Surface Area  
 39.77 kg/m 2 2.23 m 2 Preferred Pharmacy Pharmacy Name Phone [facility], Marybel Barrios [address] [phone] Your Updated Medication List  
  
   
This list is accurate as of: 12/13/17  2:32 PM.  Always use your most recent med list.  
  
  
  
  
 aspirin delayed-release 81 mg tablet Take 1 Tab by mouth daily. Blood-Glucose Meter monitoring kit Free style lite glucose meter. Test blood sugar 2 times a day. Dx: E11.65 COD LIVER OIL 4,000 unit-400 unit/5 mL Liqd Generic drug:  vit A and D3 in cod liver oil Take  by mouth. 1 tablespoon daily  
  
 glucose blood VI test strips strip Commonly known as:  blood glucose test  
Freestyle lite test strips. Test 2 times a day. Dx: E11.9  
  
 insulin aspart 100 unit/mL Inpn Commonly known as:  NOVOLOG  
10 Units by SubCUTAneous route Before breakfast, lunch, and dinner. insulin glargine 100 unit/mL (3 mL) Inpn Commonly known as:  LANTUS SOLOSTAR  
60 units in the morning and 45 units at night Insulin Needles (Disposable) 31 gauge x 3/16\" Ndle Commonly known as:  BD INSULIN PEN NEEDLE UF MINI Use with Lantus Insulin injections twice a day Lancets Misc Lancets for Free Style lite glucose meter. Test blood sugar 2 times a day. Dx; E11.65  
  
 methylPREDNISolone 4 mg tablet Commonly known as:  Mundo Angst Take as directed per package instructions NIFEdipine ER 30 mg ER tablet Commonly known as:  ADALAT CC Take 2 Tabs by mouth daily. OTHER Ketamine 10%/Gabapentin 6%/ Baclofen 2%/ Cyclobenzaprine 2%/ Lidocaine 2%/ Flurbiprofen 10% Apply 1-2 Grams to the affected area 3-4 Times per day. QTY-180 GM REFILLS-5  
  
 rosuvastatin 5 mg tablet Commonly known as:  CRESTOR Take 1 Tab by mouth nightly. SITagliptin 100 mg tablet Commonly known as:  Xiao Bannister Take 1 Tab by mouth daily. Follow-up Instructions Return in about 6 months (around 6/13/2018). Introducing [facility] SERVICES! Dear Beata Castro: Thank you for requesting a [employer] account. Our records indicate that you have previously registered for a [employer] account but its currently inactive. Please call our [employer] support line at [phone]. Additional Information If you have questions, please visit the Frequently Asked Questions section of the [employer] website at [email or web address] Remember, [employer] is NOT to be used for urgent needs. For medical emergencies, dial 911. Now available from your iPhone and Android! Please provide this summary of care documentation to your next provider. Your primary care clinician is listed as BRENNA WEAVER. If you have any questions after today's visit, please call [phone].

## 2017-12-13 NOTE — PROGRESS NOTES
1. Have you been to the ER, urgent care clinic since your last visit? Hospitalized since your last visit? No    2. Have you seen or consulted any other health care providers outside of the [address] [place] [address] since your last visit? Include any pap smears or colon screening.  No     Chief Complaint   Patient presents with    Follow-up    Neurologic Problem     Disturbed skin sensation, memory loss

## 2017-12-13 NOTE — TELEPHONE ENCOUNTER
Pt called requesting refill for medication . Requested Prescriptions     Pending Prescriptions Disp Refills    insulin glargine (LANTUS SOLOSTAR) 100 unit/mL (3 mL) inpn       Si units in the morning and 30 units at night     Pt stated that she out of Lantus medication. Please advise.

## 2018-01-02 DIAGNOSIS — E11.9 DIABETES MELLITUS WITHOUT COMPLICATION (HCC): ICD-10-CM

## 2018-01-02 DIAGNOSIS — I10 ESSENTIAL HYPERTENSION: ICD-10-CM

## 2018-01-02 RX ORDER — ASPIRIN 81 MG/1
81 TABLET ORAL DAILY
Qty: 90 TAB | Refills: 0 | Status: SHIPPED | OUTPATIENT
Start: 2018-01-02 | End: 2018-05-17 | Stop reason: SDUPTHER

## 2018-01-02 NOTE — TELEPHONE ENCOUNTER
Requested Prescriptions     Pending Prescriptions Disp Refills    aspirin delayed-release 81 mg tablet 90 Tab 3     Sig: Take 1 Tab by mouth daily.

## 2018-02-01 NOTE — TELEPHONE ENCOUNTER
Requested Prescriptions     Pending Prescriptions Disp Refills    insulin glargine (LANTUS SOLOSTAR) 100 unit/mL (3 mL) inpn 3 Pen 0     Si units in the morning and 45 units at night    glucose blood VI test strips (BLOOD GLUCOSE TEST) strip 200 Strip 3     Sig: Freestyle lite test strips. Test 2 times a day.   Dx: E11.9       Test strips for precision xtra\"

## 2018-02-02 RX ORDER — INSULIN GLARGINE 100 [IU]/ML
INJECTION, SOLUTION SUBCUTANEOUS
Qty: 15 PEN | Refills: 0 | Status: SHIPPED | OUTPATIENT
Start: 2018-02-02 | End: 2018-03-28 | Stop reason: SDUPTHER

## 2018-02-06 NOTE — TELEPHONE ENCOUNTER
Pt called and stated that they gave ordered the wrong test strips for glucose meter. Pt stated that she needs is the precision extra test strips called to reynaldo MARTINEZ. Pt no longer uses free style because she lost her meter.

## 2018-02-12 ENCOUNTER — OFFICE VISIT (OUTPATIENT)
Dept: FAMILY MEDICINE CLINIC | Age: 73
End: 2018-02-12

## 2018-02-12 VITALS
SYSTOLIC BLOOD PRESSURE: 138 MMHG | RESPIRATION RATE: 16 BRPM | HEART RATE: 100 BPM | BODY MASS INDEX: 39.65 KG/M2 | TEMPERATURE: 98 F | DIASTOLIC BLOOD PRESSURE: 67 MMHG | WEIGHT: 238 LBS | HEIGHT: 65 IN

## 2018-02-12 DIAGNOSIS — I10 ESSENTIAL HYPERTENSION: ICD-10-CM

## 2018-02-12 DIAGNOSIS — E78.5 HYPERLIPIDEMIA, UNSPECIFIED HYPERLIPIDEMIA TYPE: ICD-10-CM

## 2018-02-12 DIAGNOSIS — E11.21 TYPE 2 DIABETES WITH NEPHROPATHY (HCC): Primary | ICD-10-CM

## 2018-02-12 DIAGNOSIS — T78.40XS ALLERGIC REACTION, SEQUELA: ICD-10-CM

## 2018-02-12 NOTE — PROGRESS NOTES
Shai Hwang 74 y.o. female   Chief Complaint   Patient presents with    Follow-up    Hypertension    Diabetes         1. Have you been to the ER, urgent care clinic since your last visit? Hospitalized since your last visit? No    2. Have you seen or consulted any other health care providers outside of the [address] [place] [address] since your last visit? Include any pap smears or colon screening.  No

## 2018-02-12 NOTE — PROGRESS NOTES
HISTORY OF PRESENT ILLNESS  Amor Cortez is a 74 y.o. female. Chief Complaint   Patient presents with    Follow-up    Hypertension    Diabetes       HPI  Patient is here for a  follow up of Lipid, htn, and DM. Pt is feeling well overall. She has been trying to avoid nuts as she thinks that is what she had reacted to previously. Pt was not testing blood sugars for a while as she had lost her meter. She has replaced it now. Recent readings are in the 100-120 range. Patient does not need medication refills today. New concerns today: None      ROS  Review of Systems   Constitutional: Negative. HENT: Negative. Respiratory: Negative. Cardiovascular: Negative. All other systems reviewed and are negative. Physical Exam  Physical Exam   Nursing note and vitals reviewed. Constitutional: She is oriented to person, place, and time. She appears well-developed and well-nourished. HENT:   Head: Normocephalic and atraumatic. Right Ear: External ear normal.   Left Ear: External ear normal.   Nose: Nose normal.   Eyes: Conjunctivae and EOM are normal.   Neck: Normal range of motion. Neck supple. No JVD present. Carotid bruit is not present. No thyromegaly present. Cardiovascular: Normal rate, regular rhythm, normal heart sounds and intact distal pulses. Exam reveals no gallop and no friction rub. No murmur heard. Pulmonary/Chest: Effort normal and breath sounds normal. She has no wheezes. She has no rhonchi. She has no rales. Abdominal: Soft. Bowel sounds are normal.   Musculoskeletal: Normal range of motion. Neurological: She is alert and oriented to person, place, and time. Coordination normal.   Skin: Skin is warm and dry. Psychiatric: She has a normal mood and affect. Her behavior is normal. Judgment and thought content normal.     ASSESSMENT and PLAN  Diagnoses and all orders for this visit:    1.  Type 2 diabetes with nephropathy (HCC)  -     METABOLIC PANEL, COMPREHENSIVE; Future  -     LIPID PANEL; Future  -     HEMOGLOBIN A1C WITH EAG; Future  Stable, cont pres tx plan. 2. Hyperlipidemia, unspecified hyperlipidemia type  -     METABOLIC PANEL, COMPREHENSIVE; Future  -     LIPID PANEL; Future    3. Essential hypertension  -     METABOLIC PANEL, COMPREHENSIVE; Future  -     LIPID PANEL; Future  Stable, cont pres tx plan. 4. Allergic reaction, sequela  -     MISC.  LAB TEST; Future      Follow-up Disposition: 3 months; sooner prn

## 2018-02-15 ENCOUNTER — HOSPITAL ENCOUNTER (EMERGENCY)
Age: 73
Discharge: HOME | End: 2018-02-15
Attending: EMERGENCY MEDICINE | Admitting: EMERGENCY MEDICINE
Payer: MEDICARE

## 2018-02-15 VITALS
TEMPERATURE: 99.3 F | HEIGHT: 63 IN | OXYGEN SATURATION: 98 % | BODY MASS INDEX: 42.17 KG/M2 | SYSTOLIC BLOOD PRESSURE: 146 MMHG | WEIGHT: 238 LBS | DIASTOLIC BLOOD PRESSURE: 76 MMHG | HEART RATE: 91 BPM | RESPIRATION RATE: 16 BRPM

## 2018-02-15 DIAGNOSIS — S46.911A SHOULDER STRAIN, RIGHT, INITIAL ENCOUNTER: ICD-10-CM
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
## 2018-02-15 PROCEDURE — 99283 EMERGENCY DEPT VISIT LOW MDM: CPT

## 2018-02-15 PROCEDURE — 74011250637 HC RX REV CODE- 250/637: Performed by: EMERGENCY MEDICINE

## 2018-02-15 RX ORDER — OXYCODONE AND ACETAMINOPHEN 5; 325 MG/1; MG/1
1 TABLET ORAL
Status: COMPLETED | OUTPATIENT
Start: 2018-02-15 | End: 2018-02-15

## 2018-02-15 RX ORDER — OXYCODONE AND ACETAMINOPHEN 5; 325 MG/1; MG/1
TABLET ORAL
Qty: 12 TAB | Refills: 0 | Status: SHIPPED | OUTPATIENT
Start: 2018-02-15 | End: 2022-03-25

## 2018-02-15 RX ADMIN — OXYCODONE HYDROCHLORIDE AND ACETAMINOPHEN 1 TABLET: 5; 325 TABLET ORAL at 19:30

## 2018-02-16 ENCOUNTER — PATIENT OUTREACH (OUTPATIENT)
Dept: FAMILY MEDICINE CLINIC | Age: 73
End: 2018-02-16
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
## 2018-02-19 ENCOUNTER — PATIENT OUTREACH (OUTPATIENT)
Dept: FAMILY MEDICINE CLINIC | Age: 73
End: 2018-02-19

## 2018-02-19 NOTE — LETTER
2/19/2018 12:58 PM 
 
Ms. Airam Hwang 
[address] [facility] [address] [place] [place] [address] I am a Nurse Navigator working with your physician's office. Part of my job is to follow up with patients who have been in the emergency department or hospital to see how they are feeling, answer any questions they may have about their visit and also make sure they have a follow-up appointment to see their primary care doctor. I can also provide resources to patients that have other needs. Please call me if you need assistance with affording food, medications, or if you need transportation to physician appointments. I can be reached Monday thru Friday at the telephone number listed above. Thank you for allowing us to participate in your care!  
 
 
 
 
Sincerely, 
 
 
Laura Pérez RN
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
## 2018-02-22 ENCOUNTER — OFFICE VISIT (OUTPATIENT)
Dept: FAMILY MEDICINE CLINIC | Age: 73
End: 2018-02-22

## 2018-02-22 VITALS
WEIGHT: 231 LBS | RESPIRATION RATE: 17 BRPM | SYSTOLIC BLOOD PRESSURE: 127 MMHG | BODY MASS INDEX: 40.93 KG/M2 | OXYGEN SATURATION: 99 % | HEART RATE: 92 BPM | DIASTOLIC BLOOD PRESSURE: 79 MMHG | HEIGHT: 63 IN | TEMPERATURE: 98 F
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
## 2018-02-22 DIAGNOSIS — M54.2 NECK PAIN: ICD-10-CM

## 2018-02-22 RX ORDER — DICLOFENAC SODIUM 10 MG/G
GEL TOPICAL 4 TIMES DAILY
Qty: 100 G | Refills: 1 | Status: SHIPPED | OUTPATIENT
Start: 2018-02-22 | End: 2022-07-13 | Stop reason: SDUPTHER

## 2018-02-22 RX ORDER — CYCLOBENZAPRINE HCL 5 MG
5 TABLET ORAL
Qty: 30 TAB | Refills: 0 | Status: SHIPPED | OUTPATIENT
Start: 2018-02-22 | End: 2022-03-25

## 2018-02-22 RX ORDER — IBUPROFEN 800 MG/1
800 TABLET ORAL
Qty: 30 TAB | Refills: 1 | Status: SHIPPED | OUTPATIENT
Start: 2018-02-22
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
## 2018-02-22 NOTE — PATIENT INSTRUCTIONS
Please contact our office if you have any questions about your visit today. 1.) Please get x-ray of neck as soon as possible. 2.) Use diclofenac cream as needed. 3.) Use Cyclobenzaprine and Ibuprofen as needed. 4.) Please keep may appointment in May. Learning About How to Have a Healthy Back  What causes back pain? Back pain is often caused by overuse, strain, or injury. For example, people often hurt their backs playing sports or working in the yard, being jolted in a car accident, or lifting something too heavy. Aging plays a part too. Your bones and muscles tend to lose strength as you age, which makes injury more likely. The spongy discs between the bones of the spine (vertebrae) may suffer from wear and tear and no longer provide enough cushion between the bones. A disc that bulges or breaks open (herniated disc) can press on nerves, causing back pain. In some people, back pain is the result of arthritis, broken vertebrae caused by bone loss (osteoporosis), illness, or a spine problem. Although most people have back pain at one time or another, there are steps you can take to make it less likely. How can you have a healthy back? Reduce stress on your back through good posture  Slumping or slouching alone may not cause low back pain. But after the back has been strained or injured, bad posture can make pain worse. · Sleep in a position that maintains your back's normal curves and on a mattress that feels comfortable. Sleep on your side with a pillow between your knees, or sleep on your back with a pillow under your knees. These positions can reduce strain on your back. · Stand and sit up straight. \"Good posture\" generally means your ears, shoulders, and hips are in a straight line. · If you must stand for a long time, put one foot on a stool, ledge, or box. Switch feet every now and then.   · Sit in a chair that is low enough to let you place both feet flat on the floor with both knees nearly level with your hips. If your chair or desk is too high, use a footrest to raise your knees. Place a small pillow, a rolled-up towel, or a lumbar roll in the curve of your back if you need extra support. · Try a kneeling chair, which helps tilt your hips forward. This takes pressure off your lower back. · Try sitting on an exercise ball. It can rock from side to side, which helps keep your back loose. · When driving, keep your knees nearly level with your hips. Sit straight, and drive with both hands on the steering wheel. Your arms should be in a slightly bent position. Reduce stress on your back through careful lifting  · Squat down, bending at the hips and knees only. If you need to, put one knee to the floor and extend your other knee in front of you, bent at a right angle (half kneeling). · Press your chest straight forward. This helps keep your upper back straight while keeping a slight arch in your low back. · Hold the load as close to your body as possible, at the level of your belly button (navel). · Use your feet to change direction, taking small steps. · Lead with your hips as you change direction. Keep your shoulders in line with your hips as you move. · Set down your load carefully, squatting with your knees and hips only. Exercise and stretch your back  · Do some exercise on most days of the week, if your doctor says it is okay. You can walk, run, swim, or cycle. · Stretch your back muscles. Here are a few exercises to try:  Tyra Pert on your back, and gently pull one bent knee to your chest. Put that foot back on the floor, and then pull the other knee to your chest.  ¨ Do pelvic tilts. Lie on your back with your knees bent. Tighten your stomach muscles. Pull your belly button (navel) in and up toward your ribs. You should feel like your back is pressing to the floor and your hips and pelvis are slightly lifting off the floor.  Hold for 6 seconds while breathing smoothly. ¨ Sit with your back flat against a wall. · Keep your core muscles strong. The muscles of your back, belly (abdomen), and buttocks support your spine. ¨ Pull in your belly and imagine pulling your navel toward your spine. Hold this for 6 seconds, then relax. Remember to keep breathing normally as you tense your muscles. ¨ Do curl-ups. Always do them with your knees bent. Keep your low back on the floor, and curl your shoulders toward your knees using a smooth, slow motion. Keep your arms folded across your chest. If this bothers your neck, try putting your hands behind your neck (not your head), with your elbows spread apart. ¨ Lie on your back with your knees bent and your feet flat on the floor. Tighten your belly muscles, and then push with your feet and raise your buttocks up a few inches. Hold this position 6 seconds as you continue to breathe normally, then lower yourself slowly to the floor. Repeat 8 to 12 times. ¨ If you like group exercise, try Pilates or yoga. These classes have poses that strengthen the core muscles. Lead a healthy lifestyle  · Stay at a healthy weight to avoid strain on your back. · Do not smoke. Smoking increases the risk of osteoporosis, which weakens the spine. If you need help quitting, talk to your doctor about stop-smoking programs and medicines. These can increase your chances of quitting for good. Where can you learn more? Go to http://afshan-nikki.info/. Enter L315 in the search box to learn more about \"Learning About How to Have a Healthy Back. \"  Current as of: March 21, 2017  Content Version: 11.4  © 7980-7175 Healthwise, Incorporated. Care instructions adapted under license by [employer] (which disclaims liability or warranty for this information).  If you have questions about a medical condition or this instruction, always ask your healthcare professional. [address] any warranty or liability for your use of this information. Back Stretches: Exercises  Your Care Instructions  Here are some examples of exercises for stretching your back. Start each exercise slowly. Ease off the exercise if you start to have pain. Your doctor or physical therapist will tell you when you can start these exercises and which ones will work best for you. How to do the exercises  Overhead stretch    1. Stand comfortably with your feet shoulder-width apart. 2. Looking straight ahead, raise both arms over your head and reach toward the ceiling. Do not allow your head to tilt back. 3. Hold for 15 to 30 seconds, then lower your arms to your sides. 4. Repeat 2 to 4 times. Side stretch    1. Stand comfortably with your feet shoulder-width apart. 2. Raise one arm over your head, and then lean to the other side. 3. Slide your hand down your leg as you let the weight of your arm gently stretch your side muscles. Hold for 15 to 30 seconds. 4. Repeat 2 to 4 times on each side. Press-up    1. Lie on your stomach, supporting your body with your forearms. 2. Press your elbows down into the floor to raise your upper back. As you do this, relax your stomach muscles and allow your back to arch without using your back muscles. As your press up, do not let your hips or pelvis come off the floor. 3. Hold for 15 to 30 seconds, then relax. 4. Repeat 2 to 4 times. Relax and rest    1. Lie on your back with a rolled towel under your neck and a pillow under your knees. Extend your arms comfortably to your sides. 2. Relax and breathe normally. 3. Remain in this position for about 10 minutes. 4. If you can, do this 2 or 3 times each day. Follow-up care is a key part of your treatment and safety. Be sure to make and go to all appointments, and call your doctor if you are having problems. It's also a good idea to know your test results and keep a list of the medicines you take. Where can you learn more?   Go to http://afshan-nikki.info/. Enter Q017 in the search box to learn more about \"Back Stretches: Exercises. \"  Current as of: March 21, 2017  Content Version: 11.4  © 2240-1154 Healthwise, Incorporated. Care instructions adapted under license by [employer] (which disclaims liability or warranty for this information). If you have questions about a medical condition or this instruction, always ask your healthcare professional. [place] [address] any warranty or liability for your use of this information.
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
## 2018-02-28 ENCOUNTER — HOSPITAL ENCOUNTER (OUTPATIENT)
Dept: GENERAL RADIOLOGY | Age: 73
Discharge: HOME | End: 2018-02-28
Payer: MEDICARE
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
## 2018-02-28 DIAGNOSIS — M54.2 NECK PAIN: ICD-10-CM

## 2018-02-28 PROCEDURE — 72050 X-RAY EXAM NECK SPINE 4/5VWS: CPT

## 2018-03-20 ENCOUNTER — PATIENT OUTREACH (OUTPATIENT)
Dept: FAMILY MEDICINE CLINIC | Age: 73
End: 2018-03-20

## 2018-03-20 NOTE — PROGRESS NOTES
Goals Addressed             Most Recent     COMPLETED: Establish PCP relationships and regularly scheduled appointments.    On track (3/20/2018)             Follow up with PCP  Appointment completed on 2/22/2018       COMPLETED: Reduce ED Utilization   On track (3/20/2018)             No admissions within 30 days post discharge, 2/15/2018  Episode closed: no hospitalization or ED admission post 30 days from discharge

## 2018-03-28 NOTE — TELEPHONE ENCOUNTER
Pt called requesting refill for medication .   Requested Prescriptions     Pending Prescriptions Disp Refills    insulin glargine (LANTUS SOLOSTAR U-100 INSULIN) 100 unit/mL (3 mL) inpn 15 Pen 0     Si units in the morning and 45 units at night

## 2018-03-30 RX ORDER — INSULIN GLARGINE 100 [IU]/ML
INJECTION, SOLUTION SUBCUTANEOUS
Qty: 20 PEN | Refills: 1 | Status: SHIPPED | OUTPATIENT
Start: 2018-03-30

## 2018-03-30 NOTE — TELEPHONE ENCOUNTER
Patient states yesterday she checked her BG (reading was between 104 or 124, she don't remember which). She took her 60 units of Lantus as directed and then 10 units of her Novolog and ate breakfast. BG dropped later that day. She ate sweets to raise level. Felt bad the rest of the day. Want to know if insulin need to be adjusted.

## 2018-04-02 ENCOUNTER — DOCUMENTATION ONLY (OUTPATIENT)
Dept: FAMILY MEDICINE CLINIC | Age: 73
End: 2018-04-02

## 2018-04-02 NOTE — PROGRESS NOTES
Patient informed of adjustments of Lantus and Novolog per Dr Chuyita Jaeger. Told to monitor BG and to schedule appt if BG is still low.

## 2018-05-17 ENCOUNTER — OFFICE VISIT (OUTPATIENT)
Dept: FAMILY MEDICINE CLINIC | Age: 73
End: 2018-05-17

## 2018-05-17 VITALS
TEMPERATURE: 97.5 F | HEIGHT: 62 IN | BODY MASS INDEX: 43.24 KG/M2 | HEART RATE: 92 BPM | OXYGEN SATURATION: 97 % | DIASTOLIC BLOOD PRESSURE: 79 MMHG | RESPIRATION RATE: 16 BRPM | WEIGHT: 235 LBS | SYSTOLIC BLOOD PRESSURE: 141 MMHG

## 2018-05-17 DIAGNOSIS — Z12.39 SCREENING FOR BREAST CANCER: ICD-10-CM

## 2018-05-17 DIAGNOSIS — E11.9 DIABETES MELLITUS WITHOUT COMPLICATION (HCC): Primary | ICD-10-CM

## 2018-05-17 DIAGNOSIS — I10 ESSENTIAL HYPERTENSION: ICD-10-CM

## 2018-05-17 PROBLEM — E66.01 OBESITY, MORBID (HCC): Status: ACTIVE | Noted: 2018-05-17

## 2018-05-17 RX ORDER — ASPIRIN 81 MG/1
81 TABLET ORAL DAILY
Qty: 90 TAB | Refills: 0 | Status: SHIPPED | OUTPATIENT
Start: 2018-05-17

## 2018-05-17 NOTE — PROGRESS NOTES
Chief Complaint   Patient presents with    Referral Request     Patient states that she needs two referrals mammogram and colonoscopy. 1. Have you been to the ER, urgent care clinic since your last visit? Hospitalized since your last visit? No    2. Have you seen or consulted any other health care providers outside of the [address] [place] [address] since your last visit? Include any pap smears or colon screening.  No     Health Maintenance Due   Topic Date Due    DTaP/Tdap/Td series (1 - Tdap) 01/21/1964    LIPID PANEL Q1  01/27/2018    MICROALBUMIN Q1  01/28/2018    HEMOGLOBIN A1C Q6M  02/17/2018    Pneumococcal 65+ Low/Medium Risk (2 of 2 - PPSV23) 03/08/2018    MEDICARE YEARLY EXAM  03/14/2018

## 2018-05-17 NOTE — PROGRESS NOTES
HPI  Ivory Steen is a 74 y.o. female  Chief Complaint   Patient presents with    Referral Request     Patient states that she needs two referrals mammogram and colonoscopy. Requesting aspirin be sent to pharmacy as Ian Rodriguez pays for her aspirin. Denies any abnormal bleeding or bruising. Denies nausea, vomiting, or abdominal pain. Denies chest pain, dizziness, or shortness of breath. Requesting a mammogram and colonoscopy. Past Medical History  Past Medical History:   Diagnosis Date    Anemia NEC     BMI 39.0-39.9,adult 5/15/2017    Chest pain, atypical     CKD (chronic kidney disease)     Constipation     Diabetes ([facility]     Ectopic pregnancy     Essential hypertension 2/11/2011    History of Holter monitoring 10/14/2013    Baseline sinus rhythm w/extremely infrequent PVCs, which are likely cause of patient's symptoms.  Hyperlipidemia     Hypertension     Normal stress echo 09/24/2010    Normal study after maximal exercise. No symptoms. Ex time 6 min. EF 60%. Surgical History  Past Surgical History:   Procedure Laterality Date    COLONOSCOPY N/A 9/15/2016    COLONOSCOPYwith polypectomies performed by Sherren Marsh, [facility] at [facility] END[facility]COPY    HX HYSTERECTOMY          Medications  Current Outpatient Prescriptions   Medication Sig Dispense Refill    aspirin delayed-release 81 mg tablet Take 1 Tab by mouth daily. 90 Tab 0    insulin glargine (LANTUS SOLOSTAR U-100 INSULIN) 100 unit/mL (3 mL) inpn 50 units in the morning and 40 units at night 20 Pen 1    glucose blood VI test strips (PRECISION XTRA TEST) strip by Does Not Apply route See Admin Instructions. Test BG twice a day. Dx:  E11.9      glucose blood VI test strips (PRECISION XTRA TEST) strip Precision extra test strips. Test 2 times a day. Dx:  E11.9 200 Strip 3    insulin aspart (NOVOLOG) 100 unit/mL inpn 10 Units by SubCUTAneous route Before breakfast, lunch, and dinner.  5 Pen 0    rosuvastatin (CRESTOR) 5 mg tablet Take 1 Tab by mouth nightly. 90 Tab 3    NIFEdipine ER (ADALAT CC) 30 mg ER tablet Take 2 Tabs by mouth daily. 180 Tab 3    Insulin Needles, Disposable, (BD INSULIN PEN NEEDLE UF MINI) 31 gauge x 3/16\" ndle Use with Lantus Insulin injections twice a day 200 Pen Needle 3    Blood-Glucose Meter monitoring kit Free style lite glucose meter. Test blood sugar 2 times a day. Dx: E11.65 1 Kit 0    Lancets misc Lancets for Free Style lite glucose meter. Test blood sugar 2 times a day. Dx; E11.65 200 Each 3    OTHER Ketamine 10%/Gabapentin 6%/ Baclofen 2%/ Cyclobenzaprine 2%/ Lidocaine 2%/ Flurbiprofen 10%  Apply 1-2 Grams to the affected area 3-4 Times per day. QTY-180 GM  REFILLS-5      vit A & D3 in cod liver oil (COD LIVER OIL) 4,000 unit-400 unit/5 mL Liqd Take  by mouth. 1 tablespoon daily      diclofenac (VOLTAREN) 1 % gel Apply  to affected area four (4) times daily. Apply to knees and neck as needed. 100 g 1    ibuprofen (MOTRIN) 800 mg tablet Take 1 Tab by mouth every eight (8) hours as needed for Pain. 30 Tab 1    cyclobenzaprine (FLEXERIL) 5 mg tablet Take 1 Tab by mouth two (2) times daily as needed for Muscle Spasm(s). 30 Tab 0    oxyCODONE-acetaminophen (PERCOCET) 5-325 mg per tablet Take 1 tablet every 4-6 hours as needed for pain control. If you were instructed to try over the counter ibuprofen or tylenol, only take the percocet for pain not controlled with the over the counter medication.  12 Tab 0       Allergies  Allergies   Allergen Reactions    Allegra-D 12 Hour [Fexofenadine-Pseudoephedrine] Shortness of Breath    Zocor [Simvastatin] Other (comments)     Intolerance, muscle ache       Family History  Family History   Problem Relation Age of Onset    Heart Attack Mother     Other Mother      CAD    Diabetes Mother     Diabetes Sister     HIV/AIDS Brother     Hypertension Sister     Stroke Sister     Diabetes Sister     Seizures Sister     Heart Failure Brother        Social History  Social History     Social History    Marital status:      Spouse name: N/A    Number of children: N/A    Years of education: N/A     Occupational History    Not on file. Social History Main Topics    Smoking status: Former Smoker    Smokeless tobacco: Never Used    Alcohol use No    Drug use: No    Sexual activity: Not on file     Other Topics Concern    Not on file     Social History Narrative       Problem List  Patient Active Problem List   Diagnosis Code    Hyperlipidemia E78.5    Diabetes mellitus type 2 in obese ([facility] E11.69, E66.9    Essential hypertension I10    Constipation K59.00    Abdominal pain, epigastric R10.13    Elevated CPK R74.8    Diabetes ([facility] E11.9    CKD (chronic kidney disease) N18.9    Type II diabetes mellitus, uncontrolled ([facility] E11.65    BMI 39.0-39.9,adult Z68.39    Type 2 diabetes with nephropathy (HCC) E11.21    Obesity, morbid (HCC) E66.01       Review of Systems  Review of Systems   Constitutional: Negative for chills and fever. Respiratory: Negative for shortness of breath. Cardiovascular: Negative for chest pain and palpitations. Gastrointestinal: Negative for abdominal pain, blood in stool, nausea and vomiting. Genitourinary: Negative for hematuria. Endo/Heme/Allergies: Negative for environmental allergies. Vital Signs  Vitals:    05/17/18 1138   BP: 141/79   Pulse: 92   Resp: 16   Temp: 97.5 °F (36.4 °C)   TempSrc: Oral   SpO2: 97%   Weight: 235 lb (106.6 kg)   Height: 5' 2\" (1.575 m)   PainSc:   0 - No pain       Physical Exam  Physical Exam   Constitutional: She is oriented to person, place, and time. HENT:   Mouth/Throat: Oropharynx is clear and moist.   Cardiovascular: Normal rate, regular rhythm and normal heart sounds. Pulmonary/Chest: Effort normal and breath sounds normal. No respiratory distress. Abdominal: Soft. Bowel sounds are normal. She exhibits no distension. There is no tenderness.    Neurological: She is alert and oriented to person, place, and time. Psychiatric: She has a normal mood and affect. Diagnostics  Orders Placed This Encounter    [facility] MAMMO BI SCREENING INCL CAD     Standing Status:   Future     Standing Expiration Date:   6/17/2019     Order Specific Question:   Reason for Exam     Answer:   annual    aspirin delayed-release 81 mg tablet     Sig: Take 1 Tab by mouth daily. Dispense:  90 Tab     Refill:  0       Results  Results for orders placed or performed during the hospital encounter of 10/25/17   NUCLEAR STRESS TEST   Result Value Ref Range    Diagnosis Negative response to lexiscan by ECG criteria. Test indication Chest Pain     Functional capacity Could Not Be Adequately Assessed     ECG Interp. Before Exercise Normal     ECG Interp. During Exercise none     Overall HR response to exercise appropriate     Overall BP response to exercise normal resting BP - appropriate response     Max. Systolic  mmHg    Max. Diastolic BP 86 mmHg    Max. Heart rate 129 BPM    [facility] treadmill score      Zee TM score result      Peak Ex METs 1.6 METS    Protocol name Shelby Stewart cardiac condition      Attending physician             Assessment and Plan  Diagnoses and all orders for this visit:    1. Diabetes mellitus without complication (HCC)  -     aspirin delayed-release 81 mg tablet; Take 1 Tab by mouth daily. 2. Essential hypertension  -     aspirin delayed-release 81 mg tablet; Take 1 Tab by mouth daily. 3. Screening for breast cancer  -     [facility] MAMMO BI SCREENING INCL CAD; Future      Encouraged to complete labs. Educated on her last colonoscopy. After care summary printed and reviewed with patient. Plan reviewed with patient. Questions answered. Patient verbalized understanding of plan and is in agreement with plan. Patient to follow up in three months or earlier if symptoms worsen.      Sarajane Cushing, TRACEYP-C

## 2018-05-17 NOTE — MR AVS SNAPSHOT
[facility] ([place] 
 
 
 [address] [address] [address] [address] 
[phone] Patient: Umm Rae MRN: [id number] Visit Information Date & Time Provider Department Dept. Phone Encounter #  
 5/17/2018 11:15 AM Otis Butterfield, SAIDA [address] [id number] Your Appointments 6/13/2018  8:45 AM  
Follow Up with Kelley Gibbons MD  
[facility] at [address] [facility]) Appt Note: 6 month fu  
 [address] [address] [address] [facility] 630 [facility] [address] [facility] Upcoming Health Maintenance Date Due DTaP/Tdap/Td series (1 - Tdap) 1/21/1964 LIPID PANEL Q1 1/27/2018 MICROALBUMIN Q1 1/28/2018 HEMOGLOBIN A1C Q6M 2/17/2018 Pneumococcal 65+ Low/Medium Risk (2 of 2 - PPSV23) 3/8/2018 MEDICARE YEARLY EXAM 3/14/2018 Influenza Age 5 to Adult 8/1/2018 FOOT EXAM Q1 10/19/2018 EYE EXAM RETINAL OR DILATED Q1 2/6/2019 COLONOSCOPY 9/20/2019 GLAUCOMA SCREENING Q2Y 2/6/2020 Allergies as of 5/17/2018  Review Complete On: 5/17/2018 By: Dano Mcdowell LPN Severity Noted Reaction Type Reactions Allegra-d 12 Hour [Fexofenadine-pseudoephedrine]  09/23/2011    Shortness of Breath Zocor [Simvastatin]  06/11/2010    Other (comments) Intolerance, muscle ache Current Immunizations  Reviewed on 3/8/2017 No immunizations on file. Not reviewed this visit You Were Diagnosed With   
  
 Codes Comments Diabetes mellitus without complication ([facility]    -  Primary ICD-10-CM: E11.9 ICD-9-CM: 250.00 Essential hypertension     ICD-10-CM: I10 
ICD-9-CM: 401.9 Screening for breast cancer     ICD-10-CM: Z12.31 
ICD-9-CM: V76.10 Vitals BP Pulse Temp Resp Height(growth percentile) Weight(growth percentile) 141/79 (BP 1 Location: Left arm, BP Patient Position: Sitting) 92 97.5 °F (36.4 °C) (Oral) 16 5' 2\" (1.575 m) 235 lb (106.6 kg) SpO2 BMI OB Status Smoking Status 97% 42.98 kg/m2 Hysterectomy Former Smoker BMI and BSA Data Body Mass Index Body Surface Area 42.98 kg/m 2 2.16 m 2 Preferred Pharmacy Pharmacy Name Phone [facility] [phone] [phone] Your Updated Medication List  
  
   
This list is accurate as of 5/17/18 11:58 AM.  Always use your most recent med list.  
  
  
  
  
 aspirin delayed-release 81 mg tablet Take 1 Tab by mouth daily. Blood-Glucose Meter monitoring kit Free style lite glucose meter. Test blood sugar 2 times a day. Dx: E11.65 COD LIVER OIL 4,000 unit-400 unit/5 mL Liqd Generic drug:  vit A and D3 in cod liver oil Take  by mouth. 1 tablespoon daily  
  
 cyclobenzaprine 5 mg tablet Commonly known as:  FLEXERIL Take 1 Tab by mouth two (2) times daily as needed for Muscle Spasm(s). diclofenac 1 % Gel Commonly known as:  VOLTAREN Apply  to affected area four (4) times daily. Apply to knees and neck as needed. ibuprofen 800 mg tablet Commonly known as:  MOTRIN Take 1 Tab by mouth every eight (8) hours as needed for Pain. insulin aspart U-100 100 unit/mL Inpn Commonly known as:  NOVOLOG  
10 Units by SubCUTAneous route Before breakfast, lunch, and dinner. insulin glargine 100 unit/mL (3 mL) Inpn Commonly known as:  LANTUS SOLOSTAR U-100 INSULIN  
50 units in the morning and 40 units at night Insulin Needles (Disposable) 31 gauge x 3/16\" Ndle Commonly known as:  BD ULTRA-FINE MINI PEN NEEDLE Use with Lantus Insulin injections twice a day Lancets Misc Lancets for Free Style lite glucose meter. Test blood sugar 2 times a day. Dx; E11.65  
  
 NIFEdipine ER 30 mg ER tablet Commonly known as:  ADALAT CC  
 Take 2 Tabs by mouth daily. OTHER Ketamine 10%/Gabapentin 6%/ Baclofen 2%/ Cyclobenzaprine 2%/ Lidocaine 2%/ Flurbiprofen 10% Apply 1-2 Grams to the affected area 3-4 Times per day. QTY-180 GM REFILLS-5  
  
 oxyCODONE-acetaminophen 5-325 mg per tablet Commonly known as:  PERCOCET Take 1 tablet every 4-6 hours as needed for pain control. If you were instructed to try over the counter ibuprofen or tylenol, only take the percocet for pain not controlled with the over the counter medication. * PRECISION XTRA TEST strip Generic drug:  glucose blood VI test strips  
by Does Not Apply route See Admin Instructions. Test BG twice a day. Dx:  E11.9  
  
 * glucose blood VI test strips strip Commonly known as:  PRECISION XTRA TEST Precision extra test strips. Test 2 times a day. Dx:  E11.9  
  
 rosuvastatin 5 mg tablet Commonly known as:  CRESTOR Take 1 Tab by mouth nightly. * Notice: This list has 2 medication(s) that are the same as other medications prescribed for you. Read the directions carefully, and ask your doctor or other care provider to review them with you. Prescriptions Sent to Pharmacy Refills  
 aspirin delayed-release 81 mg tablet 0 Sig: Take 1 Tab by mouth daily. Class: Normal  
 Pharmacy: [facility] [address] [place] [address] Ph #: [phone] Route: Oral  
  
To-Do List   
 11/15/2018 Imaging:  [facility] MAMMO BI SCREENING INCL CAD Patient Instructions Please contact our office if you have any questions about your visit today. Introducing \[facility] [place] [facility]\"" & HEALTH SERVICES! Dear Ian Neighbours: Thank you for requesting a [employer] account. Our records indicate that you have previously registered for a [employer] account but its currently inactive. Please call our [employer] support line at [phone]. Additional Information If you have questions, please visit the Frequently Asked Questions section of the [employer] website at [email or web address] Remember, [employer] is NOT to be used for urgent needs. For medical emergencies, dial 911. Now available from your iPhone and Android! Please provide this summary of care documentation to your next provider. Your primary care clinician is listed as BRENNA WEAVER. If you have any questions after today's visit, please call [phone].

## 2018-06-08 ENCOUNTER — HOSPITAL ENCOUNTER (OUTPATIENT)
Dept: LAB | Age: 73
Discharge: HOME | End: 2018-06-08
Payer: MEDICARE

## 2018-06-08 DIAGNOSIS — E11.21 TYPE 2 DIABETES WITH NEPHROPATHY (HCC): ICD-10-CM

## 2018-06-08 DIAGNOSIS — I10 ESSENTIAL HYPERTENSION: ICD-10-CM

## 2018-06-08 DIAGNOSIS — T78.40XS ALLERGIC REACTION, SEQUELA: ICD-10-CM

## 2018-06-08 DIAGNOSIS — E78.5 HYPERLIPIDEMIA, UNSPECIFIED HYPERLIPIDEMIA TYPE: ICD-10-CM

## 2018-06-08 LAB
ALBUMIN SERPL-MCNC: 3.5 G/DL (ref 3.4–5)
ALBUMIN/GLOB SERPL: 0.9 {RATIO} (ref 0.8–1.7)
ALP SERPL-CCNC: 94 U/L (ref 45–117)
ALT SERPL-CCNC: 27 U/L (ref 13–56)
ANION GAP SERPL CALC-SCNC: 4 MMOL/L (ref 3–18)
AST SERPL-CCNC: 20 U/L (ref 15–37)
BILIRUB SERPL-MCNC: 0.4 MG/DL (ref 0.2–1)
BUN SERPL-MCNC: 13 MG/DL (ref 7–18)
BUN/CREAT SERPL: 11 (ref 12–20)
CALCIUM SERPL-MCNC: 8.7 MG/DL (ref 8.5–10.1)
CHLORIDE SERPL-SCNC: 104 MMOL/L (ref 100–108)
CHOLEST SERPL-MCNC: 151 MG/DL
CO2 SERPL-SCNC: 31 MMOL/L (ref 21–32)
CREAT SERPL-MCNC: 1.16 MG/DL (ref 0.6–1.3)
EST. AVERAGE GLUCOSE BLD GHB EST-MCNC: 217 MG/DL
GLOBULIN SER CALC-MCNC: 3.9 G/DL (ref 2–4)
GLUCOSE SERPL-MCNC: 184 MG/DL (ref 74–99)
HBA1C MFR BLD: 9.2 % (ref 4.2–5.6)
HDLC SERPL-MCNC: 53 MG/DL (ref 40–60)
HDLC SERPL: 2.8 {RATIO} (ref 0–5)
LDLC SERPL CALC-MCNC: 84.4 MG/DL (ref 0–100)
LIPID PROFILE,FLP: NORMAL
POTASSIUM SERPL-SCNC: 4.1 MMOL/L (ref 3.5–5.5)
PROT SERPL-MCNC: 7.4 G/DL (ref 6.4–8.2)
SODIUM SERPL-SCNC: 139 MMOL/L (ref 136–145)
TRIGL SERPL-MCNC: 68 MG/DL (ref ?–150)
VLDLC SERPL CALC-MCNC: 13.6 MG/DL

## 2018-06-08 PROCEDURE — 80061 LIPID PANEL: CPT | Performed by: FAMILY MEDICINE

## 2018-06-08 PROCEDURE — 86003 ALLG SPEC IGE CRUDE XTRC EA: CPT

## 2018-06-08 PROCEDURE — 36415 COLL VENOUS BLD VENIPUNCTURE: CPT | Performed by: FAMILY MEDICINE

## 2018-06-08 PROCEDURE — 83036 HEMOGLOBIN GLYCOSYLATED A1C: CPT | Performed by: FAMILY MEDICINE

## 2018-06-08 PROCEDURE — 80053 COMPREHEN METABOLIC PANEL: CPT | Performed by: FAMILY MEDICINE

## 2018-06-12 ENCOUNTER — HOSPITAL ENCOUNTER (OUTPATIENT)
Dept: MAMMOGRAPHY | Age: 73
Discharge: HOME | End: 2018-06-12
Attending: NURSE PRACTITIONER
Payer: MEDICARE

## 2018-06-12 DIAGNOSIS — Z12.31 ENCOUNTER FOR SCREENING MAMMOGRAM FOR BREAST CANCER: ICD-10-CM

## 2018-06-12 DIAGNOSIS — Z12.39 SCREENING FOR BREAST CANCER: ICD-10-CM

## 2018-06-12 PROCEDURE — 77063 BREAST TOMOSYNTHESIS BI: CPT

## 2018-06-14 NOTE — PROGRESS NOTES
Please inform patient her mammogram showed no malignancy and she should have annual mammograms.  [facility]

## 2018-06-15 LAB
Lab: NORMAL
REFERENCE LAB,REFLB: NORMAL
TEST DESCRIPTION:,ATST: NORMAL

## 2018-07-11 ENCOUNTER — OFFICE VISIT (OUTPATIENT)
Dept: NEUROLOGY | Age: 73
End: 2018-07-11

## 2018-07-11 VITALS
RESPIRATION RATE: 18 BRPM | WEIGHT: 236 LBS | TEMPERATURE: 98 F | DIASTOLIC BLOOD PRESSURE: 70 MMHG | BODY MASS INDEX: 43.43 KG/M2 | OXYGEN SATURATION: 99 % | HEIGHT: 62 IN | SYSTOLIC BLOOD PRESSURE: 140 MMHG | HEART RATE: 80 BPM

## 2018-07-11 DIAGNOSIS — R41.3 MEMORY LOSS: Primary | ICD-10-CM

## 2018-07-11 NOTE — MR AVS SNAPSHOT
[address] [address] [address] 200 [facility] 
[phone] Patient: Tatiana Wells MRN: [id number] KZY:[birth year] Visit Information Date & Time Provider Department Dept. Phone Encounter #  
 7/11/2018  1:45 PM More Hitchcock NP [facility] at [address] [place] [address] [id number] Follow-up Instructions Return for [facility] 2019 or sooner as needed. Upcoming Health Maintenance Date Due DTaP/Tdap/Td series (1 - Tdap) 1/21/1966 MICROALBUMIN Q1 1/28/2018 Pneumococcal 65+ Low/Medium Risk (2 of 2 - PPSV23) 3/8/2018 MEDICARE YEARLY EXAM 3/14/2018 Influenza Age 5 to Adult 8/1/2018 FOOT EXAM Q1 10/19/2018 HEMOGLOBIN A1C Q6M 12/8/2018 EYE EXAM RETINAL OR DILATED Q1 2/6/2019 LIPID PANEL Q1 6/8/2019 COLONOSCOPY 9/20/2019 GLAUCOMA SCREENING Q2Y 2/6/2020 Allergies as of 7/11/2018  Review Complete On: 5/17/2018 By: Asia Nobles NP Severity Noted Reaction Type Reactions Allegra-d 12 Hour [Fexofenadine-pseudoephedrine]  09/23/2011    Shortness of Breath Zocor [Simvastatin]  06/11/2010    Other (comments) Intolerance, muscle ache Current Immunizations  Reviewed on 3/8/2017 No immunizations on file. Not reviewed this visit Vitals BP Pulse Temp Resp Height(growth percentile) Weight(growth percentile) 140/70 (BP 1 Location: Left arm, BP Patient Position: Sitting) 80 98 °F (36.7 °C) (Oral) 18 5' 2\" (1.575 m) 236 lb (107 kg) SpO2 BMI OB Status Smoking Status 99% 43.16 kg/m2 Hysterectomy Former Smoker BMI and BSA Data Body Mass Index Body Surface Area  
 43.16 kg/m 2 2.16 m 2 Preferred Pharmacy Pharmacy Name Phone [facility] [phone] [phone] Your Updated Medication List  
  
   
This list is accurate as of 7/11/18  2:09 PM.  Always use your most recent med list.  
 aspirin delayed-release 81 mg tablet Take 1 Tab by mouth daily. Blood-Glucose Meter monitoring kit Free style lite glucose meter. Test blood sugar 2 times a day. Dx: E11.65 COD LIVER OIL 4,000 unit-400 unit/5 mL Liqd Generic drug:  vit A and D3 in cod liver oil Take  by mouth. 1 tablespoon daily  
  
 cyclobenzaprine 5 mg tablet Commonly known as:  FLEXERIL Take 1 Tab by mouth two (2) times daily as needed for Muscle Spasm(s). diclofenac 1 % Gel Commonly known as:  VOLTAREN Apply  to affected area four (4) times daily. Apply to knees and neck as needed. ibuprofen 800 mg tablet Commonly known as:  MOTRIN Take 1 Tab by mouth every eight (8) hours as needed for Pain. insulin aspart U-100 100 unit/mL Inpn Commonly known as:  NOVOLOG  
10 Units by SubCUTAneous route Before breakfast, lunch, and dinner. insulin glargine 100 unit/mL (3 mL) Inpn Commonly known as:  LANTUS SOLOSTAR U-100 INSULIN  
50 units in the morning and 40 units at night Insulin Needles (Disposable) 31 gauge x 3/16\" Ndle Commonly known as:  BD ULTRA-FINE MINI PEN NEEDLE Use with Lantus Insulin injections twice a day Lancets Misc Lancets for Free Style lite glucose meter. Test blood sugar 2 times a day. Dx; E11.65  
  
 NIFEdipine ER 30 mg ER tablet Commonly known as:  ADALAT CC Take 2 Tabs by mouth daily. OTHER Ketamine 10%/Gabapentin 6%/ Baclofen 2%/ Cyclobenzaprine 2%/ Lidocaine 2%/ Flurbiprofen 10% Apply 1-2 Grams to the affected area 3-4 Times per day. QTY-180 GM REFILLS-5  
  
 oxyCODONE-acetaminophen 5-325 mg per tablet Commonly known as:  PERCOCET Take 1 tablet every 4-6 hours as needed for pain control. If you were instructed to try over the counter ibuprofen or tylenol, only take the percocet for pain not controlled with the over the counter medication. * PRECISION XTRA TEST strip Generic drug:  glucose blood VI test strips by Does Not Apply route See Admin Instructions. Test BG twice a day. Dx:  E11.9  
  
 * glucose blood VI test strips strip Commonly known as:  PRECISION XTRA TEST Precision extra test strips. Test 2 times a day. Dx:  E11.9  
  
 rosuvastatin 5 mg tablet Commonly known as:  CRESTOR Take 1 Tab by mouth nightly. * Notice: This list has 2 medication(s) that are the same as other medications prescribed for you. Read the directions carefully, and ask your doctor or other care provider to review them with you. Follow-up Instructions Return for [facility] 2019 or sooner as needed. Introducing [facility] SERVICES! Lupe Caruso introduces [employer] patient portal. Now you can access parts of your medical record, email your doctor's office, and request medication refills online. 1. In your internet browser, go to [email or web address] 2. Click on the First Time User? Click Here link in the Sign In box. You will see the New Member Sign Up page. 3. Enter your [employer] Access Code exactly as it appears below. You will not need to use this code after youve completed the sign-up process. If you do not sign up before the expiration date, you must request a new code. · [employer] Access Code: [email or web address] Expires: 8/19/2018  5:11 PM 
 
4. Enter the last four digits of your Social Security Number (xxxx) and Date of Birth (mm/dd/yyyy) as indicated and click Submit. You will be taken to the next sign-up page. 5. Create a [employer] ID. This will be your [employer] login ID and cannot be changed, so think of one that is secure and easy to remember. 6. Create a [employer] password. You can change your password at any time. 7. Enter your Password Reset Question and Answer. This can be used at a later time if you forget your password. 8. Enter your e-mail address. You will receive e-mail notification when new information is available in [address] 9. Click Sign Up. You can now view and download portions of your medical record. 10. Click the Download Summary menu link to download a portable copy of your medical information. If you have questions, please visit the Frequently Asked Questions section of the [employer] website. Remember, [employer] is NOT to be used for urgent needs. For medical emergencies, dial 911. Now available from your iPhone and Android! Please provide this summary of care documentation to your next provider. Your primary care clinician is listed as BRENNA WEAVER. If you have any questions after today's visit, please call [phone].

## 2018-07-11 NOTE — PROGRESS NOTES
[facility] [address], [address], [place], [address]  27 Marybel Stafford. [facility], [address]  Office:  [phone]  Fax: [phone]  Chief Complaint   Patient presents with    Follow-up     memory loss     This is a 49-year-old female who presents for follow-up memory loss. Previously seen by Dr. Simón Saavedra. Overall she feels her memory is normal for age. Denies any specific concerns or events that have happened in the interim. Denies dangerous behaviors. Denies hallucinations. She endorses no trouble driving. Endorses obeying traffic laws. Denies getting any traffic accidents. She lives at home with her . She maintains active during the day. She does enjoy reading. She maintains socially active. She maintains physically active as tolerated. She has no new complaints at this time. She has been taking B12. She has not wanted to move forward with further evaluation for her memory. She would like to hold off on this until her next visit. Past Medical History:   Diagnosis Date    Anemia NEC     BMI 39.0-39.9,adult 5/15/2017    Chest pain, atypical     CKD (chronic kidney disease)     Constipation     Diabetes ([facility] [address]     Ectopic pregnancy     Essential hypertension 2/11/2011    History of Holter monitoring 10/14/2013    Baseline sinus rhythm w/extremely infrequent PVCs, which are likely cause of patient's symptoms.  Hyperlipidemia     Hypertension     Normal stress echo 09/24/2010    Normal study after maximal exercise. No symptoms. Ex time 6 min. EF 60%. Past Surgical History:   Procedure Laterality Date    COLONOSCOPY N/A 9/15/2016    COLONOSCOPYwith polypectomies performed by Kathy Irizarry MD at [facility]OSCOPY    HX HYSTERECTOMY         Current Outpatient Prescriptions   Medication Sig Dispense Refill    aspirin delayed-release 81 mg tablet Take 1 Tab by mouth daily.  90 Tab 0    insulin glargine (LANTUS SOLOSTAR U-100 INSULIN) 100 unit/mL (3 mL) inpn 50 units in the morning and 40 units at night 20 Pen 1    diclofenac (VOLTAREN) 1 % gel Apply  to affected area four (4) times daily. Apply to knees and neck as needed. 100 g 1    glucose blood VI test strips (PRECISION XTRA TEST) strip by Does Not Apply route See Admin Instructions. Test BG twice a day. Dx:  E11.9      glucose blood VI test strips (PRECISION XTRA TEST) strip Precision extra test strips. Test 2 times a day. Dx:  E11.9 200 Strip 3    insulin aspart (NOVOLOG) 100 unit/mL inpn 10 Units by SubCUTAneous route Before breakfast, lunch, and dinner. 5 Pen 0    rosuvastatin (CRESTOR) 5 mg tablet Take 1 Tab by mouth nightly. 90 Tab 3    NIFEdipine ER (ADALAT CC) 30 mg ER tablet Take 2 Tabs by mouth daily. 180 Tab 3    Insulin Needles, Disposable, (BD INSULIN PEN NEEDLE UF MINI) 31 gauge x 3/16\" ndle Use with Lantus Insulin injections twice a day 200 Pen Needle 3    Blood-Glucose Meter monitoring kit Free style lite glucose meter. Test blood sugar 2 times a day. Dx: E11.65 1 Kit 0    Lancets misc Lancets for Free Style lite glucose meter. Test blood sugar 2 times a day. Dx; E11.65 200 Each 3    OTHER Ketamine 10%/Gabapentin 6%/ Baclofen 2%/ Cyclobenzaprine 2%/ Lidocaine 2%/ Flurbiprofen 10%  Apply 1-2 Grams to the affected area 3-4 Times per day. QTY-180 GM  REFILLS-5      vit A & D3 in cod liver oil (COD LIVER OIL) 4,000 unit-400 unit/5 mL Liqd Take  by mouth. 1 tablespoon daily      ibuprofen (MOTRIN) 800 mg tablet Take 1 Tab by mouth every eight (8) hours as needed for Pain. 30 Tab 1    cyclobenzaprine (FLEXERIL) 5 mg tablet Take 1 Tab by mouth two (2) times daily as needed for Muscle Spasm(s). 30 Tab 0    oxyCODONE-acetaminophen (PERCOCET) 5-325 mg per tablet Take 1 tablet every 4-6 hours as needed for pain control.   If you were instructed to try over the counter ibuprofen or tylenol, only take the percocet for pain not controlled with the over the counter medication. 12 Tab 0        Allergies   Allergen Reactions    Allegra-D 12 Hour [Fexofenadine-Pseudoephedrine] Shortness of Breath    Zocor [Simvastatin] Other (comments)     Intolerance, muscle ache       Social History   Substance Use Topics    Smoking status: Former Smoker    Smokeless tobacco: Never Used    Alcohol use No       Family History   Problem Relation Age of Onset    Heart Attack Mother     Other Mother      CAD    Diabetes Mother     Diabetes Sister     HIV/AIDS Brother     Hypertension Sister     Stroke Sister     Diabetes Sister     Seizures Sister     Heart Failure Brother        Review of Systems  Pertinent positives and negatives as noted otherwise comprehensive review is negative. Examination  Visit Vitals    /70 (BP 1 Location: Left arm, BP Patient Position: Sitting)    Pulse 80    Temp 98 °F (36.7 °C) (Oral)    Resp 18    Ht 5' 2\" (1.575 m)    Wt 107 kg (236 lb)    SpO2 99%    BMI 43.16 kg/m2     Pleasant 73-year-old female, well appearing. No icterus. She is alert and oriented. She is able to discuss her medical history. She is able to discuss her medications. Her speech is clear. She answers appropriately. No abnormal movements. No signs of incoordination or ataxia. Steady gait. Impression/Plan  Melanie Hutchins is a 74 y.o. female whose history and physical are consistent with memory loss. Overall she feels like she is doing quite well. Denies any specific complaints at this time. She would like to hold off on further evaluation for her memory until her next appointment. Patient will call office earlier if she has any concerns. We discussed the importance of doing something every day to keep her mind active such as reading or doing puzzles. Maintain physically and socially active. Discussed safety. No medication or order changes at this time. She will follow-up in the spring.   All questions addressed and patient is agreeable to plan of care.    Diagnoses and all orders for this visit:    1. Memory loss    Total time: 15 min   Counseling / coordination time: 12 min   > 50% counseling / coordination?: Yes re: symptoms, management, answering questions, safety, chart review, and plan of care. Signed By: Loreli Severin, NP    This note will not be viewable in [address]
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
## 2019-01-16 ENCOUNTER — TELEPHONE (OUTPATIENT)
Dept: FAMILY MEDICINE CLINIC | Age: 74
End: 2019-01-16

## 2019-01-16 NOTE — TELEPHONE ENCOUNTER
----- Message from Deb Colmenares MD sent at 1/10/2019 11:06 PM EST -----  Please find out if pt is being seen elsewhere now. She has not had a routine f/u visit for several months.

## 2019-04-30 ENCOUNTER — OFFICE VISIT (OUTPATIENT)
Dept: NEUROLOGY | Age: 74
End: 2019-04-30

## 2019-04-30 VITALS
DIASTOLIC BLOOD PRESSURE: 80 MMHG | HEART RATE: 85 BPM | SYSTOLIC BLOOD PRESSURE: 150 MMHG | RESPIRATION RATE: 18 BRPM | BODY MASS INDEX: 43.61 KG/M2 | WEIGHT: 237 LBS | OXYGEN SATURATION: 99 % | HEIGHT: 62 IN | TEMPERATURE: 98.4 F

## 2019-04-30 DIAGNOSIS — R41.3 MEMORY LOSS: Primary | ICD-10-CM

## 2019-04-30 NOTE — PROGRESS NOTES
[facility] [address] , [place], [address] [facility], Rik simmons, [address] Office:  [phone]  Fax: [phone] Chief Complaint Patient presents with  Memory Loss  
  follow up This is a 47-year-old female who presents for follow-up memory loss. In the interim endorses no additional concerns. She feels that she has some mild memory loss. She feels this is normal for age. Denies any dangerous behaviors. Denies wandering, hallucinations, difficulty obeying traffic laws, falls or additional concerns at this time. She is not on any medications for this. She would like to defer additional evaluation at this time. Past Medical History:  
Diagnosis Date  Anemia NEC   
 BMI 39.0-39.9,adult 5/15/2017  Chest pain, atypical   
 CKD (chronic kidney disease)  Constipation  Diabetes ([facility] [address]  Ectopic pregnancy  Essential hypertension 2/11/2011  History of Holter monitoring 10/14/2013 Baseline sinus rhythm w/extremely infrequent PVCs, which are likely cause of patient's symptoms.  Hyperlipidemia  Hypertension  Normal stress echo 09/24/2010 Normal study after maximal exercise. No symptoms. Ex time 6 min. EF 60%. Past Surgical History:  
Procedure Laterality Date  COLONOSCOPY N/A 9/15/2016 COLONOSCOPYwith polypectomies performed by Chandan Fitzpatrick MD at [facility] ENDOSCOPY  
 HX HYSTERECTOMY Current Outpatient Medications Medication Sig Dispense Refill  aspirin delayed-release 81 mg tablet Take 1 Tab by mouth daily. 90 Tab 0  
 insulin glargine (LANTUS SOLOSTAR U-100 INSULIN) 100 unit/mL (3 mL) inpn 50 units in the morning and 40 units at night 20 Pen 1  
 diclofenac (VOLTAREN) 1 % gel Apply  to affected area four (4) times daily. Apply to knees and neck as needed.  100 g 1  
 glucose blood VI test strips (PRECISION XTRA TEST) strip by Does Not Apply route See Admin Instructions. Test BG twice a day. Dx:  E11.9    
 glucose blood VI test strips (PRECISION XTRA TEST) strip Precision extra test strips. Test 2 times a day. Dx:  E11.9 200 Strip 3  
 insulin aspart (NOVOLOG) 100 unit/mL inpn 10 Units by SubCUTAneous route Before breakfast, lunch, and dinner. 5 Pen 0  
 rosuvastatin (CRESTOR) 5 mg tablet Take 1 Tab by mouth nightly. 90 Tab 3  
 NIFEdipine ER (ADALAT CC) 30 mg ER tablet Take 2 Tabs by mouth daily. 180 Tab 3  
 Insulin Needles, Disposable, (BD INSULIN PEN NEEDLE UF MINI) 31 gauge x 3/16\" ndle Use with Lantus Insulin injections twice a day 200 Pen Needle 3  Blood-Glucose Meter monitoring kit Free style lite glucose meter. Test blood sugar 2 times a day. Dx: E11.65 1 Kit 0  
 Lancets misc Lancets for Free Style lite glucose meter. Test blood sugar 2 times a day. Dx; E11.65 200 Each 3  
 OTHER Ketamine 10%/Gabapentin 6%/ Baclofen 2%/ Cyclobenzaprine 2%/ Lidocaine 2%/ Flurbiprofen 10% Apply 1-2 Grams to the affected area 3-4 Times per day. QTY-180 GM REFILLS-5    
 vit A & D3 in cod liver oil (COD LIVER OIL) 4,000 unit-400 unit/5 mL Liqd Take  by mouth. 1 tablespoon daily  ibuprofen (MOTRIN) 800 mg tablet Take 1 Tab by mouth every eight (8) hours as needed for Pain. 30 Tab 1  cyclobenzaprine (FLEXERIL) 5 mg tablet Take 1 Tab by mouth two (2) times daily as needed for Muscle Spasm(s). 30 Tab 0  
 oxyCODONE-acetaminophen (PERCOCET) 5-325 mg per tablet Take 1 tablet every 4-6 hours as needed for pain control. If you were instructed to try over the counter ibuprofen or tylenol, only take the percocet for pain not controlled with the over the counter medication. 12 Tab 0 Allergies Allergen Reactions  Allegra-D 12 Hour [Fexofenadine-Pseudoephedrine] Shortness of Breath  Zocor [Simvastatin] Other (comments) Intolerance, muscle ache Social History Tobacco Use  Smoking status: Former Smoker  Smokeless tobacco: Never Used Substance Use Topics  Alcohol use: No  
 Drug use: No  
 
 
Family History Problem Relation Age of Onset  Heart Attack Mother [facility] Other Mother CAD  Diabetes Mother  Diabetes Sister  HIV/AIDS Brother  Hypertension Sister  Stroke Sister  Diabetes Sister  Seizures Sister  Heart Failure Brother Review of Systems GENERAL: Denies fever or fatigue CARDIAC: No CP or SOB PULMONARY: No cough of SOB MUSCULOSKELETAL: No new joint pain NEURO: SEE HPI Examination Visit Vitals /80 (BP 1 Location: Left arm, BP Patient Position: Sitting) Pulse 85 Temp 98.4 °F (36.9 °C) Resp 18 Ht 5' 2\" (1.575 m) Wt 107.5 kg (237 lb) SpO2 99% BMI 43.35 kg/m² This is a very pleasant 41-year-old female. She is alert and in no apparent distress. Heart is regular. No abnormal movements. No signs of incoordination or ataxia. Steady gait. No results found for this or any previous visit (from the past 24 hour(s)). Impression/Plan Gabriel Flores is a 66 y.o. female whose history and physical are consistent with memory loss. Patient presents for follow-up. Continues to have intermittent  short-term memory loss. Feels this is normal for age and denies any dangerous behaviors. She is not on any medications for this. She would like to defer additional evaluation such as formal memory evaluation at this time. As long as she has no additional concerns follow-up on an as-needed basis. All questions addressed the patient is agreeable with plan of care. Diagnoses and all orders for this visit: 
 
1. Memory loss Signed By: Lori Rojo NP This note will not be viewable in [address] PLEASE NOTE:  
Portions of this document may have been produced using voice recognition software. Unrecognized errors in transcription may be present.

## 2019-08-05 ENCOUNTER — HOSPITAL ENCOUNTER (OUTPATIENT)
Dept: MAMMOGRAPHY | Age: 74
Discharge: HOME | End: 2019-08-05
Attending: INTERNAL MEDICINE
Payer: MEDICARE

## 2019-08-05 DIAGNOSIS — Z12.31 VISIT FOR SCREENING MAMMOGRAM: ICD-10-CM

## 2019-08-05 PROCEDURE — 77063 BREAST TOMOSYNTHESIS BI: CPT

## 2020-08-14 ENCOUNTER — HOSPITAL ENCOUNTER (OUTPATIENT)
Dept: MAMMOGRAPHY | Age: 75
Discharge: HOME | End: 2020-08-14
Payer: MEDICARE

## 2020-08-14 DIAGNOSIS — Z12.31 VISIT FOR SCREENING MAMMOGRAM: ICD-10-CM

## 2020-08-14 PROCEDURE — 77063 BREAST TOMOSYNTHESIS BI: CPT

## 2020-12-31 ENCOUNTER — HOSPITAL ENCOUNTER (OUTPATIENT)
Dept: LAB | Age: 75
Discharge: HOME | End: 2020-12-31
Payer: MEDICARE

## 2020-12-31 PROCEDURE — 87635 SARS-COV-2 COVID-19 AMP PRB: CPT

## 2021-01-02 LAB — SARS-COV-2, COV2NT: NOT DETECTED

## 2021-01-05 ENCOUNTER — HOSPITAL ENCOUNTER (OUTPATIENT)
Age: 76
Setting detail: OUTPATIENT SURGERY
Discharge: HOME | End: 2021-01-05
Attending: INTERNAL MEDICINE | Admitting: INTERNAL MEDICINE
Payer: MEDICARE

## 2021-01-05 VITALS
HEART RATE: 89 BPM | OXYGEN SATURATION: 100 % | BODY MASS INDEX: 39.34 KG/M2 | HEIGHT: 63 IN | SYSTOLIC BLOOD PRESSURE: 147 MMHG | DIASTOLIC BLOOD PRESSURE: 71 MMHG | RESPIRATION RATE: 20 BRPM | TEMPERATURE: 98.4 F | WEIGHT: 222 LBS

## 2021-01-05 LAB — GLUCOSE BLD STRIP.AUTO-MCNC: 208 MG/DL (ref 70–110)

## 2021-01-05 PROCEDURE — 2709999900 HC NON-CHARGEABLE SUPPLY: Performed by: INTERNAL MEDICINE

## 2021-01-05 PROCEDURE — 82962 GLUCOSE BLOOD TEST: CPT

## 2021-01-05 PROCEDURE — 77030038604 HC SNR ENDO EXACTO USEN -B: Performed by: INTERNAL MEDICINE

## 2021-01-05 PROCEDURE — 88305 TISSUE EXAM BY PATHOLOGIST: CPT

## 2021-01-05 PROCEDURE — 76040000007: Performed by: INTERNAL MEDICINE

## 2021-01-05 PROCEDURE — 74011250637 HC RX REV CODE- 250/637: Performed by: INTERNAL MEDICINE

## 2021-01-05 RX ORDER — SODIUM CHLORIDE 0.9 % (FLUSH) 0.9 %
5-40 SYRINGE (ML) INJECTION EVERY 8 HOURS
Status: CANCELLED | OUTPATIENT
Start: 2021-01-05

## 2021-01-05 RX ORDER — DEXTROMETHORPHAN/PSEUDOEPHED 2.5-7.5/.8
1.2 DROPS ORAL
Status: CANCELLED | OUTPATIENT
Start: 2021-01-05

## 2021-01-05 RX ORDER — SODIUM CHLORIDE 0.9 % (FLUSH) 0.9 %
5-40 SYRINGE (ML) INJECTION AS NEEDED
Status: CANCELLED | OUTPATIENT
Start: 2021-01-05

## 2021-01-05 RX ORDER — ATROPINE SULFATE 0.1 MG/ML
0.5 INJECTION INTRAVENOUS
Status: CANCELLED | OUTPATIENT
Start: 2021-01-05 | End: 2021-01-06

## 2021-01-05 RX ORDER — DEXTROMETHORPHAN/PSEUDOEPHED 2.5-7.5/.8
DROPS ORAL AS NEEDED
Status: DISCONTINUED | OUTPATIENT
Start: 2021-01-05 | End: 2021-01-05 | Stop reason: HOSPADM

## 2021-01-05 RX ORDER — FLUMAZENIL 0.1 MG/ML
0.2 INJECTION INTRAVENOUS
Status: CANCELLED | OUTPATIENT
Start: 2021-01-05 | End: 2021-01-05

## 2021-01-05 RX ORDER — EPINEPHRINE 0.1 MG/ML
1 INJECTION INTRACARDIAC; INTRAVENOUS
Status: CANCELLED | OUTPATIENT
Start: 2021-01-05 | End: 2021-01-06

## 2021-01-05 RX ORDER — NALOXONE HYDROCHLORIDE 0.4 MG/ML
0.4 INJECTION, SOLUTION INTRAMUSCULAR; INTRAVENOUS; SUBCUTANEOUS
Status: CANCELLED | OUTPATIENT
Start: 2021-01-05 | End: 2021-01-05

## 2021-01-05 NOTE — DISCHARGE INSTRUCTIONS
Colonoscopy: What to Expect at [address] [place] [address]  After you have a colonoscopy, you will stay at the clinic for 1 to 2 hours until the medicines wear off. Then you can go home. But you will need to arrange for a ride. Your doctor will tell you when you can eat and do your other usual activities. Your doctor will talk to you about when you will need your next colonoscopy. Your doctor can help you decide how often you need to be checked. This will depend on the results of your test and your risk for colorectal cancer. After the test, you may be bloated or have gas pains. You may need to pass gas. If a biopsy was done or a polyp was removed, you may have streaks of blood in your stool (feces) for a few days. This care sheet gives you a general idea about how long it will take for you to recover. But each person recovers at a different pace. Follow the steps below to get better as quickly as possible. How can you care for yourself at home? Activity  · Rest when you feel tired. · You can do your normal activities when it feels okay to do so. Diet  · Follow your doctor's directions for eating. · Unless your doctor has told you not to, drink plenty of fluids. This helps to replace the fluids that were lost during the colon prep. · Do not drink alcohol. Medicines  · If polyps were removed or a biopsy was done during the test, your doctor may tell you not to take aspirin or other anti-inflammatory medicines for a few days. These include ibuprofen (Advil, Motrin) and naproxen (Aleve). Other instructions  · For your safety, do not drive or operate machinery until the medicine wears off and you can think clearly. Your doctor may tell you not to drive or operate machinery until the day after your test.  · Do not sign legal documents or make major decisions until the medicine wears off and you can think clearly. The anesthesia can make it hard for you to fully understand what you are agreeing to.   Follow-up care is a key part of your treatment and safety. Be sure to make and go to all appointments, and call your doctor if you are having problems. It's also a good idea to know your test results and keep a list of the medicines you take. When should you call for help? Call 911 anytime you think you may need emergency care. For example, call if:  · You passed out (lost consciousness). · You pass maroon or bloody stools. · You have severe belly pain. Call your doctor now or seek immediate medical care if:  · Your stools are black and tarlike. · Your stools have streaks of blood, but you did not have a biopsy or any polyps removed. · You have belly pain, or your belly is swollen and firm. · You vomit. · You have a fever. · You are very dizzy. Watch closely for changes in your health, and be sure to contact your doctor if you have any problems. Where can you learn more? Go to [employer]  Enter E264 in the search box to learn more about \"Colonoscopy: What to Expect at Home. \"   © 9760-9305 Healthwise, Incorporated. Care instructions adapted under license by [facility] (which disclaims liability or warranty for this information). This care instruction is for use with your licensed healthcare professional. If you have questions about a medical condition or this instruction, always ask your healthcare professional. [address] any warranty or liability for your use of this information. Content Version: 98.5.044734; Current as of: November 14, 2014    Patient Education      Colon Polyps: Care Instructions  Your Care Instructions     Colon polyps are growths in the colon or the rectum. The cause of most colon polyps is not known, and most people who get them do not have any problems. But a certain kind can turn into cancer. For this reason, regular testing for colon polyps is important for people as they get older.  It is also important for anyone who has an increased risk for colon cancer. Polyps are usually found through routine colon cancer screening tests. Although most colon polyps are not cancerous, they are usually removed and then tested for cancer. Screening for colon cancer saves lives because the cancer can usually be cured if it is caught early. If you have a polyp that is the type that can turn into cancer, you may need more tests to examine your entire colon. The doctor will remove any other polyps that he or she finds, and you will be tested more often. Follow-up care is a key part of your treatment and safety. Be sure to make and go to all appointments, and call your doctor if you are having problems. It's also a good idea to know your test results and keep a list of the medicines you take. How can you care for yourself at home? Regular exams to look for colon polyps are the best way to prevent polyps from turning into colon cancer. These can include stool tests, sigmoidoscopy, colonoscopy, and CT colonography. Talk with your doctor about a testing schedule that is right for you. To prevent polyps  There is no home treatment that can prevent colon polyps. But these steps may help lower your risk for cancer. · Stay active. Being active can help you get to and stay at a healthy weight. Try to exercise on most days of the week. Walking is a good choice. · Eat well. Choose a variety of vegetables, fruits, legumes (such as peas and beans), fish, poultry, and whole grains. · Do not smoke. If you need help quitting, talk to your doctor about stop-smoking programs and medicines. These can increase your chances of quitting for good. · If you drink alcohol, limit how much you drink. Limit alcohol to 2 drinks a day for men and 1 drink a day for women. When should you call for help? Call your doctor now or seek immediate medical care if:    · You have severe belly pain.     · Your stools are maroon or very bloody.    Watch closely for changes in your health, and be sure to contact your doctor if:    · You have a fever.     · You have nausea or vomiting.     · You have a change in bowel habits (new constipation or diarrhea).     · Your symptoms get worse or are not improving as expected. Where can you learn more? Go to http://www.jolly.com/  Enter C571 in the search box to learn more about \"Colon Polyps: Care Instructions. \"  Current as of: April 29, 2020               Content Version: 12.6  © 2006-2020 [employer]. Care instructions adapted under license by [employer] (which disclaims liability or warranty for this information). If you have questions about a medical condition or this instruction, always ask your healthcare professional. [place] [address] any warranty or liability for your use of this information. DISCHARGE SUMMARY from Nurse     POST-PROCEDURE INSTRUCTIONS:    Call your Physician if you:  ? Observe any excess bleeding. ? Develop a temperature over 100.5o F.  ? Experience abdominal, shoulder or chest pain. ? Notice any signs of decreased circulation or nerve impairment to an extremity such as a change in color, persistent numbness, tingling, coldness or increase in pain. ? Vomit blood or you have nausea and vomiting lasting longer than 4 hours. ? Are unable to take medications. ? Are unable to urinate within 8 hours after discharge following general anesthesia or intravenous sedation. For the next 24 hours after receiving general anesthesia or intravenous sedation, or while taking prescription Narcotics, limit your activities:  ? Do NOT drive a motor vehicle, operate hazard machinery or power tools, or perform tasks that require coordination. The medication you received during your procedure may have some effect on your mental awareness. ? Do NOT make important personal or business decisions.   The medication you received during your procedure may have some effect on your mental awareness. ? Do NOT drink alcoholic beverages. These drinks do not mix well with the medications that have been given to you. ? Upon discharge from the hospital, you must be accompanied by a responsible adult. ? Resume your diet as directed by your physician. ? Resume medications as your physician has prescribed. ? Please give a list of your current medications to your Primary Care Provider. ? Please update this list whenever your medications are discontinued, doses are changed, or new medications (including over-the-counter products) are added. ? Please carry medication information at all times in case of emergency situations. These are general instructions for a healthy lifestyle:    No smoking/ No tobacco products/ Avoid exposure to second hand smoke.  Surgeon General's Warning:  Quitting smoking now greatly reduces serious risk to your health. Obesity, smoking, and a sedentary lifestyle greatly increase your risk for illness.  A healthy diet, regular physical exercise & weight monitoring are important for maintaining a healthy lifestyle   You may be retaining fluid if you have a history of heart failure or if you experience any of the following symptoms:  Weight gain of 3 pounds or more overnight or 5 pounds in a week, increased swelling in our hands or feet or shortness of breath while lying flat in bed. Please call your doctor as soon as you notice any of these symptoms; do not wait until your next office visit. Recognize signs and symptoms of STROKE:  F  -  Face looks uneven  A  -  Arms unable to move or move unevenly  S  -  Speech slurred or non-existent  T  -  Time to call 911 - as soon as signs and symptoms begin - DO NOT go back to bed or wait to see If you get better - TIME IS BRAIN. Colorectal Screening   Colorectal cancer almost always develops from precancerous polyps (abnormal growths) in the colon or rectum.   Screening tests can find precancerous polyps, so that they can be removed before they turn into cancer. Screening tests can also find colorectal cancer early, when treatment works best.  Nanette Springer Speak with your physician about when you should begin screening and how often you should be tested. Additional Information    If you have questions, please call [phone]. Remember, MyChart is NOT to be used for urgent needs. For medical emergencies, dial 911. Educational references and/or instructions provided during this visit included:    See attached. APPOINTMENTS:    Please make a follow-up appointment with your physician. Discharge information has been reviewed with the patient. The patient verbalized understanding.

## 2021-01-05 NOTE — H&P
[facility]  [phone]    GASTROENTEROLOGY Pre-Procedure H and P      Impression/Plan:   1. This patient is consented for a colonoscopy for h/o polyps. Chief Complaint: h/o polyps      HPI:  Yumiko Garcia is a 66 y.o. female who presents for a colonoscopy for h/o polyps. PMH:   Past Medical History:   Diagnosis Date    Anemia NEC     BMI 39.0-39.9,adult 5/15/2017    Chest pain, atypical     CKD (chronic kidney disease)     Constipation     Diabetes ([facility] [address]     Ectopic pregnancy     Essential hypertension 2/11/2011    History of Holter monitoring 10/14/2013    Baseline sinus rhythm w/extremely infrequent PVCs, which are likely cause of patient's symptoms.  Hyperlipidemia     Hypertension     Menopause     Normal stress echo 09/24/2010    Normal study after maximal exercise. No symptoms. Ex time 6 min. EF 60%.          PSH:   Past Surgical History:   Procedure Laterality Date    COLONOSCOPY N/A 9/15/2016    COLONOSCOPYwith polypectomies performed by [facility]ua Do [facility] at [facility] ENDOSCOPY    HX HYSTERECTOMY         Social HX:   Social History     Socioeconomic History    Marital status:      Spouse name: Not on file    Number of children: Not on file    Years of education: Not on file    Highest education level: Not on file   Occupational History    Not on file   Social Needs    Financial resource strain: Not on file    Food insecurity     Worry: Not on file     Inability: Not on file    Transportation needs     Medical: Not on file     Non-medical: Not on file   Tobacco Use    Smoking status: Former Smoker    Smokeless tobacco: Never Used   Substance and Sexual Activity    Alcohol use: No    Drug use: No    Sexual activity: Not on file   Lifestyle    Physical activity     Days per week: Not on file     Minutes per session: Not on file    Stress: Not on file   Relationships    Social connections     Talks on phone: Not on file     Gets together: Not on file Attends [employer] service: Not on file     Active member of club or organization: Not on file     Attends meetings of clubs or organizations: Not on file     Relationship status: Not on file    Intimate partner violence     Fear of current or ex partner: Not on file     Emotionally abused: Not on file     Physically abused: Not on file     Forced sexual activity: Not on file   Other Topics Concern    Not on file   Social History Narrative    Not on file       FHX:   Family History   Problem Relation Age of Onset    Heart Attack Mother     Other Mother         CAD    Diabetes Mother     Diabetes Sister     HIV/AIDS Brother     Hypertension Sister     Stroke Sister     Diabetes Sister     Seizures Sister     Heart Failure Brother        Allergy:   Allergies   Allergen Reactions    Allegra-D 12 Hour [Fexofenadine-Pseudoephedrine] Shortness of Breath    Zocor [Simvastatin] Other (comments)     Intolerance, muscle ache       Home Medications:     Medications Prior to Admission   Medication Sig    aspirin delayed-release 81 mg tablet Take 1 Tab by mouth daily.  ibuprofen (MOTRIN) 800 mg tablet Take 1 Tab by mouth every eight (8) hours as needed for Pain.  glucose blood VI test strips (PRECISION XTRA TEST) strip by Does Not Apply route See Admin Instructions. Test BG twice a day. Dx:  E11.9    glucose blood VI test strips (PRECISION XTRA TEST) strip Precision extra test strips. Test 2 times a day. Dx:  E11.9    insulin aspart (NOVOLOG) 100 unit/mL inpn 10 Units by SubCUTAneous route Before breakfast, lunch, and dinner.  rosuvastatin (CRESTOR) 5 mg tablet Take 1 Tab by mouth nightly.  NIFEdipine ER (ADALAT CC) 30 mg ER tablet Take 2 Tabs by mouth daily.  Blood-Glucose Meter monitoring kit Free style lite glucose meter. Test blood sugar 2 times a day. Dx: E11.65    Lancets misc Lancets for Free Style lite glucose meter. Test blood sugar 2 times a day.  Dx; E11.65    insulin glargine (LANTUS SOLOSTAR U-100 INSULIN) 100 unit/mL (3 mL) inpn 50 units in the morning and 40 units at night    diclofenac (VOLTAREN) 1 % gel Apply  to affected area four (4) times daily. Apply to knees and neck as needed.  cyclobenzaprine (FLEXERIL) 5 mg tablet Take 1 Tab by mouth two (2) times daily as needed for Muscle Spasm(s).  oxyCODONE-acetaminophen (PERCOCET) 5-325 mg per tablet Take 1 tablet every 4-6 hours as needed for pain control. If you were instructed to try over the counter ibuprofen or tylenol, only take the percocet for pain not controlled with the over the counter medication.  Insulin Needles, Disposable, (BD INSULIN PEN NEEDLE UF MINI) 31 gauge x 3/16\" ndle Use with Lantus Insulin injections twice a day    OTHER Ketamine 10%/Gabapentin 6%/ Baclofen 2%/ Cyclobenzaprine 2%/ Lidocaine 2%/ Flurbiprofen 10%  Apply 1-2 Grams to the affected area 3-4 Times per day. QTY-180 GM  REFILLS-5    vit A & D3 in cod liver oil (COD LIVER OIL) 4,000 unit-400 unit/5 mL Liqd Take  by mouth. 1 tablespoon daily       Review of Systems:     A complete 10 point review of systems was performed and pertinents are as per the HPI. Remainder of the review of systems was negative. Visit Vitals  BP (!) 150/63   Pulse 98   Temp 98.2 °F (36.8 °C)   Resp 16   Ht 5' 2.5\" (1.588 m)   Wt 100.7 kg (222 lb)   SpO2 100%   Breastfeeding No   BMI 39.96 kg/m²       Physical Assessment:     General: alert, cooperative, no acute distress, appears stated age. HEENT: normocephalic, no scleral icterus, moist mucous membranes, EOMs intact, no neck masses noted. Respiratory: lungs clear to auscultation bilaterally. Cardiovascular: regular rate and rhythm, no murmurs, rubs or gallops. Abdomen: normal bowel sounds, soft, non-tender to palpation. Extremities: no lower extremity edema, no cyanosis or clubbing. Neuro: alert and oriented x 3; non-focal exam.  Skin: no rashes. Psych: normal mood and affect.            Gabby Szymanski MD  [facility]  [email or web address]  Phone: [phone]  Pager: [phone]  [email or web address]

## 2021-01-05 NOTE — PERIOP NOTES
Patient is diabetic. Protocol Blood glucose check taken, Blood sugar result 208mg/dl. Patient refused to take insulin to lower down BS. She states her blood sugar drop quickly she rather take the insulin when she gets home . Dr. Annabel Moore made aware,no orders taken at this time.

## 2021-01-05 NOTE — PROCEDURES
[facility] [address]  [place], [address]      Brief Procedure Note    Edgar España  1945  [id number]    Date of Procedure: 1/5/2021    Preoperative diagnosis: History of tubular and sessile serrated adenomas of colon:   V12.72 - Z86.010  Colorectal cancer surveillance:   V76.51 - Z12.11  Body mass index 40+ - severely obese:   Z68.41  Diabetes mellitus:   250.00 - E11.9  Encounter for screening for other viral diseases:   V73.0 - Z11.59    Postoperative diagnosis: colon polyps (transverse x 2 ;) mild sigmoid diverticula; hemorrhoids    Type of Anesthesia: MAC (Monitored anesthesia care)    Description of findings: same as post op dx    Procedure: Procedure(s):  COLONOSCOPY without Sedation and polypectomies    :  Dr. Delmi Jacobs MD    Assistant(s): Endoscopy Technician-1: Shayy Mejía  Endoscopy RN-1: Nila Kurtz RN    EBL:None    Specimens:   ID Type Source Tests Collected by Time Destination   1 : transverse polyps  Preservative Colon  Tess Jefferson MD 1/5/2021 0468 Pathology       Findings: See printed and scanned procedure note    Complications: None    Dr. Delmi Jacobs MD  1/5/2021  9:49 AM

## 2021-11-11 ENCOUNTER — HOSPITAL ENCOUNTER (OUTPATIENT)
Dept: WOMENS IMAGING | Age: 76
Discharge: HOME | End: 2021-11-11
Attending: INTERNAL MEDICINE
Payer: MEDICARE

## 2021-11-11 DIAGNOSIS — Z12.31 VISIT FOR SCREENING MAMMOGRAM: ICD-10-CM

## 2021-11-11 PROCEDURE — 77063 BREAST TOMOSYNTHESIS BI: CPT

## 2022-03-19 PROBLEM — E66.01 OBESITY, MORBID (HCC): Status: ACTIVE | Noted: 2018-05-17

## 2022-03-19 PROBLEM — E11.21 TYPE 2 DIABETES WITH NEPHROPATHY (HCC): Status: ACTIVE | Noted: 2018-02-12

## 2022-03-23 ENCOUNTER — OFFICE VISIT (OUTPATIENT)
Dept: ORTHOPEDIC SURGERY | Age: 77
End: 2022-03-23
Payer: MEDICARE

## 2022-03-23 VITALS
BODY MASS INDEX: 40.75 KG/M2 | HEART RATE: 91 BPM | WEIGHT: 230 LBS | HEIGHT: 63 IN | TEMPERATURE: 97.4 F | OXYGEN SATURATION: 100 %

## 2022-03-23 DIAGNOSIS — M48.062 LUMBAR STENOSIS WITH NEUROGENIC CLAUDICATION: Primary | ICD-10-CM

## 2022-03-23 PROCEDURE — 1090F PRES/ABSN URINE INCON ASSESS: CPT | Performed by: PHYSICAL MEDICINE & REHABILITATION

## 2022-03-23 PROCEDURE — G8432 DEP SCR NOT DOC, RNG: HCPCS | Performed by: PHYSICAL MEDICINE & REHABILITATION

## 2022-03-23 PROCEDURE — G8536 NO DOC ELDER MAL SCRN: HCPCS | Performed by: PHYSICAL MEDICINE & REHABILITATION

## 2022-03-23 PROCEDURE — 1101F PT FALLS ASSESS-DOCD LE1/YR: CPT | Performed by: PHYSICAL MEDICINE & REHABILITATION

## 2022-03-23 PROCEDURE — G8427 DOCREV CUR MEDS BY ELIG CLIN: HCPCS | Performed by: PHYSICAL MEDICINE & REHABILITATION

## 2022-03-23 PROCEDURE — G8756 NO BP MEASURE DOC: HCPCS | Performed by: PHYSICAL MEDICINE & REHABILITATION

## 2022-03-23 PROCEDURE — 99203 OFFICE O/P NEW LOW 30 MIN: CPT | Performed by: PHYSICAL MEDICINE & REHABILITATION

## 2022-03-23 PROCEDURE — G8399 PT W/DXA RESULTS DOCUMENT: HCPCS | Performed by: PHYSICAL MEDICINE & REHABILITATION

## 2022-03-23 PROCEDURE — G8417 CALC BMI ABV UP PARAM F/U: HCPCS | Performed by: PHYSICAL MEDICINE & REHABILITATION

## 2022-03-23 RX ORDER — LATANOPROST 50 UG/ML
1 SOLUTION/ DROPS OPHTHALMIC
COMMUNITY
Start: 2022-01-11

## 2022-03-23 RX ORDER — GABAPENTIN 300 MG/1
300 CAPSULE ORAL
COMMUNITY
Start: 2022-02-11 | End: 2022-03-25

## 2022-03-23 RX ORDER — LANOLIN ALCOHOL/MO/W.PET/CERES
1000 CREAM (GRAM) TOPICAL DAILY
COMMUNITY
Start: 2021-05-29

## 2022-03-23 NOTE — PATIENT INSTRUCTIONS
Lumbar Spinal Stenosis: Care Instructions  Your Care Instructions     Stenosis in the spine is a narrowing of the canal that is around the spinal cord and nerve roots in your back. It can happen as part of aging. Sometimes bone and other tissue grow into this canal and press on the nerves that branch out from the spinal cord. This can cause pain, numbness, and weakness. When it happens in the lower part of your back, it is called lumbar spinal stenosis. It can cause problems in the legs, feet, and rear end (buttocks). You may be able to get relief from the symptoms of spinal stenosis by taking pain medicine. Your doctor may suggest physical therapy and exercises to keep your spine strong and flexible. Some people try steroid shots to reduce swelling. If pain and numbness in your legs are still so bad that you cannot do your normal activities, you may need surgery. Follow-up care is a key part of your treatment and safety. Be sure to make and go to all appointments, and call your doctor if you are having problems. It's also a good idea to know your test results and keep a list of the medicines you take. How can you care for yourself at home? · Take an over-the-counter pain medicine. Nonsteroidal anti-inflammatory drugs (NSAIDs) such as ibuprofen or naproxen seem to work best. But if you can't take NSAIDs, you can try acetaminophen. Be safe with medicines. Read and follow all instructions on the label. · Do not take two or more pain medicines at the same time unless the doctor told you to. Many pain medicines have acetaminophen, which is Tylenol. Too much acetaminophen (Tylenol) can be harmful. · Stay at a healthy weight. Being overweight puts extra strain on your spine. · Change positions often when you sit or stand. This can ease pain. It may also reduce pressure on the spinal cord and its nerves. · Avoid doing things that make your symptoms worse.  Walking downhill and standing for a long time may cause pain.  · Stretch and strengthen your back muscles as your doctor or physical therapist recommends. If your doctor says it is okay to do them, these exercises may help. ? Lie on your back with your knees bent. Gently pull one bent knee to your chest. Put that foot back on the floor, and then pull the other knee to your chest.  ? Do pelvic tilts. Lie on your back with your knees bent. Tighten your stomach muscles. Pull your belly button (navel) in and up toward your ribs. You should feel like your back is pressing to the floor and your hips and pelvis are slightly lifting off the floor. Hold for 6 seconds while breathing smoothly. ? Stand with your back flat against a wall. Slowly slide down until your knees are slightly bent. Hold for 10 seconds, then slide back up the wall. · Remove or change anything in your house that may cause you to fall. Keep walkways clear of clutter, electrical cords, and throw rugs. When should you call for help? Call 911 anytime you think you may need emergency care. For example, call if:    · You are unable to move a leg at all. Call your doctor now or seek immediate medical care if:    · You have new or worse symptoms in your legs, belly, or buttocks. Symptoms may include:  ? Numbness or tingling. ? Weakness. ? Pain.     · You lose bladder or bowel control. Watch closely for changes in your health, and be sure to contact your doctor if:    · You have a fever, lose weight, or don't feel well.     · You are not getting better as expected. Where can you learn more? Go to http://www.gray.com/  Enter X327 in the search box to learn more about \"Lumbar Spinal Stenosis: Care Instructions. \"  Current as of: July 1, 2021               Content Version: 13.2  © 3284-3499 [employer]. Care instructions adapted under license by [employer] (which disclaims liability or warranty for this information).  If you have questions about a medical condition or this instruction, always ask your healthcare professional. [place] [address] any warranty or liability for your use of this information.

## 2022-03-23 NOTE — LETTER
3/25/2022    Patient: Padma Tam   YOB: 1945   Date of Visit: 3/23/2022     Governor Prateek MD  [address] [place] [id number]  Via Fax: [phone]    Dear Governor Prateek MD,      Thank you for referring Ms. Cleveland Cheng to [facility] for evaluation. My notes for this consultation are attached. If you have questions, please do not hesitate to call me. I look forward to following your patient along with you.       Sincerely,    Tammy Collins MD

## 2022-03-23 NOTE — PROGRESS NOTES
[facility] [address].  [address], [address]  [address]  Phone: [phone]  Fax: [phone]        Coral Vuong  : 1945  PCP: Marcos Phan MD    NEW PATIENT EVALUATION      ASSESSMENT AND PLAN    Diagnoses and all orders for this visit:    1. Lumbar stenosis with neurogenic claudication  -     REFERRAL TO PHYSICAL THERAPY         1. Edith Hanley is a 76 y.o. female retired  w/symptomatic stenosis. Discussed options including PT, medication, injections, and sx. 2. Referral to Physical Therapy  3. Continue prn motrin and flexeril             HISTORY OF PRESENT ILLNESS  Edith Hanley is seen today in consultation for back and hip pain. She reports pain radiating into her right leg. The pain started after she fell on her tailbone while helping her  down the steps. Her pain is exacerbated with standing and walking. She cannot give an exact walking tolerance, but she is able to complete her grocery shopping and household activities. She has numbness and tingling in her right anterior thigh. Denies weakness BLE. She is not taking any medication for her pain. Denies persistent fevers, chills, weight changes, saddle paresthesias, and neurogenic bowel or bladder symptoms. Pain Assessment  3/23/2022   Location of Pain Back;Buttocks; Hip   Location Modifiers Left;Right   Severity of Pain 0   Quality of Pain Other (Comment)   Quality of Pain Comment just a discomfort   Duration of Pain Persistent   Frequency of Pain Intermittent   Aggravating Factors Walking   Limiting Behavior Yes   Relieving Factors Other (Comment)   Relieving Factors Comment sitting   Result of Injury No       Onset of pain: injury 2021      Investigations:   L MRI 2022: stenosis L3/4/5, FA L5-S1  Spine surgery consult: none    Treatments:  Physical therapy: no  Spinal injections: no  Spinal surgery- no  Beneficial medications: none  Failed medications: none    Work Status: retired. Former   Pertinent PMHx:  DM, HTN, CKD, . Visit Vitals  Pulse 91   Temp 97.4 °F (36.3 °C) (Temporal)   Ht 5' 2.5\" (1.588 m)   Wt 230 lb (104.3 kg)   SpO2 100% Comment: RA   BMI 41.40 kg/m²       PHYSICAL EXAM  LE strength intact  SLR negative  Negative Roll   Diminished sensation to light touch left lateral calf  No clonus      Past Medical History:   Diagnosis Date    Anemia NEC     BMI 39.0-39.9,adult 5/15/2017    Chest pain, atypical     CKD (chronic kidney disease)     Constipation     Diabetes ([address]     Ectopic pregnancy     Essential hypertension 2/11/2011    History of Holter monitoring 10/14/2013    Baseline sinus rhythm w/extremely infrequent PVCs, which are likely cause of patient's symptoms.  Hyperlipidemia     Hypertension     Menopause     Normal stress echo 09/24/2010    Normal study after maximal exercise. No symptoms. Ex time 6 min. EF 60%. Past Surgical History:   Procedure Laterality Date    COLONOSCOPY N/A 9/15/2016    COLONOSCOPYwith polypectomies performed by Edgar Keane MD at [facility]    COLONOSCOPY N/A 1/5/2021    COLONOSCOPY without Sedation and polypectomies performed by Eusebio Marcial MD at [facility]    HX HYSTERECTOMY           Current Outpatient Medications   Medication Sig Dispense Refill    cyanocobalamin 1,000 mcg tablet Take 1,000 mcg by mouth daily.  latanoprost (XALATAN) 0.005 % ophthalmic solution Administer 1 Drop to right eye nightly.  aspirin delayed-release 81 mg tablet Take 1 Tab by mouth daily. 90 Tab 0    insulin glargine (LANTUS SOLOSTAR U-100 INSULIN) 100 unit/mL (3 mL) inpn 50 units in the morning and 40 units at night (Patient taking differently: 40 units in the morning and 28 units at night) 20 Pen 1    diclofenac (VOLTAREN) 1 % gel Apply  to affected area four (4) times daily. Apply to knees and neck as needed.  100 g 1    ibuprofen (MOTRIN) 800 mg tablet Take 1 Tab by mouth every eight (8) hours as needed for Pain. 30 Tab 1    glucose blood VI test strips (PRECISION XTRA TEST) strip by Does Not Apply route See Admin Instructions. Test BG twice a day. Dx:  E11.9      glucose blood VI test strips (PRECISION XTRA TEST) strip Precision extra test strips. Test 2 times a day. Dx:  E11.9 200 Strip 3    insulin aspart (NOVOLOG) 100 unit/mL inpn 10 Units by SubCUTAneous route Before breakfast, lunch, and dinner. 5 Pen 0    rosuvastatin (CRESTOR) 5 mg tablet Take 1 Tab by mouth nightly. (Patient taking differently: Take 10 mg by mouth nightly.) 90 Tab 3    NIFEdipine ER (ADALAT CC) 30 mg ER tablet Take 2 Tabs by mouth daily. (Patient taking differently: Take 30 mg by mouth daily. ) 180 Tab 3    Insulin Needles, Disposable, (BD INSULIN PEN NEEDLE UF MINI) 31 gauge x 3/16\" ndle Use with Lantus Insulin injections twice a day 200 Pen Needle 3    Blood-Glucose Meter monitoring kit Free style lite glucose meter. Test blood sugar 2 times a day. Dx: E11.65 1 Kit 0    Lancets misc Lancets for Free Style lite glucose meter. Test blood sugar 2 times a day. Dx; E11.65 200 Each 3    vit A & D3 in cod liver oil (COD LIVER OIL) 4,000 unit-400 unit/5 mL Liqd Take  by mouth. 1 tablespoon daily      gabapentin (NEURONTIN) 300 mg capsule Take 300 mg by mouth nightly. (Patient not taking: Reported on 3/23/2022)      cyclobenzaprine (FLEXERIL) 5 mg tablet Take 1 Tab by mouth two (2) times daily as needed for Muscle Spasm(s). 30 Tab 0    oxyCODONE-acetaminophen (PERCOCET) 5-325 mg per tablet Take 1 tablet every 4-6 hours as needed for pain control. If you were instructed to try over the counter ibuprofen or tylenol, only take the percocet for pain not controlled with the over the counter medication. 12 Tab 0    OTHER Ketamine 10%/Gabapentin 6%/ Baclofen 2%/ Cyclobenzaprine 2%/ Lidocaine 2%/ Flurbiprofen 10%  Apply 1-2 Grams to the affected area 3-4 Times per day.   QTY-180 GM  REFILLS-5

## 2022-03-23 NOTE — PROGRESS NOTES
Cristina Alfredo presents today for   Chief Complaint   Patient presents with    Back Pain    Buttocks pain    Hip Pain     right       Is someone accompanying this pt? no    Is the patient using any DME equipment during OV? no    Depression Screening:  3 most recent PHQ Screens 4/30/2019   Little interest or pleasure in doing things Not at all   Feeling down, depressed, irritable, or hopeless Not at all   Total Score PHQ 2 0       Learning Assessment:  Learning Assessment 4/22/2015   PRIMARY LEARNER Patient   HIGHEST LEVEL OF EDUCATION - PRIMARY LEARNER  -   BARRIERS PRIMARY LEARNER -   CO-LEARNER CAREGIVER -   PRIMARY LANGUAGE ENGLISH   LEARNER PREFERENCE PRIMARY VIDEOS     DEMONSTRATION   ANSWERED BY patient   RELATIONSHIP SELF       Abuse Screening:  Abuse Screening Questionnaire 3/23/2022   Do you ever feel afraid of your partner? N   Are you in a relationship with someone who physically or mentally threatens you? N   Is it safe for you to go home? Y       Fall Risk  Fall Risk Assessment, last 12 mths 3/23/2022   Able to walk? Yes   Fall in past 12 months? 0   Do you feel unsteady? 0   Are you worried about falling 0   Number of falls in past 12 months -   Fall with injury? -         Coordination of Care:  1. Have you been to the ER, urgent care clinic since your last visit? no  Hospitalized since your last visit? no    2. Have you seen or consulted any other health care providers outside of the [address] [place] [address] since your last visit? Yes, pcp Include any pap smears or colon screening.  no

## 2022-04-01 ENCOUNTER — HOSPITAL ENCOUNTER (OUTPATIENT)
Dept: PHYSICAL THERAPY | Age: 77
Discharge: HOME | End: 2022-04-01
Attending: PHYSICAL MEDICINE & REHABILITATION
Payer: MEDICARE

## 2022-04-01 PROCEDURE — 97110 THERAPEUTIC EXERCISES: CPT

## 2022-04-01 PROCEDURE — 97535 SELF CARE MNGMENT TRAINING: CPT

## 2022-04-01 PROCEDURE — 97161 PT EVAL LOW COMPLEX 20 MIN: CPT

## 2022-04-01 NOTE — PROGRESS NOTES
PT DAILY TREATMENT NOTE/LUMBAR EVAL     Patient Name: Edith Nice  Date:2022  : 1945  [x]  Patient  Verified  Payor: [place] MEDICARE / Plan: VA MEDICARE PART A & B / Product Type: Medicare /    In time:310  Out time:348  Total Treatment Time (min): 38  Visit #: 1 of 8    Medicare/BCBS Only   Total Timed Codes (min):  23 1:1 Treatment Time:  23     Treatment Area: Spinal stenosis, lumbar region with neurogenic claudication [M48.062]  SUBJECTIVE  Pain Level (0-10 scale): 7  []constant [x]intermittent []improving []worsening []no change since onset  Worse with walking > sitting     Better when sitting   Any medication changes, allergies to medications, adverse drug reactions, diagnosis change, or new procedure performed?: [x] No    [] Yes (see summary sheet for update)  Subjective functional status/changes:     PLOF:I all areas of ADLs and activities, No AD use, able to tolerate household and community activities, drive  Limitations to PLOF: pain  Mechanism of Injury: insideous onset LBP , buttock and legs  Current symptoms/Complaints: 77 YO female diagnosed as above and with S/S consistent with above diagnosis presents to skilled outpatient PT [facility] LBP, buttock discomfort and pain that radiates to the bilateral posterior thighs.   Previous Treatment/Compliance: MD, MRI,   PMHx/Surgical Hx: DM, HTN, Hearing impaired  Work Hx: retired  Living Situation: lives in a house, not alone  Pt Goals: alleviate pain  Barriers: [x]pain []financial []time []transportation []other  Motivation: good   Substance use: []Alcohol []Tobacco []other:   FABQ Score: []low []elevate  Cognition: A & O x 3    Other:    OBJECTIVE/EXAMINATION  Domestic Life:Household and community activities  Activity/Recreational Limitations: Pain   Mobility: I  Self Care: I         15 min [x]Eval                  []Re-Eval       15 min Therapeutic Exercise:  [x] See flow sheet :   Rationale: increase ROM, increase strength and improve coordination to improve the patients ability to tolerate ADLS and activities    8 min Self care:  []  See flow sheet :   Rationale: POC, GOALS, HEP, FOTO, anatomy  to improve the patients ability to tolerate ADLs and activities            With   [] TE   [] TA   [] neuro   [] other: Patient Education: [x] Review HEP    [] Progressed/Changed HEP based on:   [] positioning   [] body mechanics   [] transfers   [] heat/ice application    [] other:      Other Objective/Functional Measures:      Physical Therapy Evaluation - Lumbar Spine ([facility])    SUBJECTIVE  Chief Complaint:as above    Mechanism of injury:as above    Symptoms:as above  Aggravated by:   [] Bending [] Sitting [] Standing [] Walking   [] Moving [] Cough [] Sneeze [] Valsalva   [] AM  [] PM  Lying:  [] sup   [] pro   [] sidelying   [] Other:     Eased by:    [] Bending [] Sitting [] Standing [] Walking   [] Moving [] AM  [] PM  Lying: [] sup  [] pro  [] sidelying   [] Other:     General Health:     Past History/Treatments:     Diagnostic Tests: [] Lab work [] X-rays    [] CT [] MRI     [] Other:  Results:    Functional Status  Prior level of function:as above  Present functional limitations:FOTO   What position do you sleep in?: SL mostly, at times prone    OBJECTIVE  Posture: mild FH and RS  Lateral Shift: [x] R    [] L     [] +  [] -  Kyphosis: [x] Increased [] Decreased   []  WNL  Lordosis:  [] Increased [x] Decreased   [] WNL  Pelvic symmetry: [] WNL    [] Other:    Gait:  [] Normal     [] Abnormal:    Active Movements: [] N/A   [] Too acute   [] Other:  ROM   AROM % PROM Comments:pain, area   Forward flexion 40-60 toes     Extension 20-30      SB right 20-30      SB left 20-30      Rotation right 5-10 50%     Rotation left 5-10 50%        Neuro Screen [] WNL  Myotome/Dermatome/Reflexes:  Comments:    Dural Mobility:  SLR Sitting: [] R    [] L    [] +    [] -  @ (degrees):           Supine: [] R    [] L    [] +    [] -  @ (degrees):   Slump Test: [x] R    [x] L    [] +    [x] -  @ (degrees):   Prone Knee Bend: [] R    [] L    [] +    [] -     Palpation  [] Min  [x] Mod  [] Severe    Location:TTP STC RIGHT > LEFT superior glut and  Piriformis  [x] Min  [] Mod  [] Severe    Location:TTP L4-5 and SIJ B   [] Min  [] Mod  [] Severe    Location:    Strength   L(0-5) R (0-5) N/T   Hip Flexion (L1,2) 4 4 []   Knee Extension (L3,4) 5 5 []   Ankle Dorsiflexion (L4)   []   Great Toe Extension (L5)   []   Ankle Plantarflexion (S1)   []   Knee Flexion (S1,2) 5 5 []   Upper Abdominals   []   Lower Abdominals   []   Paraspinals   []   Back Rotators   []   Gluteus Teodoro   []   Hip abd/add 5/5 5/5 []     Special Tests  Lumbar:  Lumb.  Compression: [] Pos  [] Neg               Lumbar Distraction:   [] Pos  [] Neg    Quadrant:  [] Pos  [] Neg   [] Flex  [] Ext    Sacroilliac:  Gaenslen's: [] R    [] L    [] +    [] -     Compression: [] +    [] -     Gapping:  [] +    [] -     Thigh Thrust: [] R    [] L    [] +    [] -     Leg Length: [] +    [] -   Position:    Crests:    ASIS:    PSIS:    Sacral Sulcus:    Mobility: Standing flex:     Sitting flex:     Supine to sit:     Prone knee bend:         Hip: Fleurette Adas:  [] R    [] L    [] +    [] -     Scour:  [] R    [] L    [] +    [] -     Piriformis: [x] R    [x] L    [x] +    [] -          Deficits: Ephraim's: [] R    [] L    [] +    [] -     Kirill: [] R    [] L    [] +    [] -     Hamstrings 90/90:    Gastrocsoleus (to neutral): Right: Left:       Global Muscular Weakness:  Abdominals:X  Quadratus Lumborum:X  Paraspinals:X  Other:    Other tests/comments:       Pain Level (0-10 scale) post treatment: 7    ASSESSMENT/Changes in Function: Patient demonstrates the potential to make gains with improved ROM, strength, endurance/activity tolerance, functional FOTO survey score   and all within a reasonable time frame so as to increase their functional independence with ADLs and activities for carryover to improved quality of life, tolerance to household and community activities. Patient requires skilled Physical Therapy so as to monitor their response to and modify their treatment plan accordingly. Patient appears to be an appropriate candidate for skilled outpatient Physical Therapy. Patient will continue to benefit from skilled PT services to modify and progress therapeutic interventions, address functional mobility deficits, address ROM deficits, address strength deficits, analyze and address soft tissue restrictions, analyze and cue movement patterns, analyze and modify body mechanics/ergonomics, assess and modify postural abnormalities and instruct in home and community integration to attain remaining goals.      [x]  See Plan of Care  []  See progress note/recertification  []  See Discharge Summary         Progress towards goals / Updated goals:       PLAN  [x]  Upgrade activities as tolerated     [x]  Continue plan of care  []  Update interventions per flow sheet       []  Discharge due to:_  []  Other:_      Damon Reid, PT 4/1/2022  3:06 PM

## 2022-04-01 NOTE — PROGRESS NOTES
In [address] [place] [address], [address] [place] [address], [address] [place], [place] [address]y [address],[address]  [phone] fax      Plan of Care/ Statement of Necessity for Physical Therapy Services    Patient name: Adrianna Velasquez Start of Care: 2022   Referral source: Citlalli Perez MD : 1945    Medical Diagnosis: Spinal stenosis, lumbar region with neurogenic claudication [M48.062]  Payor: VA MEDICARE / Plan: VA MEDICARE PART A & B / Product Type: Medicare /  Onset Date:a few months    Treatment Diagnosis:LBP   Prior Hospitalization: see medical history Provider#: [id number]   Medications: Verified on Patient summary List    Comorbidities: DM, HTN, Hearing impaired   Prior Level of Function: :I all areas of ADLs and activities, No AD use, able to tolerate household and community activities      The Plan of Care and following information is based on the information from the initial evaluation. Assessment/ key information: 75 YO female diagnosed as above and with S/S consistent with above diagnosis presents to skilled outpatient PT [facility] LBP, buttock discomfort and pain that radiates to the bilateral posterior thighs. She has decrease trunk ROM and decrease core strength, decrease LE strength, B LE S/S with standing and walking, decrease tolerance to ADLs and activities. Patient demonstrates the potential to make gains with improved ROM, strength, endurance/activity tolerance, functional FOTO survey score   and all within a reasonable time frame so as to increase their functional independence with ADLs and activities for carryover to improved quality of life, tolerance to household and community activities. Patient requires skilled Physical Therapy so as to monitor their response to and modify their treatment plan accordingly.  Patient appears to be an appropriate candidate for skilled outpatient Physical Therapy  Evaluation Complexity History MEDIUM  Complexity : 1-2 comorbidities / personal factors will impact the outcome/ POC ; Examination MEDIUM Complexity : 3 Standardized tests and measures addressing body structure, function, activity limitation and / or participation in recreation  ;Presentation LOW Complexity : Stable, uncomplicated  ;Clinical Decision Making   Overall Complexity Rating: LOW   Problem List: pain affecting function, decrease ROM, decrease strength, edema affecting function, impaired gait/ balance, decrease ADL/ functional abilitiies, decrease activity tolerance, decrease flexibility/ joint mobility and decrease transfer abilities   Treatment Plan may include any combination of the following: Therapeutic exercise, Therapeutic activities, Neuromuscular re-education, Physical agent/modality, Gait/balance training, Manual therapy, Patient education, Self Care training, Functional mobility training, Home safety training and Stair training  Patient / Family readiness to learn indicated by: asking questions, trying to perform skills and interest  Persons(s) to be included in education: patient (P)  Barriers to Learning/Limitations: None  Patient Goal (s):  alleviate pain  Patient Self Reported Health Status: fair  Rehabilitation Potential: good    Short Term Goals: To be accomplished in 4 treatments:   1 patient will have established and be I with HEP to aid with I and self management at discharge   EVAL issued HEP   2 patient will have pain 5/10 to aid with increase tolerance to light activities at home   EVAL 7  Long Term Goals:  To be accomplished in 8 treatments:   1  patient will have pain 3/10 to aid with increase tolerance to moderate activities at home   EVAL 7   2 patient will have FOTO improved to projected gains to aid with increase tolerance to ADLS and activities at home and in the community   EVAL TBA   3 patient will have tolerance to walking 15 -20 minutes to aid with increase tolerance to ADSL and activities at home   EVAL increase pain and LE S/S with walking       Frequency / Duration: Patient to be seen 2 times per week for 4 weeks. Patient/ Caregiver education and instruction: Diagnosis, prognosis, self care, activity modification and exercises   [x]  Plan of care has been reviewed with PTA    Certification Period: 4/1/22- 4/30/22  Sly Quevedo, PT 4/1/2022 3:07 PM    ________________________________________________________________________    I certify that the above Therapy Services are being furnished while the patient is under my care. I agree with the treatment plan and certify that this therapy is necessary.     [de-identified] Signature:____________Date:_________TIME:________     Leonard Lara MD  ** Signature, Date and Time must be completed for valid certification **    Please sign and return to In [address] [place] [address] [address]  [address] [place] [address], [address] [place] [address], [id number] [place] [address],[facility] [address]  [phone] fax

## 2022-04-06 ENCOUNTER — HOSPITAL ENCOUNTER (OUTPATIENT)
Dept: PHYSICAL THERAPY | Age: 77
Discharge: HOME | End: 2022-04-06
Attending: PHYSICAL MEDICINE & REHABILITATION
Payer: MEDICARE

## 2022-04-06 PROCEDURE — 97530 THERAPEUTIC ACTIVITIES: CPT

## 2022-04-06 PROCEDURE — 97110 THERAPEUTIC EXERCISES: CPT

## 2022-04-06 NOTE — PROGRESS NOTES
PT DAILY TREATMENT NOTE     Patient Name: Carmen Pritchard  Date:2022  : 1945  [x]  Patient  Verified  Payor: [place] MEDICARE / Plan: [place] MEDICARE PART A & B / Product Type: Medicare /    In time: 11:22  Out time:12:15  Total Treatment Time (min): 53  Visit #:2 of 8    Medicare/BCBS Only   Total Timed Codes (min):  43 1:1 Treatment Time: 43        Treatment Area: Other low back pain [M54.59]    SUBJECTIVE  Pain Level (0-10 scale): 1  Any medication changes, allergies to medications, adverse drug reactions, diagnosis change, or new procedure performed?: [x] No    [] Yes (see summary sheet for update)  Subjective functional status/changes:   [] No changes reported  \"Im feeling better. \"    OBJECTIVE    Modality rationale: decrease edema, decrease inflammation, decrease pain, increase tissue extensibility and increase muscle contraction/control to improve the patients ability to perform ADL    Min Type Additional Details    [] Estim:  []Unatt       []IFC  []Premod                        []Other:  []w/ice   []w/heat  Position:  Location:    [] Estim: []Att    []TENS instruct  []NMES                    []Other:  []w/US   []w/ice   []w/heat  Position:  Location:    []  Traction: [] Cervical       []Lumbar                       [] Prone          []Supine                       []Intermittent   []Continuous Lbs:  [] before manual  [] after manual    []  Ultrasound: []Continuous   [] Pulsed                           []1MHz   []3MHz W/cm2:  Location:    []  Iontophoresis with dexamethasone         Location: [] Take home patch   [] In clinic   10 []  Ice     [x]  heat  []  Ice massage  []  Laser   []  Anodyne Position:long sit  Location:(B) LSP    []  Laser with stim  []  Other:  Position:  Location:    []  Vasopneumatic Device    []  Right     []  Left  Pre-treatment girth:  Post-treatment girth:  Measured at (location):  Pressure:       [] lo [] med [] hi   Temperature: [] lo [] med [] hi   [x] Skin assessment post-treatment:  [x]intact []redness- no adverse reaction    []redness  adverse reaction:      min []Eval                  []Re-Eval       23 min Therapeutic Exercise:  [x] See flow sheet :   Rationale: increase ROM, increase strength and improve coordination to improve the patients ability to perform ADL     20 min Therapeutic Activity:  [x]  See flow sheet :   Rationale: increase ROM, increase strength and improve coordination  to improve the patients ability to perform ADL       min Neuromuscular Re-education:  []  See flow sheet :   Rationale: increase ROM, increase strength and improve coordination  to improve the patients ability to perform ADL      min Manual Therapy: The manual therapy interventions were performed at a separate and distinct time from the therapeutic activities interventions. Rationale: decrease pain, increase ROM, increase tissue extensibility, decrease edema , decrease trigger points and increase postural awareness to perform ADL      min Gait Training:  ___ feet with ___ device on level surfaces with ___ level of assist   Rationale: With   [] TE   [x] TA   [] neuro   [] other: Patient Education: [x] Review HEP    [] Progressed/Changed HEP based on:   [] positioning   [] body mechanics   [] transfers   [] heat/ice application    [x] other:   Completed FOTO 41     Other Objective/Functional Measures:  FOTO 41    Pain Level (0-10 scale) post treatment: 0    ASSESSMENT/Changes in Function: Pt completed each there ex  Fairly well with cues. Pt required increased time on completing FOTO. Patient will continue to benefit from skilled PT services to address functional mobility deficits, address ROM deficits, address strength deficits, analyze and address soft tissue restrictions, analyze and cue movement patterns, analyze and modify body mechanics/ergonomics, assess and modify postural abnormalities and instruct in home and community integration to attain remaining goals. [x]  See Plan of Care  []  See progress note/recertification  []  See Discharge Summary         Progress towards goals / Updated goals:   1 patient will have established and be I with HEP to aid with I and self management at discharge              EVAL issued HEP              2 patient will have pain 5/10 to aid with increase tolerance to light activities at home              EVAL 7  Long Term Goals:  To be accomplished in 8 treatments:              1  patient will have pain 3/10 to aid with increase tolerance to moderate activities at home              EVAL 7              2 patient will have FOTO improved to projected gains to aid with increase tolerance to ADLS and activities at home and in the community              EVAL TBA              3 patient will have tolerance to walking 15 -20 minutes to aid with increase tolerance to ADSL and activities at home              EVAL increase pain and LE S/S with walking    PLAN  []  Upgrade activities as tolerated     []  Continue plan of care  []  Update interventions per flow sheet       []  Discharge due to:_  []  Other:_      Letty Medrano, PTA 4/6/2022  11:27 AM    Future Appointments   Date Time Provider Jose Morales   4/13/2022  2:15 PM Vinnie Horton, PTA [facility]   4/15/2022  4:30 PM Michell Bijou, PTA [facility]   4/19/2022  3:00 PM Michell Bijou, PTA [facility]   4/21/2022  5:15 PM Vinnie Horton, PTA [facility]   4/26/2022  3:00 PM Michell Bijou, PTA [facility]   4/28/2022  3:00 PM Michell Bijou, PTA [facility]   5/18/2022  2:45 PM Sadie Savage MD [facility]

## 2022-04-13 ENCOUNTER — HOSPITAL ENCOUNTER (OUTPATIENT)
Dept: PHYSICAL THERAPY | Age: 77
Discharge: HOME | End: 2022-04-13
Attending: PHYSICAL MEDICINE & REHABILITATION
Payer: MEDICARE

## 2022-04-13 PROCEDURE — 97110 THERAPEUTIC EXERCISES: CPT

## 2022-04-13 NOTE — PROGRESS NOTES
PT DAILY TREATMENT NOTE     Patient Name: Giovani Siegel  RTDQ:[id number]  : 1945  [x]  Patient  Verified  Payor: Bell Luong / Plan: [place] MEDICARE PART A & B / Product Type: Medicare /    In time:2:32  Out time:3:12  Total Treatment Time (min):  40  Visit #: 3 of 8    Medicare/BCBS Only   Total Timed Codes (min): 30 1:1 Treatment Time: 30       Treatment Area: Other low back pain [M54.59]    SUBJECTIVE  Pain Level (0-10 scale):0  Any medication changes, allergies to medications, adverse drug reactions, diagnosis change, or new procedure performed?: [x] No    [] Yes (see summary sheet for update)  Subjective functional status/changes:   [] No changes reported  \"No pain. \" \"Just tired. \"    OBJECTIVE    Modality rationale: decrease edema, decrease inflammation, decrease pain, increase tissue extensibility and increase muscle contraction/control to improve the patients ability to perform ADL    Min Type Additional Details    [] Estim:  []Unatt       []IFC  []Premod                        []Other:  []w/ice   []w/heat  Position:  Location:    [] Estim: []Att    []TENS instruct  []NMES                    []Other:  []w/US   []w/ice   []w/heat  Position:  Location:    []  Traction: [] Cervical       []Lumbar                       [] Prone          []Supine                       []Intermittent   []Continuous Lbs:  [] before manual  [] after manual    []  Ultrasound: []Continuous   [] Pulsed                           []1MHz   []3MHz W/cm2:  Location:    []  Iontophoresis with dexamethasone         Location: [] Take home patch   [] In clinic   10 []  Ice     [x]  heat  []  Ice massage  []  Laser   []  Anodyne Position:long sit  Location:(B) LSP    []  Laser with stim  []  Other:  Position:  Location:    []  Vasopneumatic Device    []  Right     []  Left  Pre-treatment girth:  Post-treatment girth:  Measured at (location):  Pressure:       [] lo [] med [] hi   Temperature: [] lo [] med [] hi   [x] Skin assessment post-treatment:  [x]intact []redness- no adverse reaction    []redness  adverse reaction:      min []Eval                  []Re-Eval       30 min Therapeutic Exercise:  [x] See flow sheet :   Rationale: increase ROM, increase strength, improve coordination and increase proprioception to improve the patients ability to perform ADL      min Therapeutic Activity:  []  See flow sheet :   Rationale: increase ROM, increase strength and improve coordination  to improve the patients ability to perform ADL       min Neuromuscular Re-education:  []  See flow sheet :   Rationale: increase ROM, increase strength, improve coordination, improve balance and increase proprioception  to improve the patients ability to perform ADL      min Manual Therapy: The manual therapy interventions were performed at a separate and distinct time from the therapeutic activities interventions. Rationale: decrease pain, increase ROM, increase tissue extensibility, decrease edema , decrease trigger points and increase postural awareness to perform ADL      min Gait Training:  ___ feet with ___ device on level surfaces with ___ level of assist   Rationale: With   [] TE   [] TA   [] neuro   [] other: Patient Education: [x] Review HEP    [] Progressed/Changed HEP based on:   [] positioning   [] body mechanics   [] transfers   [] heat/ice application    [] other:      Other Objective/Functional Measures:      Pain Level (0-10 scale) post treatment: 0    ASSESSMENT/Changes in Function: Pt completed each there ex  fairly well. Pt is progressing towards all goals.   Patient will continue to benefit from skilled PT services to address functional mobility deficits, address ROM deficits, address strength deficits, analyze and address soft tissue restrictions, analyze and cue movement patterns, analyze and modify body mechanics/ergonomics, assess and modify postural abnormalities and instruct in home and community integration to attain remaining goals.      [x]  See Plan of Care  []  See progress note/recertification  []  See Discharge Summary         Progress towards goals / Updated goals:   1 patient will have established and be I with HEP to aid with I and self management at discharge              EVAL issued HEP  Current:  1xday  4/13/22              2 patient will have pain 5/10 to aid with increase tolerance to light activities at home              EVAL [id number] [facility] be accomplished in 8 treatments:              1  patient will have pain 3/10 to aid with increase tolerance to moderate activities at home              EVAL 7              2 patient will have FOTO improved to projected gains to aid with increase tolerance to ADLS and activities at home and in the community              EVAL TBA              3 patient will have tolerance to walking 15 -20 minutes to aid with increase tolerance to ADSL and activities at home              EVAL increase pain and LE S/S with walking    PLAN  []  Upgrade activities as tolerated     [x]  Continue plan of care  []  Update interventions per flow sheet       []  Discharge due to:_  []  Other:_      Miranda Madison, PTA 4/13/2022  2:44 PM    Future Appointments   Date Time Provider Jose Morales   4/15/2022  4:30 PM Catherene Cleverly, PTA [facility]   4/19/2022  3:00 PM Catherene Cleverly, PTA [facility]   4/21/2022  5:15 PM Les Sims, PTA [facility]   4/26/2022  3:00 PM Catherene Cleverly, PTA [facility]   4/28/2022  3:00 PM Catherene Cleverly, PTA [facility]   5/18/2022  2:45 PM Bri Mackey MD [facility]

## 2022-04-15 ENCOUNTER — HOSPITAL ENCOUNTER (OUTPATIENT)
Dept: PHYSICAL THERAPY | Age: 77
Discharge: HOME | End: 2022-04-15
Attending: PHYSICAL MEDICINE & REHABILITATION
Payer: MEDICARE

## 2022-04-15 PROCEDURE — 97110 THERAPEUTIC EXERCISES: CPT

## 2022-04-15 NOTE — PROGRESS NOTES
PT DAILY TREATMENT NOTE     Patient Name: Ivett Alberts  FXIP:  : 1945  [x]  Patient  Verified  Payor: Joanie Gutierrez / Plan: [place] MEDICARE PART A & B / Product Type: Medicare /    In time: 440 pm  Out time: 525 pm   Total Treatment Time (min): 45  Visit #: 4 of 8    Medicare/BCBS Only   Total Timed Codes (min):  35 1:1 Treatment Time:  35       Treatment Area: Other low back pain [M54.59]    SUBJECTIVE  Pain Level (0-10 scale): 0/10   Any medication changes, allergies to medications, adverse drug reactions, diagnosis change, or new procedure performed?: [x] No    [] Yes (see summary sheet for update)  Subjective functional status/changes:   [] No changes reported  Patient reports that her back is feeling okay today. Patient states that she can still feel it in her buttock some. OBJECTIVE    Modality rationale: decrease pain and increase tissue extensibility to improve the patients ability to assist with performing ADL's and functional tasks without limitations.    Min Type Additional Details    [] Estim:  []Unatt       []IFC  []Premod                        []Other:  []w/ice   []w/heat  Position:  Location:    [] Estim: []Att    []TENS instruct  []NMES                    []Other:  []w/US   []w/ice   []w/heat  Position:  Location:    []  Traction: [] Cervical       []Lumbar                       [] Prone          []Supine                       []Intermittent   []Continuous Lbs:  [] before manual  [] after manual    []  Ultrasound: []Continuous   [] Pulsed                           []1MHz   []3MHz W/cm2:  Location:    []  Iontophoresis with dexamethasone         Location: [] Take home patch   [] In clinic   10 []  Ice     [x]  heat  []  Ice massage  []  Laser   []  Anodyne Position:reclined   Location:L/S    []  Laser with stim  []  Other:  Position:  Location:    []  Vasopneumatic Device    []  Right     []  Left  Pre-treatment girth:  Post-treatment girth:  Measured at (location): Pressure:       [] lo [] med [] hi   Temperature: [] lo [] med [] hi   [] Skin assessment post-treatment:  []intact []redness- no adverse reaction  []redness  adverse reaction:     35 min Therapeutic Exercise:  [x] See flow sheet :   Rationale: increase ROM and increase strength to improve the patients overall muscle endurance and activity tolerance for ADL's and functional tasks. min Therapeutic Activity:  [x]  See flow sheet :   Rationale: safely perform dynamic activities and to improve functional performance of  to improve the patients ability to safely perform dynamic activities and to improve functional performance of ADL's.       min Neuromuscular Re-education:  []  See flow sheet :   Rationale: increase strength, improve coordination, improve balance and increase proprioception  to improve the patients ability to perform activities with good form, stability and proprioception. With   [] TE   [] TA   [] neuro   [] other: Patient Education: [x] Review HEP    [] Progressed/Changed HEP based on:   [] positioning   [] body mechanics   [] transfers   [] heat/ice application    [] other:      Other Objective/Functional Measures:   LTR and TA bracing     Pain Level (0-10 scale) post treatment: 0/10     ASSESSMENT/Changes in Function:   Patient is progressing well towards goals. Progressed exercises, as per flow sheet, to assist with increasing core and low back strength. Patient responded well to today's session, as evident by, no increase in low back pain and improved exercise tolerance.  Patient will continue to benefit from skilled PT services to modify and progress therapeutic interventions, address functional mobility deficits, address ROM deficits, address strength deficits, analyze and address soft tissue restrictions, analyze and cue movement patterns, analyze and modify body mechanics/ergonomics, assess and modify postural abnormalities and instruct in home and community integration to attain remaining goals. []  See Plan of Care  []  See progress note/recertification  []  See Discharge Summary         Progress towards goals / Updated goals:  1 patient will have established and be I with HEP to aid with I and self management at discharge              EVAL issued HEP     Current:  1xday  4/13/22              2 patient will have pain 5/10 to aid with increase tolerance to light activities at home              EVAL 7   Current: Patient reports 0/10 pain today. (4/15/2022)   Long Term Goals: To be accomplished in 8 treatments:              1  patient will have pain 3/10 to aid with increase tolerance to moderate activities at home              EVAL 7   Current: Patient reports 0/10 pain today. (4/15/2022)               2 patient will have FOTO improved to projected gains to aid with increase tolerance to ADLS and activities at home and in the community              EVAL TBA   Current: Progressing, will assess at next progress note.  (4/15/2022)               3 patient will have tolerance to walking 15 -20 minutes to aid with increase tolerance to ADL's and activities at home              EVAL increase pain and LE S/S with walking   Current: Progressing (4/15/2022)     PLAN  [x]  Upgrade activities as tolerated     [x]  Continue plan of care  []  Update interventions per flow sheet       []  Discharge due to:_  []  Other:_      Angeles Villarreal PTA 4/15/2022  4:58 PM    Future Appointments   Date Time Provider oJse Morales   4/19/2022  3:00 PM Uzair Miller PTA [facility]   4/21/2022  5:15 PM Gerardo Sher PTA [facility]   4/26/2022  3:00 PM Uzair Miller PTA [facility]   4/28/2022  3:00 PM Uzair Miller PTA [facility]   5/18/2022  2:45 PM Cierra Aguirre MD [facility]

## 2022-04-19 ENCOUNTER — HOSPITAL ENCOUNTER (OUTPATIENT)
Dept: PHYSICAL THERAPY | Age: 77
Discharge: HOME | End: 2022-04-19
Attending: PHYSICAL MEDICINE & REHABILITATION
Payer: MEDICARE

## 2022-04-19 ENCOUNTER — TELEPHONE (OUTPATIENT)
Dept: PHYSICAL THERAPY | Age: 77
End: 2022-04-19

## 2022-04-19 PROCEDURE — 97112 NEUROMUSCULAR REEDUCATION: CPT

## 2022-04-19 PROCEDURE — 97110 THERAPEUTIC EXERCISES: CPT

## 2022-04-19 NOTE — PROGRESS NOTES
PT DAILY TREATMENT NOTE     Patient Name: Jonathan Ferguson  KNLB:  : 1945  [x]  Patient  Verified  Payor: Dieter Mon / Plan: [place] MEDICARE PART A & B / Product Type: Medicare /    In time: 310 pm  Out time: 400 pm   Total Treatment Time (min): 50   Visit #: 5 of 8    Medicare/BCBS Only   Total Timed Codes (min): 40  1:1 Treatment Time:  40       Treatment Area: Other low back pain [M54.59]    SUBJECTIVE  Pain Level (0-10 scale): 0/10   Any medication changes, allergies to medications, adverse drug reactions, diagnosis change, or new procedure performed?: [x] No    [] Yes (see summary sheet for update)  Subjective functional status/changes:   [] No changes reported  Patient reports that her back is feeling good today. OBJECTIVE    Modality rationale: decrease pain and increase tissue extensibility to improve the patients ability to assist with performing ADL's and functional tasks without limitations.    Min Type Additional Details    [] Estim:  []Unatt       []IFC  []Premod                        []Other:  []w/ice   []w/heat  Position:  Location:    [] Estim: []Att    []TENS instruct  []NMES                    []Other:  []w/US   []w/ice   []w/heat  Position:  Location:    []  Traction: [] Cervical       []Lumbar                       [] Prone          []Supine                       []Intermittent   []Continuous Lbs:  [] before manual  [] after manual    []  Ultrasound: []Continuous   [] Pulsed                           []1MHz   []3MHz W/cm2:  Location:    []  Iontophoresis with dexamethasone         Location: [] Take home patch   [] In clinic   10 []  Ice     [x]  heat  []  Ice massage  []  Laser   []  Anodyne Position:reclined   Location:L/S    []  Laser with stim  []  Other:  Position:  Location:    []  Vasopneumatic Device    []  Right     []  Left  Pre-treatment girth:  Post-treatment girth:  Measured at (location):  Pressure:       [] lo [] med [] hi   Temperature: [] lo [] med [] hi [] Skin assessment post-treatment:  []intact []redness- no adverse reaction  []redness  adverse reaction:     30 min Therapeutic Exercise:  [x] See flow sheet :   Rationale: increase ROM and increase strength to improve the patients overall muscle endurance and activity tolerance for ADL's and functional tasks. min Therapeutic Activity:  [x]  See flow sheet :   Rationale: safely perform dynamic activities and to improve functional performance of  to improve the patients ability to safely perform dynamic activities and to improve functional performance of ADL's. 10 min Neuromuscular Re-education:  []  See flow sheet :   Rationale: increase strength, improve coordination, improve balance and increase proprioception  to improve the patients ability to perform activities with good form, stability and proprioception. With   [] TE   [] TA   [] neuro   [] other: Patient Education: [x] Review HEP    [] Progressed/Changed HEP based on:   [] positioning   [] body mechanics   [] transfers   [] heat/ice application    [] other:      Other Objective/Functional Measures: Added seated piriformis stretch     Pain Level (0-10 scale) post treatment: 2/10 (discomfort)     ASSESSMENT/Changes in Function:   Patient is progressing well towards goals. Progressed exercises, as per flow sheet, to assist with increasing low back flexibility. Patient responded well to today's session, as evident by, no increase in low back pain. Patient will continue to benefit from skilled PT services to modify and progress therapeutic interventions, address functional mobility deficits, address ROM deficits, address strength deficits, analyze and address soft tissue restrictions, analyze and cue movement patterns, analyze and modify body mechanics/ergonomics, assess and modify postural abnormalities and instruct in home and community integration to attain remaining goals.      []  See Plan of Care  []  See progress note/recertification  []  See Discharge Summary         Progress towards goals / Updated goals:  1 patient will have established and be I with HEP to aid with I and self management at discharge              EVAL issued HEP     Current:  1xday  4/19/22              2 patient will have pain 5/10 to aid with increase tolerance to light activities at home              EVAL 7   Current: Patient reports 2/10 pain today. (4/19/2022)   Long Term Goals: To be accomplished in 8 treatments:              1  patient will have pain 3/10 to aid with increase tolerance to moderate activities at home              EVAL 7   Current: Patient reports 2/10 pain today. (4/19/2022)               2 patient will have FOTO improved to projected gains to aid with increase tolerance to ADLS and activities at home and in the community              EVAL TBA   Current: Progressing, will assess at next progress note.  (4/19/2022)               3 patient will have tolerance to walking 15 -20 minutes to aid with increase tolerance to ADL's and activities at home              EVAL increase pain and LE S/S with walking   Current: Progressing (4/19/2022)     PLAN  [x]  Upgrade activities as tolerated     [x]  Continue plan of care  []  Update interventions per flow sheet       []  Discharge due to:_  []  Other:_      Negrita Arenas PTA 4/19/2022  4:58 PM    Future Appointments   Date Time Provider Jose Morales   4/21/2022  5:15 PM Juanita Sims PTA [facility]   4/26/2022  3:00 PM Nadeen Sims PTA [facility]   4/28/2022  3:00 PM Nadeen Sims PTA [facility]   5/18/2022  2:45 PM Kaye Elena MD [facility]

## 2022-04-21 ENCOUNTER — HOSPITAL ENCOUNTER (OUTPATIENT)
Dept: PHYSICAL THERAPY | Age: 77
Discharge: HOME | End: 2022-04-21
Attending: PHYSICAL MEDICINE & REHABILITATION
Payer: MEDICARE

## 2022-04-21 PROCEDURE — 97110 THERAPEUTIC EXERCISES: CPT

## 2022-04-21 PROCEDURE — 97530 THERAPEUTIC ACTIVITIES: CPT

## 2022-04-21 PROCEDURE — 97140 MANUAL THERAPY 1/> REGIONS: CPT

## 2022-04-21 NOTE — PROGRESS NOTES
PT DAILY TREATMENT NOTE     Patient Name: Daniel Mays  Date:2022  : 1945  [x]  Patient  Verified  Payor: [address] [place] [address] / Plan: [place] & B / Product Type: Medicare /    In time:5:15  Out time:6:13  Total Treatment Time (min): 58  Visit #: 6 of 8    Medicare/BCBS Only   Total Timed Codes (min):  48 1:1 Treatment Time:  48       Treatment Area: Other low back pain [M54.59]    SUBJECTIVE  Pain Level (0-10 scale): 6  Any medication changes, allergies to medications, adverse drug reactions, diagnosis change, or new procedure performed?: [x] No    [] Yes (see summary sheet for update)  Subjective functional status/changes:   [] No changes reported  \"I have some pain today. \"    OBJECTIVE    Modality rationale: decrease edema, decrease inflammation, decrease pain, increase tissue extensibility and increase muscle contraction/control to improve the patients ability to perform ADL    Min Type Additional Details    [] Estim:  []Unatt       []IFC  []Premod                        []Other:  []w/ice   []w/heat  Position:  Location:    [] Estim: []Att    []TENS instruct  []NMES                    []Other:  []w/US   []w/ice   []w/heat  Position:  Location:    []  Traction: [] Cervical       []Lumbar                       [] Prone          []Supine                       []Intermittent   []Continuous Lbs:  [] before manual  [] after manual    []  Ultrasound: []Continuous   [] Pulsed                           []1MHz   []3MHz W/cm2:  Location:    []  Iontophoresis with dexamethasone         Location: [] Take home patch   [] In clinic   10 []  Ice     [x]  heat  []  Ice massage  []  Laser   []  Anodyne Position:long sit  Location:(B) LSP    []  Laser with stim  []  Other:  Position:  Location:    []  Vasopneumatic Device    []  Right     []  Left  Pre-treatment girth:  Post-treatment girth:  Measured at (location):  Pressure:       [] lo [] med [] hi   Temperature: [] lo [] med [] hi   [x] Skin assessment post-treatment:  [x]intact []redness- no adverse reaction    []redness  adverse reaction:      min []Eval                  []Re-Eval       30 min Therapeutic Exercise:  [x] See flow sheet :   Rationale: increase ROM, increase strength and improve coordination to improve the patients ability to perform ADL     10 min Therapeutic Activity:  [x]  See flow sheet :   Rationale: increase ROM, increase strength and improve coordination  to improve the patients ability to perform ADL       min Neuromuscular Re-education:  []  See flow sheet :   Rationale: increase ROM, increase strength and improve coordination  to improve the patients ability to perform ADL     8 min Manual Therapy:  Checked alignment; P/A mob/sacrum mob/passive stretch (B) piriformis prone   The manual therapy interventions were performed at a separate and distinct time from the therapeutic activities interventions. Rationale: decrease pain, increase ROM, increase tissue extensibility, decrease edema , decrease trigger points and increase postural awareness to perform ADL      min Gait Training:  ___ feet with ___ device on level surfaces with ___ level of assist   Rationale: With   [x] TE   [x] TA   [] neuro   [] other: Patient Education: [x] Review HEP    [] Progressed/Changed HEP based on:   [] positioning   [] body mechanics   [] transfers   [] heat/ice application    [x] other: reviewed piriformis stretch     Other Objective/Functional Measures:  (B) innominate symmetrical    Pain Level (0-10 scale) post treatment:2    ASSESSMENT/Changes in Function: Pt presents with tightness at (B) piriformis during manual. Therapist discussed with pt on importance of performing piriformis stretch at home. Pt understood.     Patient will continue to benefit from skilled PT services to address functional mobility deficits, address ROM deficits, address strength deficits, analyze and address soft tissue restrictions, analyze and cue movement patterns, analyze and modify body mechanics/ergonomics, assess and modify postural abnormalities and instruct in home and community integration to attain remaining goals. [x]  See Plan of Care  []  See progress note/recertification  []  See Discharge Summary         Progress towards goals / Updated goals:  1 patient will have established and be I with HEP to aid with I and self management at discharge              EVAL issued HEP                Current:  1xday  4/19/22              2 patient will have pain 5/10 to aid with increase tolerance to light activities at home              EVAL 7              Current: Patient reports 2/10 pain today. (4/19/2022)   Long Term Goals: To be accomplished in 8 treatments:              1  patient will have pain 3/10 to aid with increase tolerance to moderate activities at home              EVAL 7              Current: Patient reports 2/10 pain today. (4/19/2022)               2 patient will have FOTO improved to projected gains to aid with increase tolerance to ADLS and activities at home and in the community              EVAL TBA              Current: Progressing, will assess at next progress note.  (4/19/2022)               3 patient will have tolerance to walking 15 -20 minutes to aid with increase tolerance to ADL's and activities at home              EVAL increase pain and LE S/S with walking              Current: Progressing (4/19/2022)     PLAN  []  Upgrade activities as tolerated     [x]  Continue plan of care  []  Update interventions per flow sheet       []  Discharge due to:_  []  Other:_      Mariajose Aguilar PTA 4/21/2022  5:47 PM    Future Appointments   Date Time Provider Jose Morales   4/26/2022  3:00 PM [facility]   4/28/2022  3:00 PM [facility]IR [facility]   5/18/2022  2:45 PM Bri Lindsey MD [facility]

## 2022-04-26 ENCOUNTER — HOSPITAL ENCOUNTER (OUTPATIENT)
Dept: PHYSICAL THERAPY | Age: 77
Discharge: HOME | End: 2022-04-26
Attending: PHYSICAL MEDICINE & REHABILITATION
Payer: MEDICARE

## 2022-04-26 PROCEDURE — 97112 NEUROMUSCULAR REEDUCATION: CPT

## 2022-04-26 PROCEDURE — 97110 THERAPEUTIC EXERCISES: CPT

## 2022-04-26 NOTE — PROGRESS NOTES
PT DAILY TREATMENT NOTE     Patient Name: Genie Titus  Date:2022  : 1945  [x]  Patient  Verified  Payor: Marco Talbert / Plan: [place] MED[place] PART A & B / Product Type: Medicare /    In time: 226 pm  Out time: 317 pm   Total Treatment Time (min): 51  Visit #: 7 of 8    Medicare/BCBS Only   Total Timed Codes (min): 41  1:1 Treatment Time:  41       Treatment Area: Other low back pain [M54.59]    SUBJECTIVE  Pain Level (0-10 scale): 3-4/10   Any medication changes, allergies to medications, adverse drug reactions, diagnosis change, or new procedure performed?: [x] No    [] Yes (see summary sheet for update)  Subjective functional status/changes:   [] No changes reported  Patient reports that her back is doing okay today. Patient states that she does not have the pain going into her thigh like she does sometimes. OBJECTIVE    Modality rationale: decrease pain and increase tissue extensibility to improve the patients ability to assist with performing ADL's and functional tasks without limitations.    Min Type Additional Details    [] Estim:  []Unatt       []IFC  []Premod                        []Other:  []w/ice   []w/heat  Position:  Location:    [] Estim: []Att    []TENS instruct  []NMES                    []Other:  []w/US   []w/ice   []w/heat  Position:  Location:    []  Traction: [] Cervical       []Lumbar                       [] Prone          []Supine                       []Intermittent   []Continuous Lbs:  [] before manual  [] after manual    []  Ultrasound: []Continuous   [] Pulsed                           []1MHz   []3MHz W/cm2:  Location:    []  Iontophoresis with dexamethasone         Location: [] Take home patch   [] In clinic   10 []  Ice     [x]  heat  []  Ice massage  []  Laser   []  Anodyne Position:reclined   Location:L/S    []  Laser with stim  []  Other:  Position:  Location:    []  Vasopneumatic Device    []  Right     []  Left  Pre-treatment girth:  Post-treatment girth: Measured at (location):  Pressure:       [] lo [] med [] hi   Temperature: [] lo [] med [] hi   [] Skin assessment post-treatment:  []intact []redness- no adverse reaction  []redness  adverse reaction:     26 min Therapeutic Exercise:  [x] See flow sheet :   Rationale: increase ROM and increase strength to improve the patients overall muscle endurance and activity tolerance for ADL's and functional tasks. min Therapeutic Activity:  [x]  See flow sheet :   Rationale: safely perform dynamic activities and to improve functional performance of  to improve the patients ability to safely perform dynamic activities and to improve functional performance of ADL's. 15 min Neuromuscular Re-education:  []  See flow sheet :   Rationale: increase strength, improve coordination, improve balance and increase proprioception  to improve the patients ability to perform activities with good form, stability and proprioception.        min Manual Therapy: Shot gun mobs/MET   The manual therapy interventions were performed at a separate and distinct time from the therapeutic activities interventions. Rationale: decrease pain, increase ROM and increase postural awareness to assist with performing ADL's with ease. With   [] TE   [] TA   [] neuro   [] other: Patient Education: [x] Review HEP    [] Progressed/Changed HEP based on:   [] positioning   [] body mechanics   [] transfers   [] heat/ice application    [] other:      Other Objective/Functional Measures:   Right posteriorly rotated SI joint    Pain Level (0-10 scale) post treatment: 0/10    ASSESSMENT/Changes in Function:   Patient is progressing well towards goals. Patient performed exercises, as per flow sheet, to assist with increasing low back flexibility. SI joint dysfunction improved with manual therapy.  Patient responded well to today's session, as evident by, no increase in low back pain and improved SI joint. Patient will continue to benefit from skilled PT services to modify and progress therapeutic interventions, address functional mobility deficits, address ROM deficits, address strength deficits, analyze and address soft tissue restrictions, analyze and cue movement patterns, analyze and modify body mechanics/ergonomics, assess and modify postural abnormalities and instruct in home and community integration to attain remaining goals. []  See Plan of Care  []  See progress note/recertification  []  See Discharge Summary         Progress towards goals / Updated goals:  1 patient will have established and be I with HEP to aid with I and self management at discharge              EVAL issued HEP     Current:  1xday  4/19/22              2 patient will have pain 5/10 to aid with increase tolerance to light activities at home              EVAL 7   Current: Patient reports 3-4/10 pain today. (4/26/2022)   Long Term Goals: To be accomplished in 8 treatments:              1  patient will have pain 3/10 to aid with increase tolerance to moderate activities at home              EVAL 7   Current: Patient reports 3-4/10 pain today. (4/26/2022)               2 patient will have FOTO improved to projected gains to aid with increase tolerance to ADLS and activities at home and in the community              EVAL TBA   Current: Progressing, will assess at next progress note. (4/26/2022)               3 patient will have tolerance to walking 15 -20 minutes to aid with increase tolerance to ADL's and activities at home              EVAL increase pain and LE S/S with walking   Current: Progressing (4/26/2022)     PLAN  [x]  Upgrade activities as tolerated     [x]  Continue plan of care  []  Update interventions per flow sheet       []  Discharge due to:_  [x]  Other: Re-cert, next visit.       Lizbeth Katz PTA 4/26/2022  4:58 PM    Future Appointments   Date Time Provider Jose Morales   4/28/2022  [facility]   5/18/2022  2:45 PM Kathy Olson MD [facility]

## 2022-04-28 ENCOUNTER — HOSPITAL ENCOUNTER (OUTPATIENT)
Dept: PHYSICAL THERAPY | Age: 77
Discharge: HOME | End: 2022-04-28
Attending: PHYSICAL MEDICINE & REHABILITATION
Payer: MEDICARE

## 2022-04-28 PROCEDURE — 97110 THERAPEUTIC EXERCISES: CPT

## 2022-04-28 PROCEDURE — 97530 THERAPEUTIC ACTIVITIES: CPT

## 2022-04-28 NOTE — PROGRESS NOTES
PT DAILY TREATMENT NOTE     Patient Name: Sang Sahu  Date:2022  : 1945  [x]  Patient  Verified  Payor: Severiano Quinton / Plan: [place] & [place] / Product Type: Medicare /    In time: 306 pm  Out time: 406 pm   Total Treatment Time (min): 60   Visit #: 8 of 8    Medicare/BCBS Only   Total Timed Codes (min): 50   1:1 Treatment Time:  50        Treatment Area: Other low back pain [M54.59]    SUBJECTIVE  Pain Level (0-10 scale): 3-4/10   Any medication changes, allergies to medications, adverse drug reactions, diagnosis change, or new procedure performed?: [x] No    [] Yes (see summary sheet for update)  Subjective functional status/changes:   [] No changes reported  Patient reports that her back is good today. Patient     OBJECTIVE    Modality rationale: decrease pain and increase tissue extensibility to improve the patients ability to assist with performing ADL's and functional tasks without limitations.    Min Type Additional Details    [] Estim:  []Unatt       []IFC  []Premod                        []Other:  []w/ice   []w/heat  Position:  Location:    [] Estim: []Att    []TENS instruct  []NMES                    []Other:  []w/US   []w/ice   []w/heat  Position:  Location:    []  Traction: [] Cervical       []Lumbar                       [] Prone          []Supine                       []Intermittent   []Continuous Lbs:  [] before manual  [] after manual    []  Ultrasound: []Continuous   [] Pulsed                           []1MHz   []3MHz W/cm2:  Location:    []  Iontophoresis with dexamethasone         Location: [] Take home patch   [] In clinic   10 []  Ice     [x]  heat  []  Ice massage  []  Laser   []  Anodyne Position:reclined   Location:L/S    []  Laser with stim  []  Other:  Position:  Location:    []  Vasopneumatic Device    []  Right     []  Left  Pre-treatment girth:  Post-treatment girth:  Measured at (location):  Pressure:       [] lo [] med [] hi   Temperature: [] lo [] med [] hi   [] Skin assessment post-treatment:  []intact []redness- no adverse reaction  []redness  adverse reaction:     20 min Therapeutic Exercise:  [x] See flow sheet :   Rationale: increase ROM and increase strength to improve the patients overall muscle endurance and activity tolerance for ADL's and functional tasks. 30 min Therapeutic Activity:  [x]  See flow sheet :FOTO and goals assessed   Rationale: safely perform dynamic activities and to improve functional performance of  to improve the patients ability to safely perform dynamic activities and to improve functional performance of ADL's.       min Neuromuscular Re-education:  []  See flow sheet :   Rationale: increase strength, improve coordination, improve balance and increase proprioception  to improve the patients ability to perform activities with good form, stability and proprioception. min Manual Therapy: The manual therapy interventions were performed at a separate and distinct time from the therapeutic activities interventions. Rationale: decrease pain, increase ROM and increase postural awareness to assist with performing ADL's with ease. With   [] TE   [] TA   [] neuro   [] other: Patient Education: [x] Review HEP    [] Progressed/Changed HEP based on:   [] positioning   [] body mechanics   [] transfers   [] heat/ice application    [] other:      Other Objective/Functional Measures:   FOTO Assessment score: 44 points    Pain Level (0-10 scale) post treatment: 0/10    ASSESSMENT/Changes in Function:   Patient has attended 8 PT sessions and has progressed well towards goals. Patient's low back pain and FOTO assessment score have improved since last assessed. Patient tolerates PT well but still presents with intermittent buttock pain, increased difficulty with walking for a prolonged period of time and negotiating stairs.  Patient reports improvement with PT and would like to continue PT in order to return to PLOF. Patient will continue to benefit from skilled PT services to modify and progress therapeutic interventions, address functional mobility deficits, address ROM deficits, address strength deficits, analyze and address soft tissue restrictions, analyze and cue movement patterns, analyze and modify body mechanics/ergonomics, assess and modify postural abnormalities and instruct in home and community integration to attain remaining goals. []  See Plan of Care  [x]  See progress note/recertification  []  See Discharge Summary         Progress towards goals / Updated goals:  1 patient will have established and be I with HEP to aid with I and self management at discharge  EVAL issued HEP   Current:Patient reports compliance with HEP once a day. (4/28/2022) MET   2 patient will have pain 5/10 to aid with increase tolerance to light activities at home  EVAL 7  Current: Patient reports 3/10 pain with light activities. (4/28/2022) MET     Long Term Goals: To be accomplished in 8 treatments: 1. Patient will have pain 3/10 to aid with increase tolerance to moderate activities at home  EVAL 7  Current:Patient reports 3/10 pain with light activities. (4/28/2022) MET     2. Patient will have FOTO improved to projected gains to aid with increase tolerance to ADLS and activities at home and in the community   EVAL- 41 points   Current: 44 points (4/28/2022) REMAINS    3. Patient will have tolerance to walking 15 -20 minutes to aid with increase tolerance to ADL's and activities at home  EVAL increase pain and LE S/S with walking  Current: Patient reports walking tolerance as 5-10 minutes. (4/28/2022) REMAINS.      PLAN  [x]  Upgrade activities as tolerated     [x]  Continue plan of care  []  Update interventions per flow sheet       []  Discharge due to:_  []  Other:     Libby Huertas PTA 4/28/2022  4:58 PM    Future Appointments   Date Time Provider Jose Moraels   5/18/2022  2:45 PM Jorge Savage MD [facility]

## 2022-04-29 NOTE — PROGRESS NOTES
[facility] [address], [address] [place] [address], [address]  [phone] fax      Continued Plan of Care/ Re-certification for Physical Therapy Services    Patient name: Renny Escobar Start of Care: 22   Referral source: Jorge Savage MD : 1945   Medical/Treatment Diagnosis: Other low back pain [M54.59]  Payor: [place] MED[place]ARE / Plan: [place] MEDICARE PART A & B / Product Type: Medicare /  Onset Date:a few months     Prior Hospitalization: see medical history Provider#: [id number]   Medications: Verified on Patient Summary List    Comorbidities: DM, HTN, Hearing impaired   Prior Level of Function: :I all areas of ADLs and activities, No AD use, able to tolerate household and community activities               Visits from Start of Care: 8  Missed Visits: 0    The Plan of Care and following information is based on the patient's current status:  1 patient will have established and be I with HEP to aid with I and self management at discharge  EVAL tawnya Garibay reports compliance with HEP once a day. (2022) MET   2 patient will have pain 5/10 to aid with increase tolerance to light activities at home  EVAL 7  Current: Patient reports 3/10 pain with light activities. (2022) MET      Long Term Goals: To be accomplished in 8 treatments: 1. Patient will have pain 3/10 to aid with increase tolerance to moderate activities at home  EVAL 7  Current:Patient reports 3/10 pain with light activities. (2022) MET      2. Patient will have FOTO improved to projected gains to aid with increase tolerance to ADLS and activities at home and in the community   EVAL- 41 points   Current: 44 points (2022) REMAINS     3. Patient will have tolerance to walking 15 -20 minutes to aid with increase tolerance to ADL's and activities at home  EVAL increase pain and LE S/S with walking  Current: Patient reports walking tolerance as 5-10 minutes.  (2022) REMAINS. Key functional changes: increase tolerance to exercise      Problems/ barriers to goal attainment: residual decrease tolerance to prolonged standing, decrease tolerance/difficulty with stair negotiation, intermittent pain- buttock pain. Problem List: pain affecting function, decrease ROM, decrease strength, impaired gait/ balance, decrease ADL/ functional abilitiies, decrease activity tolerance, decrease flexibility/ joint mobility, decrease transfer abilities and other FOTO    Treatment Plan: Therapeutic exercise, Therapeutic activities, Neuromuscular re-education, Physical agent/modality, Gait/balance training, Manual therapy, Patient education, Self Care training, Functional mobility training, Home safety training and Stair training     Patient Goal (s) has been updated and includes: get further pain reduction      Goals for this certification period to be accomplished in 4 weeks: 1. Patient will have pain 1-2/10 to aid with increase tolerance to moderate activities at home  PN Patient reports 3/10 pain with light activities. (4/28/2022) MET      2. Patient will have FOTO improved to projected gains to aid with increase tolerance to ADLS and activities at home and in the community   PN  44 points (4/28/2022) REMAINS     3. Patient will have tolerance to walking 15 -20 minutes to aid with increase tolerance to ADL's and activities at home  PN  Patient reports walking tolerance as 5-10 minutes. (4/28/2022) REMAINS. Frequency / Duration: Patient to be seen 2 times per week for 4 weeks:    Assessment / Recommendations:Patient has attended 8 PT sessions and has progressed well towards goals. Patient's low back pain and FOTO assessment score have improved since last assessed. Patient tolerates PT well but still presents with intermittent buttock pain, increased difficulty with walking for a prolonged period of time and negotiating stairs.  Patient reports improvement with PT and would like to continue PT in order to return to PLOF. Patient will continue to benefit from skilled PT services to modify and progress therapeutic interventions, address functional mobility deficits, address ROM deficits, address strength deficits, analyze and address soft tissue restrictions, analyze and cue movement patterns, analyze and modify body mechanics/ergonomics, assess and modify postural abnormalities and instruct in home and community integration to attain remaining goals. Certification Period: 4/29/22 - 5/28/22    Omari Lyon, PT 4/29/2022 7:47 AM    ________________________________________________________________________  I certify that the above Therapy Services are being furnished while the patient is under my care. I agree with the treatment plan and certify that this therapy is necessary. [] I have read the above and request that my patient continue as recommended.   [] I have read the above report and request that my patient continue therapy with the following changes/special instructions: _____________________________________________  [] I have read the above report and request that my patient be discharged from therapy    Physician's Signature:____________Date:_________TIME:________     Donato Naqvi MD  ** Signature, Date and Time must be completed for valid certification **    Please sign and return to In . Carolina Ksawerego [address] [place] [address], [address] [place] [address], [address] [place] [address],[facility] [address]  [phone] fax

## 2022-05-02 ENCOUNTER — HOSPITAL ENCOUNTER (OUTPATIENT)
Dept: PHYSICAL THERAPY | Age: 77
Discharge: HOME | End: 2022-05-02
Attending: PHYSICAL MEDICINE & REHABILITATION
Payer: MEDICARE

## 2022-05-02 PROCEDURE — 97140 MANUAL THERAPY 1/> REGIONS: CPT

## 2022-05-02 PROCEDURE — 97110 THERAPEUTIC EXERCISES: CPT

## 2022-05-02 PROCEDURE — 97530 THERAPEUTIC ACTIVITIES: CPT

## 2022-05-02 NOTE — PROGRESS NOTES
PT DAILY TREATMENT NOTE     Patient Name: Danielle Calderon  Date:2022  : 1945  [x]  Patient  Verified  Payor: Jose F Barnes / Plan: [place] & B / Product Type: Medicare /    In time: 908 am   Out time: 957 am  Total Treatment Time (min): 49  Visit #: 1 of 8    Medicare/BCBS Only   Total Timed Codes (min): 39  1:1 Treatment Time:  39       Treatment Area: Other low back pain [M54.59]    SUBJECTIVE  Pain Level (0-10 scale): 5/10   Any medication changes, allergies to medications, adverse drug reactions, diagnosis change, or new procedure performed?: [x] No    [] Yes (see summary sheet for update)  Subjective functional status/changes:   [] No changes reported  Patient reports that her back is bothering her some today. Patient states that she has some discomfort in the middle of her back today. OBJECTIVE    Modality rationale: decrease pain and increase tissue extensibility to improve the patients ability to assist with performing ADL's and functional tasks without limitations.    Min Type Additional Details    [] Estim:  []Unatt       []IFC  []Premod                        []Other:  []w/ice   []w/heat  Position:  Location:    [] Estim: []Att    []TENS instruct  []NMES                    []Other:  []w/US   []w/ice   []w/heat  Position:  Location:    []  Traction: [] Cervical       []Lumbar                       [] Prone          []Supine                       []Intermittent   []Continuous Lbs:  [] before manual  [] after manual    []  Ultrasound: []Continuous   [] Pulsed                           []1MHz   []3MHz W/cm2:  Location:    []  Iontophoresis with dexamethasone         Location: [] Take home patch   [] In clinic   10 []  Ice     [x]  heat  []  Ice massage  []  Laser   []  Anodyne Position: prone   Location:L/S    []  Laser with stim  []  Other:  Position:  Location:    []  Vasopneumatic Device    []  Right     []  Left  Pre-treatment girth:  Post-treatment girth:  Measured at (location):  Pressure:       [] lo [] med [] hi   Temperature: [] lo [] med [] hi   [] Skin assessment post-treatment:  []intact []redness- no adverse reaction  []redness  adverse reaction:     19 min Therapeutic Exercise:  [x] See flow sheet :   Rationale: increase ROM and increase strength to improve the patients overall muscle endurance and activity tolerance for ADL's and functional tasks. 10 min Therapeutic Activity:  [x]  See flow sheet :FOTO and goals assessed   Rationale: safely perform dynamic activities and to improve functional performance of  to improve the patients ability to safely perform dynamic activities and to improve functional performance of ADL's.       min Neuromuscular Re-education:  []  See flow sheet :   Rationale: increase strength, improve coordination, improve balance and increase proprioception  to improve the patients ability to perform activities with good form, stability and proprioception. 10 min Manual Therapy: Shot gun mobs/MET; DTM and stretching (B) piriformis and glute med   The manual therapy interventions were performed at a separate and distinct time from the therapeutic activities interventions. Rationale: decrease pain, increase ROM and increase postural awareness to assist with performing ADL's with ease. With   [] TE   [] TA   [] neuro   [] other: Patient Education: [x] Review HEP    [] Progressed/Changed HEP based on:   [] positioning   [] body mechanics   [] transfers   [] heat/ice application    [] other:      Other Objective/Functional Measures: Added 6\" step ups   Right SI joint dysfunction   Pain Level (0-10 scale) post treatment: 1/10    ASSESSMENT/Changes in Function:   Patient is progressing well towards goals. Progressed exercises, as per flow sheet, to assist with increasing functional strength. Patient tolerated today's new exercise well.  Manual therapy improved patient's SI joint dysfunction. Patient responded well to today's overall session, as evident by, a decrease in low back pain and improved SI joint dysfunction. Patient will continue to benefit from skilled PT services to modify and progress therapeutic interventions, address functional mobility deficits, address ROM deficits, address strength deficits, analyze and address soft tissue restrictions, analyze and cue movement patterns, analyze and modify body mechanics/ergonomics, assess and modify postural abnormalities and instruct in home and community integration to attain remaining goals. []  See Plan of Care  []  See progress note/recertification  []  See Discharge Summary         Progress towards goals / Updated goals: 1. Patient will have pain 1-2/10 to aid with increase tolerance to moderate activities at home  PN Patient reports 3/10 pain with light activities.   Current: Progressing (5/2/2022)     2. Patient will have FOTO improved to projected gains to aid with increase tolerance to ADLS and activities at home and in the community   PN  44 points    Current: Progressing, will assess at next progress note (5/2/2022)     3. Patient will have tolerance to walking 15 -20 minutes to aid with increase tolerance to ADL's and activities at home  PN  Patient reports walking tolerance as 5-10 minutes.    Current: Progressing (5/2/2022)      PLAN  [x]  Upgrade activities as tolerated     [x]  Continue plan of care  []  Update interventions per flow sheet       []  Discharge due to:_  []  Other:     Anthony Schmitt PTA 5/2/2022  4:58 PM    Future Appointments   Date Time Provider Jose Morales   5/5/2022  3:45 PM Lenora Nest, PTA [facility]   5/9/2022  3:00 PM Deana Pineda, PT [facility]   5/12/2022  3:45 PM [facility]ora Nest, [facility]   5/17/2022  3:45 PM Deana Pineda, PT [facility]   5/18/2022  2:45 PM Satya Tapia MD [facility]   5/19/2022  3:45 PM Lenora Nest, PTA [facility]   5/24/2022  3:45 PM Jhonatan Martinez PTA [facility]   5/26/2022  3:45 PM Jhonatan Martinez PTA [facility]

## 2022-05-03 ENCOUNTER — APPOINTMENT (OUTPATIENT)
Dept: PHYSICAL THERAPY | Age: 77
End: 2022-05-03
Attending: PHYSICAL MEDICINE & REHABILITATION
Payer: MEDICARE

## 2022-05-05 ENCOUNTER — HOSPITAL ENCOUNTER (OUTPATIENT)
Dept: PHYSICAL THERAPY | Age: 77
Discharge: HOME | End: 2022-05-05
Attending: PHYSICAL MEDICINE & REHABILITATION
Payer: MEDICARE

## 2022-05-05 PROCEDURE — 97110 THERAPEUTIC EXERCISES: CPT

## 2022-05-05 PROCEDURE — 97140 MANUAL THERAPY 1/> REGIONS: CPT

## 2022-05-05 PROCEDURE — 97530 THERAPEUTIC ACTIVITIES: CPT

## 2022-05-09 ENCOUNTER — TELEPHONE (OUTPATIENT)
Dept: PHYSICAL THERAPY | Age: 77
End: 2022-05-09

## 2022-05-09 ENCOUNTER — APPOINTMENT (OUTPATIENT)
Dept: PHYSICAL THERAPY | Age: 77
End: 2022-05-09
Attending: PHYSICAL MEDICINE & REHABILITATION
Payer: MEDICARE

## 2022-05-12 ENCOUNTER — TELEPHONE (OUTPATIENT)
Dept: PHYSICAL THERAPY | Age: 77
End: 2022-05-12

## 2022-05-12 ENCOUNTER — APPOINTMENT (OUTPATIENT)
Dept: PHYSICAL THERAPY | Age: 77
End: 2022-05-12
Attending: PHYSICAL MEDICINE & REHABILITATION
Payer: MEDICARE

## 2022-05-16 ENCOUNTER — TELEPHONE (OUTPATIENT)
Dept: PHYSICAL THERAPY | Age: 77
End: 2022-05-16

## 2022-05-17 ENCOUNTER — APPOINTMENT (OUTPATIENT)
Dept: PHYSICAL THERAPY | Age: 77
End: 2022-05-17
Attending: PHYSICAL MEDICINE & REHABILITATION
Payer: MEDICARE

## 2022-05-19 ENCOUNTER — APPOINTMENT (OUTPATIENT)
Dept: PHYSICAL THERAPY | Age: 77
End: 2022-05-19
Attending: PHYSICAL MEDICINE & REHABILITATION
Payer: MEDICARE

## 2022-05-24 ENCOUNTER — APPOINTMENT (OUTPATIENT)
Dept: PHYSICAL THERAPY | Age: 77
End: 2022-05-24
Attending: PHYSICAL MEDICINE & REHABILITATION
Payer: MEDICARE

## 2022-05-25 NOTE — TELEPHONE ENCOUNTER
Patient sees Dr Carlos Jensen    Patients mother is returning call about active armor cast being in  Spoke to office who advised she can bring him in anytime before 3 day for a fitting      Advised mom, verbalized understanding and stated she will bring him in prior to 3  To reynaldo gamble please

## 2022-05-26 ENCOUNTER — APPOINTMENT (OUTPATIENT)
Dept: PHYSICAL THERAPY | Age: 77
End: 2022-05-26
Attending: PHYSICAL MEDICINE & REHABILITATION
Payer: MEDICARE

## 2022-06-02 ENCOUNTER — HOSPITAL ENCOUNTER (OUTPATIENT)
Dept: PHYSICAL THERAPY | Age: 77
Discharge: HOME | End: 2022-06-02
Attending: PHYSICAL MEDICINE & REHABILITATION
Payer: MEDICARE

## 2022-06-02 PROCEDURE — 97530 THERAPEUTIC ACTIVITIES: CPT

## 2022-06-02 PROCEDURE — 97110 THERAPEUTIC EXERCISES: CPT

## 2022-06-02 NOTE — PROGRESS NOTES
PT DAILY TREATMENT NOTE     Patient Name: Roxane Cooper  Date:2022  : 1945  [x]  Patient  Verified  Payor: [place] MEDICARE / Plan: [place] MEDICARE PART A & B / Product Type: Medicare /    In time:3:05  Out time: 3:50  Total Treatment Time (min): 45  Visit #: 1 of 8    Medicare/BCBS Only   Total Timed Codes (min):  45 1:1 Treatment Time:  45       Treatment Area: Other low back pain [M54.59]    SUBJECTIVE  Pain Level (0-10 scale): 4-5  Any medication changes, allergies to medications, adverse drug reactions, diagnosis change, or new procedure performed?: [x] No    [] Yes (see summary sheet for update)  Subjective functional status/changes:   [] No changes reported  \"im tired today. \"    OBJECTIVE    Modality rationale: decrease pain, increase tissue extensibility and increase muscle contraction/control to improve the patients ability to perform ADL    Min Type Additional Details    [] Estim:  []Unatt       []IFC  []Premod                        []Other:  []w/ice   []w/heat  Position:  Location:    [] Estim: []Att    []TENS instruct  []NMES                    []Other:  []w/US   []w/ice   []w/heat  Position:  Location:    []  Traction: [] Cervical       []Lumbar                       [] Prone          []Supine                       []Intermittent   []Continuous Lbs:  [] before manual  [] after manual    []  Ultrasound: []Continuous   [] Pulsed                           []1MHz   []3MHz W/cm2:  Location:    []  Iontophoresis with dexamethasone         Location: [] Take home patch   [] In clinic   10  Timed with reassess goals []  Ice     [x]  heat  []  Ice massage  []  Laser   []  Anodyne Position:long sit  Location:(B) LSP    []  Laser with stim  []  Other:  Position:  Location:    []  Vasopneumatic Device    []  Right     []  Left  Pre-treatment girth:  Post-treatment girth:  Measured at (location):  Pressure:       [] lo [] med [] hi   Temperature: [] lo [] med [] hi   [x] Skin assessment post-treatment: [x]intact []redness- no adverse reaction    []redness - adverse reaction:      min []Eval                  []Re-Eval       20 min Therapeutic Exercise:  [x] See flow sheet :   Rationale: increase ROM and increase strength to improve the patients ability to perform ADL     25 min Therapeutic Activity:  [x]  See flow sheet :   Rationale: increase ROM, increase strength and improve coordination  to improve the patients ability to perform household chores with no limitations      min Neuromuscular Re-education:  [x]  See flow sheet :   Rationale: increase ROM, increase strength, improve coordination, improve balance and increase proprioception  to improve the patients ability to lift and carry groceries with correct mechanics      min Manual Therapy: The manual therapy interventions were performed at a separate and distinct time from the therapeutic activities interventions. Rationale: decrease pain, increase ROM, increase tissue extensibility, decrease edema , decrease trigger points and increase postural awareness to perform ADL      min Gait Training:  ___ feet with ___ device on level surfaces with ___ level of assist   Rationale: With   [x] TE   [x] TA   [] neuro   [] other: Patient Education: [x] Review HEP    [] Progressed/Changed HEP based on:   [] positioning   [] body mechanics   [] transfers   [] heat/ice application    [x] other: REASSESS GOALS     Other Objective/Functional Measures: FOTO 48    Pain Level (0-10 scale) post treatment: 2    ASSESSMENT/Changes in Function: Mrs. Mary Barbour reports 65% improvement. Pain level on average is 4-6/10. Pt reports pain level varies depending on her functional activities. Pt reports difficulty with  prolonged standing and walking. Pt reports she can stand for ~ 10 minutes and ~ 15 min of walking.  FOTO has improved from 41 to 48 since initial eval.    Patient will continue to benefit from skilled PT services to address functional mobility deficits, address ROM deficits, address strength deficits, analyze and address soft tissue restrictions, analyze and cue movement patterns, analyze and modify body mechanics/ergonomics, assess and modify postural abnormalities and instruct in home and community integration to attain remaining goals. [x]  See Plan of Care  []  See progress note/recertification  []  See Discharge Summary         Progress towards goals / Updated goals: 1. Patient will have pain 1-2/10 to aid with increase tolerance to moderate activities at home  PN Patient reports 3/10 pain with light activities.   Current: 4-6/10 varies (6/2/2022)      2. Patient will have FOTO improved to projected gains to aid with increase tolerance to ADLS and activities at home and in the community   IO  59 points    Current:  48    (6/2/2022)      3.  Patient will have tolerance to walking 15 -20 minutes to aid with increase tolerance to ADL's and activities at home  PN  Patient reports walking tolerance as 5-10 minutes.   Current: ~15 minimal pain (6/2/2022)        PLAN  []  Upgrade activities as tolerated     []  Continue plan of care  []  Update interventions per flow sheet       []  Discharge due to:_  [x]  Other:_  FAX  RECERT [address] Darrel Mak PTA 6/2/2022  3:36 PM    Future Appointments   Date Time Provider Jose Morales   6/7/2022  2:15 PM Monica Cervantes, PT [facility]   6/9/2022  3:00 PM Olivier Muller PTA [facility]   6/14/2022  3:00 PM Jhonatan Martinez PTA [facility]   6/16/2022  3:00 PM Jhonatan Martinez, PTA [facility]   7/13/2022  1:45 PM Donato Naqvi MD [facility]

## 2022-06-04 NOTE — PROGRESS NOTES
[facility] [address], [address] [place] [address], [address]  [phone] fax      Continued Plan of Care/ Re-certification for Physical Therapy Services    Patient name: Estephanie Snyder Start of Care: 22   Referral source: Melissa Cabello MD : 1945   Medical/Treatment Diagnosis: Other low back pain [M54.59]  Payor: Tatiana Sal / Plan: [place] MEDICARE PART A & B / Product Type: Medicare /  Onset Date:a few months     Prior Hospitalization: see medical history Provider#: [id number]   Medications: Verified on Patient Summary List    Comorbidities: DM, HTN, Hearing impaired   Prior Level of Function: :I all areas of ADLs and activities, No AD use, able to tolerate household and community activities             Visits from Start of Care: 11  Missed Visits: 1    The Plan of Care and following information is based on the patient's current status: 1. Patient will have pain 1-2/10 to aid with increase tolerance to moderate activities at home  PN Patient reports 3/10 pain with light activities.   Current: 4-6/10 varies (2022)      2. Patient will have FOTO improved to projected gains to aid with increase tolerance to ADLS and activities at home and in the community   TO  70 points    Current:  48    (2022)      3.  Patient will have tolerance to walking 15 -20 minutes to aid with increase tolerance to ADL's and activities at home  PN  Patient reports walking tolerance as 5-10 minutes.   Current: ~15 minimal pain (2022)       Problem List: pain affecting function, decrease ROM, decrease strength, impaired gait/ balance, decrease ADL/ functional abilitiies, decrease activity tolerance, decrease flexibility/ joint mobility, decrease transfer abilities and other FOTO    Treatment Plan: Therapeutic exercise, Therapeutic activities, Neuromuscular re-education, Physical agent/modality, Gait/balance training, Manual therapy, Patient education, Self Care training, Functional mobility training, Home safety training and Stair training     Goals for this certification period to be accomplished in 4 weeks: 1. Patient will have pain 1-2/10 to aid with increase tolerance to moderate activities at home  Recert: 4-0/57 (6/7/6213)   2. Patient will have FOTO improved to projected gains to aid with increase tolerance to ADLS and activities at home and in the community  Recert: 48 (4/6/4317)   3. Patient will have tolerance to walking >/= 20 minutes to aid with increase tolerance to ADL's and activities at home  Recert: 15 min, minimal pain reported (6/2/2022)     Frequency / Duration: Patient to be seen 2 times per week for 4 weeks:    Assessment / Recommendations:  Mrs. Toña Ann reports 65% improvement. Pain level on average is 4-6/10. Pt reports pain level varies depending on her functional activities. Pt reports difficulty with  prolonged standing and walking. Pt reports she can stand for ~ 10 minutes and ~ 15 min of walking. FOTO has improved from 41 to 48 since initial eval. Patient will continue to benefit from skilled PT services to address functional mobility deficits, address ROM deficits, address strength deficits, analyze and address soft tissue restrictions, analyze and cue movement patterns, analyze and modify body mechanics/ergonomics, assess and modify postural abnormalities and instruct in home and community integration to attain remaining goals. Certification Period: 06/02/22 - 07/01/22    Patrica Saini DPT 6/3/2022 7:47 AM    ________________________________________________________________________  I certify that the above Therapy Services are being furnished while the patient is under my care. I agree with the treatment plan and certify that this therapy is necessary. [] I have read the above and request that my patient continue as recommended.   [] I have read the above report and request that my patient continue therapy with the following changes/special instructions: _____________________________________________  [] I have read the above report and request that my patient be discharged from therapy    Physician's Signature:____________Date:_________TIME:________     Citlalli Perez MD  ** Signature, Date and Time must be completed for valid certification **    Please sign and return to [place] [address] [place] [address] [address]  [address] [place] [address], [address] [place] [address], [id number] [address] [phone] fax

## 2022-06-07 ENCOUNTER — HOSPITAL ENCOUNTER (OUTPATIENT)
Dept: PHYSICAL THERAPY | Age: 77
Discharge: HOME | End: 2022-06-07
Attending: PHYSICAL MEDICINE & REHABILITATION
Payer: MEDICARE

## 2022-06-07 PROCEDURE — 97530 THERAPEUTIC ACTIVITIES: CPT

## 2022-06-07 PROCEDURE — 97110 THERAPEUTIC EXERCISES: CPT

## 2022-06-07 NOTE — PROGRESS NOTES
PT DAILY TREATMENT NOTE     Patient Name: Claudell Carrier  Date:2022  : 1945  [x]  Patient  Verified  Payor: [place] MEDICARE / Plan: [place] MEDICARE PART A & B / Product Type: Medicare /    In time:237  Out time:319  Total Treatment Time (min): 42  Visit #: 2 of 8    Medicare/BCBS Only   Total Timed Codes (min):  32 1:1 Treatment Time:  32       Treatment Area: Other low back pain [M54.59]    SUBJECTIVE  Pain Level (0-10 scale): 6  Any medication changes, allergies to medications, adverse drug reactions, diagnosis change, or new procedure performed?: [x] No    [] Yes (see summary sheet for update)  Subjective functional status/changes:   [] No changes reported  \"Today is not a good day for me. I am dragging. \"     OBJECTIVE    Modality rationale: decrease pain and increase tissue extensibility to improve the patients ability to tolerate ADLS and activities   Min Type Additional Details    [] Estim:  []Unatt       []IFC  []Premod                        []Other:  []w/ice   []w/heat  Position:  Location:    [] Estim: []Att    []TENS instruct  []NMES                    []Other:  []w/US   []w/ice   []w/heat  Position:  Location:    []  Traction: [] Cervical       []Lumbar                       [] Prone          []Supine                       []Intermittent   []Continuous Lbs:  [] before manual  [] after manual    []  Ultrasound: []Continuous   [] Pulsed                           []1MHz   []3MHz W/cm2:  Location:    []  Iontophoresis with dexamethasone         Location: [] Take home patch   [] In clinic   10 []  Ice     [x]  heat  Post   []  Ice massage  []  Laser   []  Anodyne Position: reclined  Location:LS    []  Laser with stim  []  Other:  Position:  Location:    []  Vasopneumatic Device    []  Right     []  Left  Pre-treatment girth:  Post-treatment girth:  Measured at (location):  Pressure:       [] lo [] med [] hi   Temperature: [] lo [] med [] hi   [] Skin assessment post-treatment:  []intact []redness- no adverse reaction    []redness - adverse reaction:          16 min Therapeutic Exercise:  [x] See flow sheet :   Rationale: increase ROM, increase strength and improve coordination to improve the patients ability to tolerate ADLS and activities    8 min Therapeutic Activity:  [x]  See flow sheet :   Rationale: increase ROM, increase strength, improve coordination, improve balance and increase proprioception  to improve the patients ability to tolerate ADLS and activities     8 min Neuromuscular Re-education:  [x]  See flow sheet :   Rationale: increase ROM, increase strength and improve coordination  to improve the patients ability to tolerate ADLs and activities     With   [x] TE   [x] TA   [] neuro   [] other: Patient Education: [x] Review HEP    [x] Progressed/Changed HEP based on:   [] positioning   [] body mechanics   [] transfers   [] heat/ice application    [] other:      Other Objective/Functional Measures: VC exercises and technique  TM 3' at  .2 - .3 mph and distance . 01         Pain Level (0-10 scale) post treatment: 3 \"much better. \"    ASSESSMENT/Changes in Function: tolerated well. Residual moderate pain at arrival. Increase tolerance to exercises as seen by ability to tolerate TM 3 minutes with SBA and VC /MC for posture and safety all for carryover to functional mobility at home and in the community. Patient will continue to benefit from skilled PT services to modify and progress therapeutic interventions, address functional mobility deficits, address ROM deficits, address strength deficits, analyze and address soft tissue restrictions, analyze and cue movement patterns, analyze and modify body mechanics/ergonomics, assess and modify postural abnormalities, address imbalance/dizziness and instruct in home and community integration to attain remaining goals.      [x]  See Plan of Care  [x]  See progress note/recertification  []  See Discharge Summary         Progress towards goals / Updated goals: 1. Patient will have pain 1-2/10 to aid with increase tolerance to moderate activities at home  Recert: 4-6/10 (6/2/2022)   CURRENT 6 6/7/22  2. Patient will have FOTO improved to projected gains to aid with increase tolerance to ADLS and activities at home and in the community  Recert: 48 (0/8/1341)   CURRENT ongoing NA today 6/7/22  3. Patient will have tolerance to walking >/= 20 minutes to aid with increase tolerance to ADL's and activities at home  Recert: 15 min, minimal pain reported (6/2/2022)   CURRENT TM 3 minutes speed . 2 - .3 mph 6/7/22     PLAN  [x]  Upgrade activities as tolerated     [x]  Continue plan of care  []  Update interventions per flow sheet       []  Discharge due to:_  []  Other:_      Dilip Payan, PT 6/7/2022  2:39 PM    Future Appointments   Date Time Provider Jose Pollocki   6/9/2022  3:00 PM Martin Jensen PTA [facility]   6/14/2022  3:00 PM Carolyn Espinoza PTA [facility]   6/16/2022  3:00 PM Carolyn Espinoza PTA [facility]   7/13/2022  1:45 PM Nidhi Angelo MD [facility]

## 2022-06-09 ENCOUNTER — HOSPITAL ENCOUNTER (OUTPATIENT)
Dept: PHYSICAL THERAPY | Age: 77
Discharge: HOME | End: 2022-06-09
Attending: PHYSICAL MEDICINE & REHABILITATION
Payer: MEDICARE

## 2022-06-09 PROCEDURE — 97112 NEUROMUSCULAR REEDUCATION: CPT

## 2022-06-09 PROCEDURE — 97110 THERAPEUTIC EXERCISES: CPT

## 2022-06-09 PROCEDURE — 97530 THERAPEUTIC ACTIVITIES: CPT
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
## 2022-06-14 ENCOUNTER — HOSPITAL ENCOUNTER (OUTPATIENT)
Dept: PHYSICAL THERAPY | Age: 77
Discharge: HOME | End: 2022-06-14
Attending: PHYSICAL MEDICINE & REHABILITATION
Payer: MEDICARE

## 2022-06-14 PROCEDURE — 97112 NEUROMUSCULAR REEDUCATION: CPT

## 2022-06-14 PROCEDURE — 97530 THERAPEUTIC ACTIVITIES: CPT

## 2022-06-14 PROCEDURE — 97110 THERAPEUTIC EXERCISES: CPT

## 2022-06-14 NOTE — PROGRESS NOTES
PT DAILY TREATMENT NOTE     Patient Name: Yoni Plata  DAUS:  : 1945  [x]  Patient  Verified  Payor: Pily Case / Plan: [place] MEDICARE PART A & B / Product Type: Medicare /    In time:301 pm Out time: 400 pm   Total Treatment Time (min): 59  Visit #: 4 of 8    Medicare/BCBS Only   Total Timed Codes (min): 49 1:1 Treatment Time:  49       Treatment Area: Other low back pain [M54.59]    SUBJECTIVE  Pain Level (0-10 scale): 2/10   Any medication changes, allergies to medications, adverse drug reactions, diagnosis change, or new procedure performed?: [x] No    [] Yes (see summary sheet for update)  Subjective functional status/changes:   [] No changes reported  Patient reports that her back is feeling better. Patient explains that she gets most of her pain in her butt and down the back of her thighs. OBJECTIVE    Modality rationale: decrease pain and increase tissue extensibility to improve the patients ability to assist with performing ADL's and functional tasks without limitations.    Min Type Additional Details    [] Estim:  []Unatt       []IFC  []Premod                        []Other:  []w/ice   []w/heat  Position:  Location:    [] Estim: []Att    []TENS instruct  []NMES                    []Other:  []w/US   []w/ice   []w/heat  Position:  Location:    []  Traction: [] Cervical       []Lumbar                       [] Prone          []Supine                       []Intermittent   []Continuous Lbs:  [] before manual  [] after manual    []  Ultrasound: []Continuous   [] Pulsed                           []1MHz   []3MHz W/cm2:  Location:    []  Iontophoresis with dexamethasone         Location: [] Take home patch   [] In clinic   10 []  Ice     [x]  heat  []  Ice massage  []  Laser   []  Anodyne Position: prone   Location:L/S    []  Laser with stim  []  Other:  Position:  Location:    []  Vasopneumatic Device    []  Right     []  Left  Pre-treatment girth:  Post-treatment girth:  Measured at (location):  Pressure:       [] lo [] med [] hi   Temperature: [] lo [] med [] hi   [] Skin assessment post-treatment:  []intact []redness- no adverse reaction  []redness - adverse reaction:     22 min Therapeutic Exercise:  [x] See flow sheet :   Rationale: increase ROM and increase strength to improve the patients overall muscle endurance and activity tolerance for ADL's and functional tasks. 10 min Therapeutic Activity:  [x]  See flow sheet :FOTO and goals assessed   Rationale: safely perform dynamic activities and to improve functional performance of  to improve the patients ability to safely perform dynamic activities and to improve functional performance of ADL's. 17 min Neuromuscular Re-education:  []  See flow sheet :   Rationale: increase strength, improve coordination, improve balance and increase proprioception  to improve the patients ability to perform activities with good form, stability and proprioception. min Manual Therapy: The manual therapy interventions were performed at a separate and distinct time from the therapeutic activities interventions. Rationale: decrease pain, increase ROM and increase postural awareness to assist with performing ADL's with ease. With   [] TE   [] TA   [] neuro   [] other: Patient Education: [x] Review HEP    [] Progressed/Changed HEP based on:   [] positioning   [] body mechanics   [] transfers   [] heat/ice application    [] other:      Other Objective/Functional Measures:     Pain Level (0-10 scale) post treatment: 0/10    ASSESSMENT/Changes in Function:   Patient is progressing well towards goals. Patient performed exercises, as per flow sheet, to assist with increasing core strength and improving low back mobility. Patient responded well to today's overall session, as evident by, a decrease in low back pain.  Patient will continue to benefit from skilled PT services to modify and progress therapeutic interventions, address functional mobility deficits, address ROM deficits, address strength deficits, analyze and address soft tissue restrictions, analyze and cue movement patterns, analyze and modify body mechanics/ergonomics, assess and modify postural abnormalities and instruct in home and community integration to attain remaining goals. []  See Plan of Care  []  See progress note/recertification  []  See Discharge Summary         Progress towards goals / Updated goals: 1. Patient will have pain 1-2/10 to aid with increase tolerance to moderate activities at home  Recert: 4-6/10 (6/2/2022)   CURRENT- Pt reports 2/10 pain today. 6/14/22  2. Patient will have FOTO improved to projected gains to aid with increase tolerance to ADLS and activities at home and in the community  Recert: 48 (9/1/8014)   CURRENT ongoing NA today 6/14/22  3. Patient will have tolerance to walking >/= 20 minutes to aid with increase tolerance to ADL's and activities at home  Recert: 15 min, minimal pain reported (6/2/2022)   CURRENT TM 4 minutes speed . 6 mph 6/14/22     PLAN  [x]  Upgrade activities as tolerated     [x]  Continue plan of care  []  Update interventions per flow sheet       []  Discharge due to:_  []  Other:     Eula Briseno PTA 6/14/2022  4:58 PM    Future Appointments   Date Time Provider Jose Morales   6/14/2022  3:00 PM Justo River PTA [facility]   6/16/2022  3:00 PM Justo River PTA [facility]   7/13/2022  1:45 PM Melissa Cabello MD [facility]

## 2022-06-16 ENCOUNTER — APPOINTMENT (OUTPATIENT)
Dept: PHYSICAL THERAPY | Age: 77
End: 2022-06-16
Attending: PHYSICAL MEDICINE & REHABILITATION
Payer: MEDICARE

## 2022-06-24 ENCOUNTER — APPOINTMENT (OUTPATIENT)
Dept: GENERAL RADIOLOGY | Age: 77
End: 2022-06-24
Attending: EMERGENCY MEDICINE
Payer: MEDICARE

## 2022-06-24 ENCOUNTER — HOSPITAL ENCOUNTER (EMERGENCY)
Age: 77
Discharge: HOME | End: 2022-06-24
Attending: EMERGENCY MEDICINE
Payer: MEDICARE

## 2022-06-24 VITALS
HEART RATE: 98 BPM | SYSTOLIC BLOOD PRESSURE: 118 MMHG | TEMPERATURE: 98.9 F | DIASTOLIC BLOOD PRESSURE: 51 MMHG | OXYGEN SATURATION: 97 % | RESPIRATION RATE: 20 BRPM
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
## 2022-06-24 DIAGNOSIS — N64.4 BREAST PAIN, RIGHT: Primary | ICD-10-CM

## 2022-06-24 DIAGNOSIS — S16.1XXA STRAIN OF NECK MUSCLE, INITIAL ENCOUNTER: ICD-10-CM

## 2022-06-24 PROCEDURE — 71046 X-RAY EXAM CHEST 2 VIEWS: CPT

## 2022-06-24 PROCEDURE — 99283 EMERGENCY DEPT VISIT LOW MDM: CPT

## 2022-06-24 RX ORDER — ACETAMINOPHEN 325 MG/1
1000 TABLET ORAL
Qty: 20 TABLET | Refills: 0 | Status: SHIPPED | OUTPATIENT
Start: 2022-06-24
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
## 2022-06-24 NOTE — DISCHARGE INSTRUCTIONS
Is contact your primary care doctor soon as possible for follow-up on x-ray to 7 days. Fauzia sudden change or symptoms including sudden/severe pain, repeated injury, or any other sudden/severe change in your condition please return immediately emergency department further evaluation and treatment.

## 2022-06-29 ENCOUNTER — HOSPITAL ENCOUNTER (OUTPATIENT)
Dept: PHYSICAL THERAPY | Age: 77
Discharge: HOME | End: 2022-06-29
Attending: PHYSICAL MEDICINE & REHABILITATION
Payer: MEDICARE

## 2022-06-29 PROCEDURE — 97110 THERAPEUTIC EXERCISES: CPT

## 2022-06-29 PROCEDURE — 97530 THERAPEUTIC ACTIVITIES: CPT

## 2022-06-29 NOTE — PROGRESS NOTES
PT DAILY TREATMENT NOTE     Patient Name: Afia Miller  Date:2022  : 1945  [x]  Patient  Verified  Payor: Timothy Khan / Plan: [place] MEDICARE PART A & B / Product Type: Medicare /    In time:4:10  Out time:4:57  Total Treatment Time (min): 47  Visit #: 5 of 8    Medicare/BCBS Only   Total Timed Codes (min):  37 1:1 Treatment Time:  37       Treatment Area: Other low back pain [M54.59]    SUBJECTIVE  Pain Level (0-10 scale): 5  Any medication changes, allergies to medications, adverse drug reactions, diagnosis change, or new procedure performed?: [x] No    [] Yes (see summary sheet for update)  Subjective functional status/changes:   [] No changes reported  \"I know I won't be able  To get the full treatment. \"    OBJECTIVE    Modality rationale: decrease pain, increase tissue extensibility and increase muscle contraction/control to improve the patients ability to perform household chores   Min Type Additional Details    [] Estim:  []Unatt       []IFC  []Premod                        []Other:  []w/ice   []w/heat  Position:  Location:    [] Estim: []Att    []TENS instruct  []NMES                    []Other:  []w/US   []w/ice   []w/heat  Position:  Location:    []  Traction: [] Cervical       []Lumbar                       [] Prone          []Supine                       []Intermittent   []Continuous Lbs:  [] before manual  [] after manual    []  Ultrasound: []Continuous   [] Pulsed                           []1MHz   []3MHz W/cm2:  Location:    []  Iontophoresis with dexamethasone         Location: [] Take home patch   [] In clinic   10 []  Ice     [x]  heat  []  Ice massage  []  Laser   []  Anodyne Position:long sit  Location:(B) LSP    []  Laser with stim  []  Other:  Position:  Location:    []  Vasopneumatic Device    []  Right     []  Left  Pre-treatment girth:  Post-treatment girth:  Measured at (location):  Pressure:       [] lo [] med [] hi   Temperature: [] lo [] med [] hi   [x] Skin assessment post-treatment:  [x]intact []redness- no adverse reaction    []redness - adverse reaction:      min []Eval                  []Re-Eval       17 min Therapeutic Exercise:  [x] See flow sheet :   Rationale: increase ROM and increase strength to improve the patients ability to household chores with no limitations    20 min Therapeutic Activity:  [x]  See flow sheet :   Rationale: increase ROM, increase strength and improve coordination  to improve the patients ability to take care of  with no difficulty      min Neuromuscular Re-education:  []  See flow sheet :   Rationale: increase ROM, increase strength, improve coordination, improve balance and increase proprioception  to improve the patients ability to ascend/descend stairs with no difficulty     min Manual Therapy: The manual therapy interventions were performed at a separate and distinct time from the therapeutic activities interventions. Rationale: decrease pain, increase ROM, increase tissue extensibility, decrease edema , decrease trigger points and increase postural awareness to perform daily chores     min Gait Training:  ___ feet with ___ device on level surfaces with ___ level of assist   Rationale:           With   [x] TE   [x] TA   [] neuro   [] other: Patient Education: [x] Review HEP    [] Progressed/Changed HEP based on:   [] positioning   [] body mechanics   [] transfers   [] heat/ice application    [x] other: REASSESS GOALS     Other Objective/Functional Measures: FOTO 40  Functional Gains: Pt reports she can stand longer with support and can bend forward to  items with no difficulty   Functional Deficits: Pt unable to walk and stand for long periods  % improvement: 65% improvment  Pain   Average: 5-6/10       Best: 3/10     Worst:7-8 /10  Pain Level (0-10 scale) post treatment: 2.5-3    ASSESSMENT/Changes in Function: cont PT to decrease pain     Patient will continue to benefit from skilled PT services to address functional mobility deficits, address ROM deficits, address strength deficits, analyze and address soft tissue restrictions, analyze and cue movement patterns, analyze and modify body mechanics/ergonomics, assess and modify postural abnormalities and instruct in home and community integration to attain remaining goals. [x]  See Plan of Care  []  See progress note/recertification  []  See Discharge Summary         Progress towards goals / Updated goals: 1. Patient will have pain 1-2/10 to aid with increase tolerance to moderate activities at home  Recert: 4-6/10 (6/2/2022)   CURRENT- 8-9.  6/29/22 not met  2. Patient will have FOTO improved to projected gains to aid with increase tolerance to ADLS and activities at home and in the community  Recert: 48 (4/4/3809)   CURRENT  40   6/29/22 not met  3. Patient will have tolerance to walking >/= 20 minutes to aid with increase tolerance to ADL's and activities at home  Recert: 15 min, minimal pain reported (6/2/2022)   CURRENT TM 4 minutes speed . 6 mph 6/29/22  Progressing towards    PLAN  []  Upgrade activities as tolerated     [x]  Continue plan of care  []  Update interventions per flow sheet       []  Discharge due to:_  []  Other:_  FAX Recert SAMMIE Wallace PTA 6/29/2022  4:13 PM    Future Appointments   Date Time Provider Jose Morales   7/13/2022  1:45 PM Adina Fan MD [facility]

## 2022-06-30 NOTE — PROGRESS NOTES
[facility] [address], [address] [place] [address], [address]  [phone] fax      Continued Plan of Care/ Re-certification for Physical Therapy Services    Patient name: Rosalinda Trevino Start of Care: 22   Referral source: Daniella Esteves MD : 1945   Medical/Treatment Diagnosis: Other low back pain [M54.59]  Payor: Shania Hazard / Plan: [place] MEDICARE PART A & B / Product Type: Medicare /  Onset Date:a few months     Prior Hospitalization: see medical history Provider#: [id number]   Medications: Verified on Patient Summary List    Comorbidities: DM, HTN, Hearing impaired   Prior Level of Function: :I all areas of ADLs and activities, No AD use, able to tolerate household and community activities             Visits from Start of Care: 15    Missed Visits: 2    The Plan of Care and following information is based on the patient's current status: 1. Patient will have pain 1-2/10 to aid with increase tolerance to moderate activities at home  Recert: 4-10 (2022)   CURRENT- 8-9.  22 not met  2. Patient will have FOTO improved to projected gains to aid with increase tolerance to ADLS and activities at home and in the community  Recert: 48 (9/3/2000)   CURRENT  40   22 not met  3. Patient will have tolerance to walking >/= 20 minutes to aid with increase tolerance to ADL's and activities at home  Recert: 15 min, minimal pain reported (2022)   CURRENT TM 4 minutes speed . 6 mph 22  Progressing towards  Key functional changes: increase tolerance to exercises,   Increase tolerance to standing and ability to bend forward to  items with no difficulty    Problems/ barriers to goal attainment: residual pain and decrease tolerance to standing and walking for prolonged periods    Problem List: pain affecting function, decrease ROM, decrease strength, decrease ADL/ functional abilitiies, decrease activity tolerance and decrease flexibility/ joint mobility    Treatment Plan: Therapeutic exercise, Therapeutic activities, Neuromuscular re-education, Physical agent/modality, Gait/balance training, Manual therapy, Patient education, Self Care training, Functional mobility training, Home safety training and Stair training     Patient Goal (s) has been updated and includes: be able to walk and stand for longer periods of time     Goals for this certification period to be accomplished in 4 weeks: 1. Patient will have pain 1-2/10 to aid with increase tolerance to moderate activities at home  PN  8-9.  6/29/22 not met  2. Patient will have FOTO improved to projected gains 60 to aid with increase tolerance to ADLS and activities at home and in the community  PN  40   6/29/22 not met  3. Patient will have tolerance to walking >/= 20 minutes to aid with increase tolerance to ADL's and activities at home  PN  15 min, minimal pain reported (6/2/2022)  and TM 4 minutes speed . 6 mph 6/29/22  Progressing towards       Frequency / Duration: Patient to be seen 2 times per week for 4 weeks:    Assessment / Recommendations:Patient will continue to benefit from skilled PT services to address functional mobility deficits, address ROM deficits, address strength deficits, analyze and address soft tissue restrictions, analyze and cue movement patterns, analyze and modify body mechanics/ergonomics, assess and modify postural abnormalities and instruct in home and community integration to attain remaining goals. Certification Period: 6/30/22 - 7/29/22    Etienne Marcial, PT 6/30/2022 7:21 AM    ________________________________________________________________________  I certify that the above Therapy Services are being furnished while the patient is under my care. I agree with the treatment plan and certify that this therapy is necessary. [] I have read the above and request that my patient continue as recommended.   [] I have read the above report and request that my patient continue therapy with the following changes/special instructions: _____________________________________________  [] I have read the above report and request that my patient be discharged from therapy    Physician's Signature:____________Date:_________TIME:________     Renetta Renteria MD  ** Signature, Date and Time must be completed for valid certification **    Please sign and return to In [address] [place] [address] [address]  [address] [place] [address], [address] [place] [address], [address] [facility] [address]  [phone] fax

## 2022-07-07 ENCOUNTER — HOSPITAL ENCOUNTER (OUTPATIENT)
Dept: PHYSICAL THERAPY | Age: 77
End: 2022-07-07
Attending: PHYSICAL MEDICINE & REHABILITATION
Payer: MEDICARE

## 2022-07-11 ENCOUNTER — HOSPITAL ENCOUNTER (OUTPATIENT)
Dept: PHYSICAL THERAPY | Age: 77
Discharge: HOME | End: 2022-07-11
Attending: PHYSICAL MEDICINE & REHABILITATION
Payer: MEDICARE

## 2022-07-11 PROCEDURE — 97112 NEUROMUSCULAR REEDUCATION: CPT

## 2022-07-11 PROCEDURE — 97530 THERAPEUTIC ACTIVITIES: CPT

## 2022-07-11 PROCEDURE — 97110 THERAPEUTIC EXERCISES: CPT

## 2022-07-11 NOTE — PROGRESS NOTES
PT DAILY TREATMENT NOTE     Patient Name: Yogi Graf  Date:2022  : 1945  [x]  Patient  Verified  Payor: Alison Wolfe / Plan: [place] MEDICARE PART A & B / Product Type: Medicare /    In time:345  Out time:438  Total Treatment Time (min): 53  Visit #: 1 of 8    Medicare/BCBS Only   Total Timed Codes (min):  43 1:1 Treatment Time:  43       Treatment Area: Other low back pain [M54.59]    SUBJECTIVE  Pain Level (0-10 scale): 6  Any medication changes, allergies to medications, adverse drug reactions, diagnosis change, or new procedure performed?: [x] No    [] Yes (see summary sheet for update)  Subjective functional status/changes:   [] No changes reported  \"It is about a 6 today. \"    OBJECTIVE    Modality rationale: decrease pain and increase tissue extensibility to improve the patients ability to tolerate ADLS and activities   Min Type Additional Details    [] Estim:  []Unatt       []IFC  []Premod                        []Other:  []w/ice   []w/heat  Position:  Location:    [] Estim: []Att    []TENS instruct  []NMES                    []Other:  []w/US   []w/ice   []w/heat  Position:  Location:    []  Traction: [] Cervical       []Lumbar                       [] Prone          []Supine                       []Intermittent   []Continuous Lbs:  [] before manual  [] after manual    []  Ultrasound: []Continuous   [] Pulsed                           []1MHz   []3MHz W/cm2:  Location:    []  Iontophoresis with dexamethasone         Location: [] Take home patch   [] In clinic   10 []  Ice     [x]  heat  Post   []  Ice massage  []  Laser   []  Anodyne Position: reclined  Location:LS     []  Laser with stim  []  Other:  Position:  Location:    []  Vasopneumatic Device    []  Right     []  Left  Pre-treatment girth:  Post-treatment girth:  Measured at (location):  Pressure:       [] lo [] med [] hi   Temperature: [] lo [] med [] hi   [] Skin assessment post-treatment:  []intact []redness- no adverse reaction []redness - adverse reaction:        25 min Therapeutic Exercise:  [x] See flow sheet :   Rationale: increase ROM, increase strength and improve coordination to improve the patients ability to tolerate ADLs and activities    10 min Therapeutic Activity:  [x]  See flow sheet :   Rationale: increase ROM, increase strength and improve coordination  to improve the patients ability to tolerate ADLs and activities     8 min Neuromuscular Re-education:  [x]  See flow sheet :   Rationale: increase ROM, increase strength and improve coordination  to improve the patients ability to tolerate ADLs and activities       With   [x] TE   [x] TA   [x] neuro   [] other: Patient Education: [x] Review HEP    [x] Progressed/Changed HEP based on:   [] positioning   [] body mechanics   [] transfers   [] heat/ice application    [] other:      Other Objective/Functional Measures: VC exercises and technique     Pain Level (0-10 scale) post treatment: <6    ASSESSMENT/Changes in Function: tolerated well. Patient will continue to benefit from skilled PT services to modify and progress therapeutic interventions, address ROM deficits, address strength deficits, analyze and address soft tissue restrictions, analyze and cue movement patterns, analyze and modify body mechanics/ergonomics, assess and modify postural abnormalities and instruct in home and community integration to attain remaining goals. [x]  See Plan of Care  [x]  See progress note/recertification  []  See Discharge Summary         Goals for this certification period to be accomplished in 4 weeks: 1. Patient will have pain 1-2/10 to aid with increase tolerance to moderate activities at home  PN  8-9.  6/29/22 not met  CURRENT  6 7/11/22  2. Patient will have FOTO improved to projected gains 60 to aid with increase tolerance to ADLS and activities at home and in the community  PN  40   6/29/22 not met  CURRENT ongoing NA 7/11/22  3.  Patient will have tolerance to walking >/= 20 minutes to aid with increase tolerance to ADL's and activities at home  PN  15 min, minimal pain reported (6/2/2022)  and TM 4 minutes speed . 6 mph 6/29/22  Progressing towards  CURRENT ongoing NA 7/11/22     PLAN  [x]  Upgrade activities as tolerated     [x]  Continue plan of care  []  Update interventions per flow sheet       []  Discharge due to:_  []  Other:_      Leah Ortiz, PT 7/11/2022  4:11 PM    Future Appointments   Date Time Provider Jose Morales   7/13/2022  1:45 PM Jerri Pena MD [facility]   7/14/2022  3:45 PM Chula Paredes, PT [facility]   7/19/2022  3:45 PM Eladia Martinez, PTA [facility]   7/21/2022  3:45 PM Chula Paredes, PT [facility]   7/26/2022  3:45 PM Eladia Martinez, PTA [facility]   7/28/2022  3:45 PM Margie Stratton, PTA [facility]   8/2/2022  3:45 PM Chula Paredes, PT [facility]   8/4/2022  3:45 PM Chula Paredes, PT [facility]

## 2022-07-13 ENCOUNTER — OFFICE VISIT (OUTPATIENT)
Dept: ORTHOPEDIC SURGERY | Age: 77
End: 2022-07-13
Payer: MEDICARE

## 2022-07-13 VITALS
SYSTOLIC BLOOD PRESSURE: 105 MMHG | OXYGEN SATURATION: 98 % | TEMPERATURE: 98 F | RESPIRATION RATE: 18 BRPM | WEIGHT: 230 LBS | HEART RATE: 87 BPM | BODY MASS INDEX: 42.33 KG/M2 | DIASTOLIC BLOOD PRESSURE: 60 MMHG | HEIGHT: 62 IN

## 2022-07-13 DIAGNOSIS — M54.16 RIGHT LUMBAR RADICULITIS: Primary | ICD-10-CM

## 2022-07-13 DIAGNOSIS — M48.062 LUMBAR STENOSIS WITH NEUROGENIC CLAUDICATION: ICD-10-CM

## 2022-07-13 PROCEDURE — G8432 DEP SCR NOT DOC, RNG: HCPCS | Performed by: PHYSICAL MEDICINE & REHABILITATION

## 2022-07-13 PROCEDURE — 1090F PRES/ABSN URINE INCON ASSESS: CPT | Performed by: PHYSICAL MEDICINE & REHABILITATION

## 2022-07-13 PROCEDURE — G8754 DIAS BP LESS 90: HCPCS | Performed by: PHYSICAL MEDICINE & REHABILITATION

## 2022-07-13 PROCEDURE — G8427 DOCREV CUR MEDS BY ELIG CLIN: HCPCS | Performed by: PHYSICAL MEDICINE & REHABILITATION

## 2022-07-13 PROCEDURE — G8752 SYS BP LESS 140: HCPCS | Performed by: PHYSICAL MEDICINE & REHABILITATION

## 2022-07-13 PROCEDURE — G8536 NO DOC ELDER MAL SCRN: HCPCS | Performed by: PHYSICAL MEDICINE & REHABILITATION

## 2022-07-13 PROCEDURE — G8417 CALC BMI ABV UP PARAM F/U: HCPCS | Performed by: PHYSICAL MEDICINE & REHABILITATION

## 2022-07-13 PROCEDURE — 1123F ACP DISCUSS/DSCN MKR DOCD: CPT | Performed by: PHYSICAL MEDICINE & REHABILITATION

## 2022-07-13 PROCEDURE — 1101F PT FALLS ASSESS-DOCD LE1/YR: CPT | Performed by: PHYSICAL MEDICINE & REHABILITATION

## 2022-07-13 PROCEDURE — G8399 PT W/DXA RESULTS DOCUMENT: HCPCS | Performed by: PHYSICAL MEDICINE & REHABILITATION

## 2022-07-13 PROCEDURE — 99213 OFFICE O/P EST LOW 20 MIN: CPT | Performed by: PHYSICAL MEDICINE & REHABILITATION

## 2022-07-13 RX ORDER — DICLOFENAC SODIUM 10 MG/G
4 GEL TOPICAL
Qty: 500 G | Refills: 5 | Status: SHIPPED | OUTPATIENT
Start: 2022-07-13
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
## 2022-07-14 ENCOUNTER — HOSPITAL ENCOUNTER (OUTPATIENT)
Dept: PHYSICAL THERAPY | Age: 77
Discharge: HOME | End: 2022-07-14
Attending: PHYSICAL MEDICINE & REHABILITATION
Payer: MEDICARE

## 2022-07-14 PROCEDURE — 97110 THERAPEUTIC EXERCISES: CPT

## 2022-07-14 PROCEDURE — 97112 NEUROMUSCULAR REEDUCATION: CPT

## 2022-07-14 NOTE — PROGRESS NOTES
PT DAILY TREATMENT NOTE     Patient Name: Sallie Holly  Date:2022  : 1945  [x]  Patient  Verified  Payor: Ritika Hull / Plan: [place] / Product Type: Medicare /    In time:352  Out time:440  Total Treatment Time (min): 48  Visit #: 2 of 8    Medicare/BCBS Only   Total Timed Codes (min):  38 1:1 Treatment Time:  38       Treatment Area: Other low back pain [M54.59]    SUBJECTIVE  Pain Level (0-10 scale): 3  Any medication changes, allergies to medications, adverse drug reactions, diagnosis change, or new procedure performed?: [x] No    [] Yes (see summary sheet for update)  Subjective functional status/changes:   [] No changes reported  \"I need to rest a minute my sugar is low.  \"    OBJECTIVE    Modality rationale: decrease pain and post exercise to improve the patients ability to tolerate ADLS and activities   Min Type Additional Details    [] Estim:  []Unatt       []IFC  []Premod                        []Other:  []w/ice   []w/heat  Position:  Location:    [] Estim: []Att    []TENS instruct  []NMES                    []Other:  []w/US   []w/ice   []w/heat  Position:  Location:    []  Traction: [] Cervical       []Lumbar                       [] Prone          []Supine                       []Intermittent   []Continuous Lbs:  [] before manual  [] after manual    []  Ultrasound: []Continuous   [] Pulsed                           []1MHz   []3MHz W/cm2:  Location:    []  Iontophoresis with dexamethasone         Location: [] Take home patch   [] In clinic   10 []  Ice     [x]  Heat    post  []  Ice massage  []  Laser   []  Anodyne Position:reclined  Location:LS    []  Laser with stim  []  Other:  Position:  Location:    []  Vasopneumatic Device    []  Right     []  Left  Pre-treatment girth:  Post-treatment girth:  Measured at (location):  Pressure:       [] lo [] med [] hi   Temperature: [] lo [] med [] hi   [] Skin assessment post-treatment:  []intact []redness- no adverse reaction    []redness - adverse reaction:        30 min Therapeutic Exercise:  [x] See flow sheet :   Rationale: increase ROM, increase strength and improve coordination to improve the patients ability to tolerate ADLs and activities     8 min Neuromuscular Re-education:  [x]  See flow sheet :   Rationale: increase ROM, increase strength and improve coordination  to improve the patients ability to tolerate ADLs and activiites         With   [] TE   [] TA   [] neuro   [] other: Patient Education: [x] Review HEP    [] Progressed/Changed HEP based on:   [] positioning   [] body mechanics   [] transfers   [] heat/ice application    [] other:      Other Objective/Functional Measures: VC exercises and  tech     Pain Level (0-10 scale) post treatment: 4    ASSESSMENT/Changes in Function: held TM due to concerns of feeling hypoglycemic. Tolerated all other exercises well. Patient will continue to benefit from skilled PT services to modify and progress therapeutic interventions, address ROM deficits, address strength deficits, analyze and address soft tissue restrictions, analyze and cue movement patterns, analyze and modify body mechanics/ergonomics, assess and modify postural abnormalities and instruct in home and community integration to attain remaining goals. [x]  See Plan of Care  [x]  See progress note/recertification  []  See Discharge Summary         Goals for this certification period to be accomplished in 4 weeks: 1. Patient will have pain 1-2/10 to aid with increase tolerance to moderate activities at home  PN  8-9.  6/29/22 not met  CURRENT 4 7/14/22  2. Patient will have FOTO improved to projected gains 60 to aid with increase tolerance to ADLS and activities at home and in the community  PN  40   6/29/22 not met  CURRENT ongoing NA 7/14/22  3.  Patient will have tolerance to walking >/= 20 minutes to aid with increase tolerance to ADL's and activities at home  PN  15 min, minimal pain reported (6/2/2022)  and TM 4 minutes speed . 6 mph 6/29/22  Progressing towards  CURRENT ongoing NA 7/14/22  PLAN  [x]  Upgrade activities as tolerated     [x]  Continue plan of care  []  Update interventions per flow sheet       []  Discharge due to:_  []  Other:_      Hollie Gates, PT 7/14/2022  4:36 PM    Future Appointments   Date Time Provider Jose Morales   7/19/2022  3:45 PM Ellen Pizano, PTA [facility]   7/21/2022  3:45 PM Kaia Hirsch, PT [facility]   7/26/2022  3:45 PM Ellen Pizano, PTA [facility]   7/28/2022  3:45 PM Lisa Hart, PTA [facility]   8/2/2022  3:45 PM Kaia Hirsch, PT [facility]   8/4/2022  3:45 PM Kaia Hirsch, PT [facility]   8/24/2022  1:30 PM Luciano Waters MD [facility]

## 2022-07-21 ENCOUNTER — HOSPITAL ENCOUNTER (OUTPATIENT)
Dept: PHYSICAL THERAPY | Age: 77
Discharge: HOME | End: 2022-07-21
Attending: PHYSICAL MEDICINE & REHABILITATION
Payer: MEDICARE

## 2022-07-21 PROCEDURE — 97112 NEUROMUSCULAR REEDUCATION: CPT

## 2022-07-21 PROCEDURE — 97110 THERAPEUTIC EXERCISES: CPT

## 2022-07-21 PROCEDURE — 97530 THERAPEUTIC ACTIVITIES: CPT

## 2022-07-21 NOTE — PROGRESS NOTES
PT DAILY TREATMENT NOTE     Patient Name: Rosalinda Trevino  Date:2022  : 1945  [x]  Patient  Verified  Payor: Shania Hazard / Plan: [place] MEDICARE PART A & B / Product Type: Medicare /    In time:345  Out time:445  Total Treatment Time (min): 60  Visit #: 3 of 8    Medicare/BCBS Only   Total Timed Codes (min):  50 1:1 Treatment Time:  50       Treatment Area: Other low back pain [M54.59]    SUBJECTIVE  Pain Level (0-10 scale): 5-6  Any medication changes, allergies to medications, adverse drug reactions, diagnosis change, or new procedure performed?: [x] No    [] Yes (see summary sheet for update)  Subjective functional status/changes:   [] No changes reported     Reports continued right leg pain and numbness.  The numbness has been there and that's why I went to see her (the doctor) and I had an MRI  It got a little more intense and that's why she recommended PT.   OBJECTIVE    Modality rationale: decrease pain and increase tissue extensibility to improve the patients ability to tolerate ADLS and activities   Min Type Additional Details    [] Estim:  []Unatt       []IFC  []Premod                        []Other:  []w/ice   []w/heat  Position:  Location:    [] Estim: []Att    []TENS instruct  []NMES                    []Other:  []w/US   []w/ice   []w/heat  Position:  Location:    []  Traction: [] Cervical       []Lumbar                       [] Prone          []Supine                       []Intermittent   []Continuous Lbs:  [] before manual  [] after manual    []  Ultrasound: []Continuous   [] Pulsed                           []1MHz   []3MHz W/cm2:  Location:    []  Iontophoresis with dexamethasone         Location: [] Take home patch   [] In clinic   10 []  Ice     [x]  Heat post  []  Ice massage  []  Laser   []  Anodyne Position:reclined   Location:lumbar    []  Laser with stim  []  Other:  Position:  Location:    []  Vasopneumatic Device    []  Right     []  Left  Pre-treatment girth:  Post-treatment girth:  Measured at (location):  Pressure:       [] lo [] med [] hi   Temperature: [] lo [] med [] hi   [] Skin assessment post-treatment:  []intact []redness- no adverse reaction    []redness - adverse reaction:          34 min Therapeutic Exercise:  [x] See flow sheet :   Rationale: increase ROM, increase strength, and improve coordination to improve the patients ability to tolerate ADLs and activities    8 min Therapeutic Activity:  [x]  See flow sheet :   Rationale: increase ROM, increase strength, and improve coordination  to improve the patients ability to tolerate ADLS and activities     8 min Neuromuscular Re-education:  []  See flow sheet :   Rationale: increase ROM, increase strength, and improve coordination  to improve the patients ability to tolerate ADLS and activities             With   [x] TE   [x] TA   [x] neuro   [] other: Patient Education: [x] Review HEP    [x] Progressed/Changed HEP based on:   [] positioning   [] body mechanics   [] transfers   [] heat/ice application    [] other:      Other Objective/Functional Measures: VC exercises and technique    Further education on exercises and technique for nerve glide B LE and B hamstring stretch and piriformis     Pain Level (0-10 scale) post treatment: <8    ASSESSMENT/Changes in Function: tolerated fairly well with residual reports of primarily right LE S/S. Time spent reviewing nerve glides, hamstring stretches and piriformis stretching. Right LE + neural tension.      Patient will continue to benefit from skilled PT services to modify and progress therapeutic interventions, address functional mobility deficits, address ROM deficits, address strength deficits, analyze and address soft tissue restrictions, analyze and cue movement patterns, analyze and modify body mechanics/ergonomics, assess and modify postural abnormalities, address imbalance/dizziness, and instruct in home and community integration to attain remaining goals. [x]  See Plan of Care  [x]  See progress note/recertification  []  See Discharge Summary         Progress towards goals / Updated goals:  Goals for this certification period to be accomplished in 4 weeks: 1. Patient will have pain 1-2/10 to aid with increase tolerance to moderate activities at home  PN  8-9.  6/29/22 not met  CURRENT 5-6 7/21/22  2. Patient will have FOTO improved to projected gains 60 to aid with increase tolerance to ADLS and activities at home and in the community  PN  40   6/29/22 not met  CURRENT ongoing NA 7/21/22  3. Patient will have tolerance to walking >/= 20 minutes to aid with increase tolerance to ADL's and activities at home  PN  15 min, minimal pain reported (6/2/2022)  and TM 4 minutes speed . 6 mph 6/29/22  Progressing towards  CURRENT held TM today due to footwear,   7/21/22  PLAN    PLAN  [x]  Upgrade activities as tolerated     [x]  Continue plan of care  []  Update interventions per flow sheet       []  Discharge due to:_  []  Other:_      Jose F Bazan, PT 7/21/2022  3:54 PM    Future Appointments   Date Time Provider Jose Morales   7/26/2022  3:45 PM Edmund Pagan PTA [facility]   7/28/2022  3:45 PM Nanda Black PTA [facility]   8/2/2022  3:45 PM Bartolo Payton PT [facility]   8/4/2022  3:45 PM Bartolo Payton, PT [facility]   8/24/2022  1:30 PM Zoila Johnson MD [facility]

## 2022-07-26 ENCOUNTER — HOSPITAL ENCOUNTER (OUTPATIENT)
Dept: PHYSICAL THERAPY | Age: 77
Discharge: HOME | End: 2022-07-26
Attending: PHYSICAL MEDICINE & REHABILITATION
Payer: MEDICARE

## 2022-07-26 PROCEDURE — 97112 NEUROMUSCULAR REEDUCATION: CPT

## 2022-07-26 PROCEDURE — 97110 THERAPEUTIC EXERCISES: CPT

## 2022-07-26 PROCEDURE — 97530 THERAPEUTIC ACTIVITIES: CPT

## 2022-07-26 NOTE — PROGRESS NOTES
PT DAILY TREATMENT NOTE     Patient Name: Kelley Ashley  Date:2022  : 1945  [x]  Patient  Verified  Payor: Matilda Marcial / Plan: [place] MEDICARE PART A & B / Product Type: Medicare /    In time:345 pm Out time: 440 pm   Total Treatment Time (min): 55  Visit #: 4 of 8    Medicare/BCBS Only   Total Timed Codes (min): 45 1:1 Treatment Time:  45       Treatment Area: Other low back pain [M54.59]    SUBJECTIVE  Pain Level (0-10 scale): 5/10   Any medication changes, allergies to medications, adverse drug reactions, diagnosis change, or new procedure performed?: [x] No    [] Yes (see summary sheet for update)  Subjective functional status/changes:   [] No changes reported  Patient reports that her back is achy today. OBJECTIVE    Modality rationale: decrease pain and increase tissue extensibility to improve the patients ability to assist with performing ADL's and functional tasks without limitations.    Min Type Additional Details    [] Estim:  []Unatt       []IFC  []Premod                        []Other:  []w/ice   []w/heat  Position:  Location:    [] Estim: []Att    []TENS instruct  []NMES                    []Other:  []w/US   []w/ice   []w/heat  Position:  Location:    []  Traction: [] Cervical       []Lumbar                       [] Prone          []Supine                       []Intermittent   []Continuous Lbs:  [] before manual  [] after manual    []  Ultrasound: []Continuous   [] Pulsed                           []1MHz   []3MHz W/cm2:  Location:    []  Iontophoresis with dexamethasone         Location: [] Take home patch   [] In clinic   10 []  Ice     [x]  heat  []  Ice massage  []  Laser   []  Anodyne Position: prone   Location:L/S    []  Laser with stim  []  Other:  Position:  Location:    []  Vasopneumatic Device    []  Right     []  Left  Pre-treatment girth:  Post-treatment girth:  Measured at (location):  Pressure:       [] lo [] med [] hi   Temperature: [] lo [] med [] hi   [] Skin assessment post-treatment:  []intact []redness- no adverse reaction  []redness - adverse reaction:     20 min Therapeutic Exercise:  [x] See flow sheet :   Rationale: increase ROM and increase strength to improve the patients overall muscle endurance and activity tolerance for ADL's and functional tasks. 10 min Therapeutic Activity:  [x]  See flow sheet :FOTO and goals assessed   Rationale: safely perform dynamic activities and to improve functional performance of  to improve the patients ability to safely perform dynamic activities and to improve functional performance of ADL's. 15 min Neuromuscular Re-education:  []  See flow sheet :   Rationale: increase strength, improve coordination, improve balance and increase proprioception  to improve the patients ability to perform activities with good form, stability and proprioception. min Manual Therapy: The manual therapy interventions were performed at a separate and distinct time from the therapeutic activities interventions. Rationale: decrease pain, increase ROM and increase postural awareness to assist with performing ADL's with ease. With   [] TE   [] TA   [] neuro   [] other: Patient Education: [x] Review HEP    [] Progressed/Changed HEP based on:   [] positioning   [] body mechanics   [] transfers   [] heat/ice application    [] other:      Other Objective/Functional Measures:     Pain Level (0-10 scale) post treatment: 2-3/10    ASSESSMENT/Changes in Function:   Patient is progressing well towards goals. Patient performed exercises, as per flow sheet, to assist with improving low back mobility. Increased muscle fatigue was experience during today's session. Patient tolerated today's session well, as evident by, a decrease in low back pain. Patient will continue to benefit from skilled PT services to modify and progress therapeutic interventions, address functional mobility deficits, address ROM deficits, address strength deficits, analyze and address soft tissue restrictions, analyze and cue movement patterns, analyze and modify body mechanics/ergonomics, assess and modify postural abnormalities and instruct in home and community integration to attain remaining goals. []  See Plan of Care  []  See progress note/recertification  []  See Discharge Summary         Progress towards goals / Updated goals:  Goals for this certification period to be accomplished in 4 weeks: 1. Patient will have pain 1-2/10 to aid with increase tolerance to moderate activities at home  PN  8-9.  6/29/22 not met  CURRENT 5/10 pain reported today. 7/26/22  2. Patient will have FOTO improved to projected gains 60 to aid with increase tolerance to ADLS and activities at home and in the community  PN  40   6/29/22 not met  CURRENT ongoing NA 7/26/22  3. Patient will have tolerance to walking >/= 20 minutes to aid with increase tolerance to ADL's and activities at home  PN  15 min, minimal pain reported (6/2/2022)  and TM 4 minutes speed . 6 mph 6/29/22  Progressing towards  CURRENT held TM today due to footwear,   7/21/22    PLAN  [x]  Upgrade activities as tolerated     [x]  Continue plan of care  []  Update interventions per flow sheet       []  Discharge due to:_  []  Other:     Thai Maldonado PTA 7/26/2022  4:58 PM    Future Appointments   Date Time Provider Jose Morales   7/26/2022  3:45 PM Narciso Jefferson PTA [facility]   7/28/2022  3:45 PM Jemma Giron, PTA [facility]   8/2/2022  3:45 PM Bethany Alcaraz, PT [facility]   8/4/2022  3:45 PM Bethany Alcaraz, PT [facility]   8/24/2022  1:30 PM Elsa Rudolph MD [facility]

## 2022-07-28 ENCOUNTER — HOSPITAL ENCOUNTER (OUTPATIENT)
Dept: PHYSICAL THERAPY | Age: 77
Discharge: HOME | End: 2022-07-28
Attending: PHYSICAL MEDICINE & REHABILITATION
Payer: MEDICARE

## 2022-07-28 PROCEDURE — 97530 THERAPEUTIC ACTIVITIES: CPT

## 2022-07-28 PROCEDURE — 97110 THERAPEUTIC EXERCISES: CPT

## 2022-07-28 NOTE — PROGRESS NOTES
PT DAILY TREATMENT NOTE     Patient Name: Yaneth Vegas  Date:2022  : 1945  [x]  Patient  Verified  Payor: Zander Moreno / Plan: [place] MEDICARE PART A & B / Product Type: Medicare /    In time:3:48  Out time:4:36  Total Treatment Time (min): 48  Visit #: 5 of 8    Medicare/BCBS Only   Total Timed Codes (min): 48 1:1 Treatment Time:  26       Treatment Area: Other low back pain [M54.59]    SUBJECTIVE  Pain Level (0-10 scale): 3  Any medication changes, allergies to medications, adverse drug reactions, diagnosis change, or new procedure performed?: [x] No    [] Yes (see summary sheet for update)  Subjective functional status/changes:   [] No changes reported  \"Im feeling better today. \"    OBJECTIVE    Modality rationale: decrease pain, increase tissue extensibility, and increase muscle contraction/control to improve the patients ability to perform ADL   Min Type Additional Details    [] Estim:  []Unatt       []IFC  []Premod                        []Other:  []w/ice   []w/heat  Position:  Location:    [] Estim: []Att    []TENS instruct  []NMES                    []Other:  []w/US   []w/ice   []w/heat  Position:  Location:    []  Traction: [] Cervical       []Lumbar                       [] Prone          []Supine                       []Intermittent   []Continuous Lbs:  [] before manual  [] after manual    []  Ultrasound: []Continuous   [] Pulsed                           []1MHz   []3MHz W/cm2:  Location:    []  Iontophoresis with dexamethasone         Location: [] Take home patch   [] In clinic    []  Ice     []  heat  []  Ice massage  []  Laser   []  Anodyne Position:  Location:    []  Laser with stim  []  Other:  Position:  Location:    []  Vasopneumatic Device    []  Right     []  Left  Pre-treatment girth:  Post-treatment girth:  Measured at (location):  Pressure:       [] lo [] med [] hi   Temperature: [] lo [] med [] hi   [x] Skin assessment post-treatment:  [x]intact []redness- no adverse reaction    []redness - adverse reaction:      min []Eval                  []Re-Eval       30 min Therapeutic Exercise:  [] See flow sheet :   Rationale: increase ROM and increase strength to improve the patients ability to perform ADL     8 min Therapeutic Activity:  [x]  See flow sheet :   Rationale: increase ROM, increase strength, and improve coordination  to improve the patients ability to perform ADL and household chores     10 min Neuromuscular Re-education:  [x]  See flow sheet :   Rationale: increase ROM, increase strength, improve coordination, improve balance, and increase proprioception  to improve the patients ability to perform ADL      min Manual Therapy: The manual therapy interventions were performed at a separate and distinct time from the therapeutic activities interventions. Rationale: decrease pain, increase ROM, increase tissue extensibility, and decrease trigger points to perform ADL      min Gait Training:  ___ feet with ___ device on level surfaces with ___ level of assist   Rationale: With   [] TE   [] TA   [] neuro   [] other: Patient Education: [x] Review HEP    [] Progressed/Changed HEP based on:   [] positioning   [] body mechanics   [] transfers   [] heat/ice application    [] other:      Other Objective/Functional Measures:  Increased time on bike to 6 min    Pain Level (0-10 scale) post treatment: 0    ASSESSMENT/Changes in Function: Pt reports she is still having numbness at right LE. Pt completed each strengthening and flexibility there ex well with cues for correct form. Pt benefited with treatment due to no pain.  Pt is making some gains with PT since initial eval.  Patient will continue to benefit from skilled PT services to modify and progress therapeutic interventions, address functional mobility deficits, address ROM deficits, address strength deficits, analyze and address soft tissue restrictions, analyze and cue movement patterns, assess and modify postural abnormalities, and instruct in home and community integration to attain remaining goals. [x]  See Plan of Care  []  See progress note/recertification  []  See Discharge Summary         Progress towards goals / Updated goals:  Goals for this certification period to be accomplished in 4 weeks: 1. Patient will have pain 1-2/10 to aid with increase tolerance to moderate activities at home  PN  8-9.  6/29/22 not met  CURRENT 5/10 pain reported today. 7/26/22  2. Patient will have FOTO improved to projected gains 60 to aid with increase tolerance to ADLS and activities at home and in the community  PN  40   6/29/22 not met  CURRENT ongoing NA 7/26/22  3. Patient will have tolerance to walking >/= 20 minutes to aid with increase tolerance to ADL's and activities at home  PN  15 min, minimal pain reported (6/2/2022)  and TM 4 minutes speed . 6 mph 6/29/22  Progressing towards  CURRENT held TM today due to footwear,   7/21/22    PLAN  []  Upgrade activities as tolerated     []  Continue plan of care  []  Update interventions per flow sheet       []  Discharge due to:_  []  Other:_      Evelyn Chan, SHAKIR 7/28/2022  4:17 PM    Future Appointments   Date Time Provider Jose Morales   8/2/2022  3:45 PM Marvin Galloway PT [facility]   8/4/2022  3:45 PM Marvin Galloway PT [facility]   8/24/2022  1:30 PM Renetta Renteria MD [facility]

## 2022-08-02 ENCOUNTER — HOSPITAL ENCOUNTER (OUTPATIENT)
Dept: PHYSICAL THERAPY | Age: 77
Discharge: HOME | End: 2022-08-02
Attending: PHYSICAL MEDICINE & REHABILITATION
Payer: MEDICARE

## 2022-08-02 PROCEDURE — 97530 THERAPEUTIC ACTIVITIES: CPT

## 2022-08-02 PROCEDURE — 97112 NEUROMUSCULAR REEDUCATION: CPT

## 2022-08-02 PROCEDURE — 97110 THERAPEUTIC EXERCISES: CPT

## 2022-08-02 NOTE — PROGRESS NOTES
PT DAILY TREATMENT NOTE     Patient Name: Yaneth Vegas  Date:2022  : 1945  [x]  Patient  Verified  Payor: [place] MEDICARE / Plan: VA MEDICARE PART A & B / Product Type: Medicare /    In time:303 Out time:356  Total Treatment Time (min): 53  Visit #: 1 of 8    Medicare/BCBS Only   Total Timed Codes (min):  53 1:1 Treatment Time:  53       Treatment Area: Other low back pain [M54.59]    SUBJECTIVE  Pain Level (0-10 scale): 4  Any medication changes, allergies to medications, adverse drug reactions, diagnosis change, or new procedure performed?: [x] No    [] Yes (see summary sheet for update)  Subjective functional status/changes:   [] No changes reported  \"I am doing pretty good today. If I keep up with these exercises will the numbness go away? \"    OBJECTIVE    Modality rationale:      Min Type Additional Details    [] Estim:  []Unatt       []IFC  []Premod                        []Other:  []w/ice   []w/heat  Position:  Location:    [] Estim: []Att    []TENS instruct  []NMES                    []Other:  []w/US   []w/ice   []w/heat  Position:  Location:    []  Traction: [] Cervical       []Lumbar                       [] Prone          []Supine                       []Intermittent   []Continuous Lbs:  [] before manual  [] after manual    []  Ultrasound: []Continuous   [] Pulsed                           []1MHz   []3MHz W/cm2:  Location:    []  Iontophoresis with dexamethasone         Location: [] Take home patch   [] In clinic   PD []  Ice     []  heat  []  Ice massage  []  Laser   []  Anodyne Position:  Location:    []  Laser with stim  []  Other:  Position:  Location:    []  Vasopneumatic Device    []  Right     []  Left  Pre-treatment girth:  Post-treatment girth:  Measured at (location):  Pressure:       [] lo [] med [] hi   Temperature: [] lo [] med [] hi   [] Skin assessment post-treatment:  []intact []redness- no adverse reaction    []redness - adverse reaction:          35 min Therapeutic Exercise:  [x] See flow sheet :   Rationale: increase ROM, increase strength, and improve coordination to improve the patients ability to tolerate ADLS and activities    10 min Therapeutic Activity:  []  See flow sheet :   Rationale: increase ROM, increase strength, and improve coordination  to improve the patients ability to tolerate ADLS and activities     8 min Neuromuscular Re-education:  []  See flow sheet :   Rationale: increase ROM, increase strength, and improve coordination  to improve the patients ability to samantha     Rationale: With   [x] TE   [x] TA   [x] neuro   [] other: Patient Education: [x] Review HEP    [] Progressed/Changed HEP based on:   [] positioning   [] body mechanics   [] transfers   [] heat/ice application    [] other:      Other Objective/Functional Measures: VC exercises and technique     Pain Level (0-10 scale) post treatment: 4    ASSESSMENT/Changes in Function: tolerated well wth residual right LE S/S. Good tolerance to exercises today. Patient will continue to benefit from skilled PT services to modify and progress therapeutic interventions, address functional mobility deficits, address ROM deficits, address strength deficits, analyze and address soft tissue restrictions, analyze and cue movement patterns, analyze and modify body mechanics/ergonomics, assess and modify postural abnormalities, address imbalance/dizziness, and instruct in home and community integration to attain remaining goals. [x]  See Plan of Care  [x]  See progress note/recertification  []  See Discharge Summary         Progress towards goals / Updated goals:     Goals for this certification period to be accomplished in 4 weeks: 1. Patient will have pain 1-2/10 to aid with increase tolerance to moderate activities at home  3/10 pain reported today. 7/28/22  2.  Patient will have FOTO improved to projected gains 60 to aid with increase tolerance to ADLS and activities at home and in the community  PN  ongoing NA 7/28/22  3.  Patient will have tolerance to walking >/= 20 minutes to aid with increase tolerance to ADL's and activities at home  PN  held TM today due to footwear,   7/21/22    PLAN  [x]  Upgrade activities as tolerated     [x]  Continue plan of care  []  Update interventions per flow sheet       []  Discharge due to:_  []  Other:_      Etienne Marcial PT 8/2/2022  3:18 PM    Future Appointments   Date Time Provider Jose Morales   8/4/2022  3:45 PM Uzma Capps PT [facility]   8/24/2022  1:30 PM Daniella Esteves MD [facility]

## 2022-08-02 NOTE — PROGRESS NOTES
[facility] [address] [address], [address] [place] [address], [address]  [phone] fax      Continued Plan of Care/ Re-certification for Physical Therapy Services    Patient name: Kalin Rawls Start of Care: 22   Referral source: Jerad Simon MD : 1945   Medical/Treatment Diagnosis: Other low back pain [M54.59]  Payor: Cris Escobedo / Plan: VA MEDICARE PART A & B / Product Type: Medicare /  Onset Date:a few months     Prior Hospitalization: see medical history Provider#: [id number]   Medications: Verified on Patient Summary List    Comorbidities: DM, HTN, Hearing impaired   Prior Level of Function: :I all areas of ADLs and activities, No AD use, able to tolerate household and community activities    Visits from Start of Care: 22    Missed Visits: 4    The Plan of Care and following information is based on the patient's current status:  Goals for this certification period to be accomplished in 4 weeks: 1. Patient will have pain 1-2/10 to aid with increase tolerance to moderate activities at home  PN  8-9.  22 not met  CURRENT 5/10 pain reported today. 22  2. Patient will have FOTO improved to projected gains 60 to aid with increase tolerance to ADLS and activities at home and in the community  PN  40   22 not met  CURRENT ongoing NA 22  3. Patient will have tolerance to walking >/= 20 minutes to aid with increase tolerance to ADL's and activities at home  PN  15 min, minimal pain reported (2022)  and TM 4 minutes speed . 6 mph 22  Progressing towards  CURRENT held TM today due to footwear,   22     Key functional changes: decrease pain and increase tolerance to exercise      Problems/ barriers to goal attainment: residual pain     Problem List: pain affecting function, decrease ROM, decrease strength, impaired gait/ balance, decrease ADL/ functional abilitiies, decrease activity tolerance, decrease flexibility/ joint mobility, and decrease transfer abilities    Treatment Plan: Therapeutic exercise, Therapeutic activities, Neuromuscular re-education, Physical agent/modality, Gait/balance training, Manual therapy, Patient education, Self Care training, Functional mobility training, Home safety training, and Stair training     Patient Goal (s) has been updated and includes: decrease pain     Goals for this certification period to be accomplished in 4 weeks: 1. Patient will have pain 1-2/10 to aid with increase tolerance to moderate activities at home  3/10 pain reported today. 7/28/22  2. Patient will have FOTO improved to projected gains 60 to aid with increase tolerance to ADLS and activities at home and in the community  PN  ongoing NA 7/28/22  3. Patient will have tolerance to walking >/= 20 minutes to aid with increase tolerance to ADL's and activities at home  PN  held TM today due to footwear,   7/21/22    Frequency / Duration: Patient to be seen 2 times per week for 4 weeks:    Assessment / Recommendations:Pt reports she is still having numbness at right LE. Pt completed each strengthening and flexibility there ex well with cues for correct form. Pt benefited with treatment due to no pain. Pt is making some gains with PT since initial eval.  Patient will continue to benefit from skilled PT services to modify and progress therapeutic interventions, address functional mobility deficits, address ROM deficits, address strength deficits, analyze and address soft tissue restrictions, analyze and cue movement patterns, assess and modify postural abnormalities, and instruct in home and community integration to attain remaining goals. Certification Period: 7/29/22 - 8/27/22    Leydi Love, PT 8/2/2022 3:10 PM    ________________________________________________________________________  I certify that the above Therapy Services are being furnished while the patient is under my care.  I agree with the treatment plan and certify that this therapy is necessary. [] I have read the above and request that my patient continue as recommended.   [] I have read the above report and request that my patient continue therapy with the following changes/special instructions: _____________________________________________  [] I have read the above report and request that my patient be discharged from therapy    Physician's Signature:____________Date:_________TIME:________     Yadira Cruz MD  ** Signature, Date and Time must be completed for valid certification **    Please sign and return to In [address] [place] [address] [address]  [address] [place] [address], [address] [place] [address],[facility] 300 [phone] fax

## 2022-08-04 ENCOUNTER — APPOINTMENT (OUTPATIENT)
Dept: PHYSICAL THERAPY | Age: 77
End: 2022-08-04
Attending: PHYSICAL MEDICINE & REHABILITATION
Payer: MEDICARE

## 2022-08-04 ENCOUNTER — TELEPHONE (OUTPATIENT)
Dept: PHYSICAL THERAPY | Age: 77
End: 2022-08-04

## 2022-08-24 ENCOUNTER — HOSPITAL ENCOUNTER (OUTPATIENT)
Dept: PHYSICAL THERAPY | Age: 77
Discharge: HOME | End: 2022-08-24
Attending: PHYSICAL MEDICINE & REHABILITATION
Payer: MEDICARE

## 2022-08-24 ENCOUNTER — OFFICE VISIT (OUTPATIENT)
Dept: ORTHOPEDIC SURGERY | Age: 77
End: 2022-08-24
Payer: MEDICARE

## 2022-08-24 VITALS
WEIGHT: 228 LBS | OXYGEN SATURATION: 100 % | HEIGHT: 62 IN | HEART RATE: 76 BPM | BODY MASS INDEX: 41.96 KG/M2 | TEMPERATURE: 97.4 F | RESPIRATION RATE: 18 BRPM

## 2022-08-24 DIAGNOSIS — M54.16 RIGHT LUMBAR RADICULITIS: ICD-10-CM

## 2022-08-24 DIAGNOSIS — M48.062 LUMBAR STENOSIS WITH NEUROGENIC CLAUDICATION: Primary | ICD-10-CM

## 2022-08-24 PROCEDURE — 1101F PT FALLS ASSESS-DOCD LE1/YR: CPT | Performed by: PHYSICAL MEDICINE & REHABILITATION

## 2022-08-24 PROCEDURE — 97110 THERAPEUTIC EXERCISES: CPT

## 2022-08-24 PROCEDURE — 1123F ACP DISCUSS/DSCN MKR DOCD: CPT | Performed by: PHYSICAL MEDICINE & REHABILITATION

## 2022-08-24 PROCEDURE — G8756 NO BP MEASURE DOC: HCPCS | Performed by: PHYSICAL MEDICINE & REHABILITATION

## 2022-08-24 PROCEDURE — G8427 DOCREV CUR MEDS BY ELIG CLIN: HCPCS | Performed by: PHYSICAL MEDICINE & REHABILITATION

## 2022-08-24 PROCEDURE — 99214 OFFICE O/P EST MOD 30 MIN: CPT | Performed by: PHYSICAL MEDICINE & REHABILITATION

## 2022-08-24 PROCEDURE — G8432 DEP SCR NOT DOC, RNG: HCPCS | Performed by: PHYSICAL MEDICINE & REHABILITATION

## 2022-08-24 PROCEDURE — 1090F PRES/ABSN URINE INCON ASSESS: CPT | Performed by: PHYSICAL MEDICINE & REHABILITATION

## 2022-08-24 PROCEDURE — 97530 THERAPEUTIC ACTIVITIES: CPT

## 2022-08-24 PROCEDURE — G8536 NO DOC ELDER MAL SCRN: HCPCS | Performed by: PHYSICAL MEDICINE & REHABILITATION

## 2022-08-24 PROCEDURE — G8417 CALC BMI ABV UP PARAM F/U: HCPCS | Performed by: PHYSICAL MEDICINE & REHABILITATION

## 2022-08-24 PROCEDURE — G8399 PT W/DXA RESULTS DOCUMENT: HCPCS | Performed by: PHYSICAL MEDICINE & REHABILITATION

## 2022-08-24 RX ORDER — GABAPENTIN 100 MG/1
100 CAPSULE ORAL 3 TIMES DAILY
Qty: 90 CAPSULE | Refills: 1 | Status: SHIPPED | OUTPATIENT
Start: 2022-08-24

## 2022-08-24 NOTE — PROGRESS NOTES
[facility] [address], [address]  [facility], [address]  Phone: [phone]  Fax: [phone]        Ryley Valdovinos  : 1945  PCP: Lydia Huertas MD    PROGRESS NOTE      ASSESSMENT AND PLAN    Diagnoses and all orders for this visit:    1. Lumbar stenosis with neurogenic claudication  -     gabapentin (NEURONTIN) 100 mg capsule; Take 1 Capsule by mouth three (3) times daily. Max Daily Amount: 300 mg.    2. Right lumbar radiculitis  -     gabapentin (NEURONTIN) 100 mg capsule; Take 1 Capsule by mouth three (3) times daily. Max Daily Amount: 300 mg. Yogi Graf is a 76 y.o. female with lumbar stenosis and sciatica. Partial benefit with PT. We will add very low-dose gabapentin. Discussed injections as a treatment option. Pt would like to hold off for now. Trial of Gabapentin 100 mg TID  Continue Voltaren Gel and PRN Ibuprofen    Follow-up and Dispositions    Return in about 2 months (around 10/24/2022) for medication management. HISTORY OF PRESENT ILLNESS      Yogi Graf is a 76 y.o. female presents for follow up of back pain. Pt reports low back pain radiating into her right buttock and posterolateral thigh. Her pain is increased with walking and standing. Positive shopping cart sign. She has numbness and tingling in her right buttock and thigh. She uses Diclofenac Gel and Ibuprofen as needed with some benefit. Denies side effects. She is currently in PT with benefit. Pain Assessment  2022   Location of Pain Hip   Location Modifiers -   Severity of Pain 4   Quality of Pain Dull; Sharp   Quality of Pain Comment -   Duration of Pain Persistent   Frequency of Pain Constant   Aggravating Factors Walking;Standing   Aggravating Factors Comment -   Limiting Behavior Some   Relieving Factors Other (Comment)   Relieving Factors Comment medication   Result of Injury -         Onset of pain: injury 2021 Investigations:   L MRI 2/2022: stenosis L3/4/5, FA L5-S1  Spine surgery consult: none     Treatments:  Physical therapy: 7-8/2022 - some benefit  Spinal injections: no  Spinal surgery- no  Beneficial medications: none  Failed medications: none     Work Status: retired. Former   Pertinent PMHx:  DM, HTN, CKD, .         PHYSICAL EXAMINATION    Visit Vitals  Pulse 76   Temp 97.4 °F (36.3 °C) (Temporal)   Resp 18   Ht 5' 2\" (1.575 m)   Wt 228 lb (103.4 kg)   SpO2 100%   BMI 41.70 kg/m²       TTP L4-5, right sciatic notch  LE strength intact  SLR negative                Written by Krzysztof Greenlee, as dictated by Eva Justice MD.

## 2022-08-24 NOTE — PROGRESS NOTES
PT DAILY TREATMENT NOTE     Patient Name: Daniela Ochoa  Date:2022  : 1945  [x]  Patient  Verified  Payor: Luis A Bob / Plan: VA MEDICARE PART A & B / Product Type: Medicare /    In time: 310 pm Out time: 347 pm   Total Treatment Time (min): 37  Visit #: 2 of 8    Medicare/BCBS Only   Total Timed Codes (min): 37 1:1 Treatment Time:  37       Treatment Area: Other low back pain [M54.59]    SUBJECTIVE  Pain Level (0-10 scale): 4/10   Any medication changes, allergies to medications, adverse drug reactions, diagnosis change, or new procedure performed?: [x] No    [] Yes (see summary sheet for update)  Subjective functional status/changes:   [] No changes reported  Patient reports that her legs are feeling better but she still has pain her buttocks. Patient states that she went to the MD today and he gave her some more meds to take. OBJECTIVE    15 min Therapeutic Exercise:  [x] See flow sheet :   Rationale: increase ROM and increase strength to improve the patients overall muscle endurance and activity tolerance for ADL's and functional tasks. 22 min Therapeutic Activity:  [x]  See flow sheet :FOTO and goals assessed   Rationale: safely perform dynamic activities and to improve functional performance of  to improve the patients ability to safely perform dynamic activities and to improve functional performance of ADL's.       min Neuromuscular Re-education:  []  See flow sheet :   Rationale: increase strength, improve coordination, improve balance and increase proprioception  to improve the patients ability to perform activities with good form, stability and proprioception. min Manual Therapy: The manual therapy interventions were performed at a separate and distinct time from the therapeutic activities interventions. Rationale: decrease pain, increase ROM and increase postural awareness to assist with performing ADL's with ease. With   [] TE   [] TA   [] neuro   [] other: Patient Education: [x] Review HEP    [] Progressed/Changed HEP based on:   [] positioning   [] body mechanics   [] transfers   [] heat/ice application    [] other:      Other Objective/Functional Measures:   FOTO Assessment score: 51 points     Functional Gains: Sleeping   Functional Deficits: walking/standing tolerance, performing moderate activities  % improvement: 65% improvement   Pain   Average: 5/10       Best: 2-3/10     Worst: 7/10  Patient Goal: \"To be able to walk again without extreme limitations. \"     Pain Level (0-10 scale) post treatment: 2-3/10    ASSESSMENT/Changes in Function:   Patient has missed several weeks of PT due to scheduling conflicts. Patient's FOTO assessment score has improved since last assessed. Patient still has increased (B) LE radicular symptoms with standing and walking for prolonged periods of time. Patient also reports increased low back pain with moderate/heavy activities at home. Patient reports 65% improvement with PT and would like to continue in order to return to [facility]. Patient will continue to benefit from skilled PT services to modify and progress therapeutic interventions, address functional mobility deficits, address ROM deficits, address strength deficits, analyze and address soft tissue restrictions, analyze and cue movement patterns, analyze and modify body mechanics/ergonomics, assess and modify postural abnormalities and instruct in home and community integration to attain remaining goals. []  See Plan of Care  [x]  See progress note/recertification  []  See Discharge Summary         Progress towards goals / Updated goals:  Goals for this certification period to be accomplished in 4 weeks: 1. Patient will have pain 1-2/10 to aid with increase tolerance to moderate activities at home  PN  8-9  CURRENT- Patient reports 5/10 pain with moderate activities. REMAINS    2.  Patient will have FOTO improved to projected gains 60 to aid with increase tolerance to ADLS and activities at home and in the community  PN  40    CURRENT- 51 points. REMAINS    3. Patient will have tolerance to walking >/= 20 minutes to aid with increase tolerance to ADL's and activities at home  PN  15 min, minimal pain reported (6/2/2022)  and TM 4 minutes speed . 6 mph 6/29/22  Progressing towards  CURRENT- Patient reports walking tolerance to be 5 minutes. REMAINS.      PLAN  [x]  Upgrade activities as tolerated     [x]  Continue plan of care  []  Update interventions per flow sheet       []  Discharge due to:_  []  Other:     Lucien Lanier, SHAKIR 8/24/2022  4:58 PM    Future Appointments   Date Time Provider Jose Morales   10/20/2022  1:00 PM Daniella Esteves MD [facility]

## 2022-08-24 NOTE — LETTER
8/26/2022    Patient: Geovanna Clayton   YOB: 1945   Date of Visit: 8/24/2022     Agata Houser MD  [address] [place] [id number]  Via Fax: [phone]    Dear Agata Houser MD,      Thank you for referring Ms. Pamela Wright to [facility] for evaluation. My notes for this consultation are attached. If you have questions, please do not hesitate to call me. I look forward to following your patient along with you.       Sincerely,    Amraan Schwarz MD

## 2022-08-25 NOTE — PROGRESS NOTES
[facility] [address], [address] [place] [address], [address]  [phone] fax      Continued Plan of Care/ Re-certification for Physical Therapy Services    Patient name: Chicho Rankin Start of Care: 22   Referral source: Bernie Collier MD : 1945   Medical/Treatment Diagnosis: Other low back pain [M54.59]  Payor: Nathaly Schmitt / Plan: VA MEDICARE PART A & B / Product Type: Medicare /  Onset Date:a few months     Prior Hospitalization: see medical history Provider#: [id number]   Medications: Verified on Patient Summary List    Comorbidities: DM, HTN, Hearing impaired   Prior Level of Function: :I all areas of ADLs and activities, No AD use, able to tolerate household and community activities    Visits from Start of Care: 23    Missed Visits: 5    The Plan of Care and following information is based on the patient's current status: 1. Patient will have pain 1-2/10 to aid with increase tolerance to moderate activities at home  PN  8-9  CURRENT- Patient reports 5/10 pain with moderate activities. REMAINS     2. Patient will have FOTO improved to projected gains 60 to aid with increase tolerance to ADLS and activities at home and in the community  PN  40    CURRENT- 51 points. REMAINS     3. Patient will have tolerance to walking >/= 20 minutes to aid with increase tolerance to ADL's and activities at home  PN  15 min, minimal pain reported (2022)  and TM 4 minutes speed . 6 mph 22  Progressing towards  CURRENT- Patient reports walking tolerance to be 5 minutes. REMAINS.         Key functional changes: improved FOTO,        Problems/ barriers to goal attainment: residual pain      Problem List: pain affecting function, decrease ROM, decrease strength, decrease ADL/ functional abilitiies, decrease activity tolerance, decrease flexibility/ joint mobility, and other FOTO 51    Treatment Plan: Therapeutic exercise, Therapeutic activities, Neuromuscular re-education, Physical agent/modality, Manual therapy, Patient education, Self Care training, Home safety training, and Stair training     Patient Goal (s) has been updated and includes: \"To be able to walk again without extreme limitations. \"      Goals for this certification period to be accomplished in 4 weeks: 1. Patient will have pain 1-2/10 to aid with increase tolerance to moderate activities at home  PN - Patient reports 5/10 pain with moderate activities. 2. Patient will have FOTO improved to projected gains 60 to aid with increase tolerance to ADLS and activities at home and in the community  PN  51 points. 3. Patient will have tolerance to walking >/= 20 minutes to aid with increase tolerance to ADL's and activities at home  PN  Patient reports walking tolerance to be 5 minutes. .     Frequency / Duration: Patient to be seen 2 times per week for 4 weeks:    Assessment / Recommendations:Patient has missed several weeks of PT due to scheduling conflicts. Patient's FOTO assessment score has improved since last assessed. Patient still has increased (B) LE radicular symptoms with standing and walking for prolonged periods of time. Patient also reports increased low back pain with moderate/heavy activities at home. Patient reports 65% improvement with PT and would like to continue in order to return to [facility]. Patient will continue to benefit from skilled PT services to modify and progress therapeutic interventions, address functional mobility deficits, address ROM deficits, address strength deficits, analyze and address soft tissue restrictions, analyze and cue movement patterns, analyze and modify body mechanics/ergonomics, assess and modify postural abnormalities and instruct in home and community integration to attain remaining goals.        Certification Period: 8/28/22- 9/26/22    Samreen Browning, PT 8/25/2022 9:38 AM    ________________________________________________________________________  I certify that the above Therapy Services are being furnished while the patient is under my care. I agree with the treatment plan and certify that this therapy is necessary. [] I have read the above and request that my patient continue as recommended.   [] I have read the above report and request that my patient continue therapy with the following changes/special instructions: _____________________________________________  [] I have read the above report and request that my patient be discharged from therapy    Physician's Signature:____________Date:_________TIME:________     Ilda Jay MD  ** Signature, Date and Time must be completed for valid certification **    Please sign and return to In [address] [place] [address], [address] [place] [address], [address] [place] [address],[facility] 300  [phone] fax

## 2022-09-07 ENCOUNTER — APPOINTMENT (OUTPATIENT)
Dept: PHYSICAL THERAPY | Age: 77
End: 2022-09-07
Attending: PHYSICAL MEDICINE & REHABILITATION
Payer: MEDICARE

## 2022-09-07 ENCOUNTER — TELEPHONE (OUTPATIENT)
Dept: PHYSICAL THERAPY | Age: 77
End: 2022-09-07

## 2022-09-09 ENCOUNTER — HOSPITAL ENCOUNTER (OUTPATIENT)
Dept: PHYSICAL THERAPY | Age: 77
Discharge: HOME | End: 2022-09-09
Attending: PHYSICAL MEDICINE & REHABILITATION
Payer: MEDICARE

## 2022-09-09 PROCEDURE — 97530 THERAPEUTIC ACTIVITIES: CPT

## 2022-09-09 PROCEDURE — 97110 THERAPEUTIC EXERCISES: CPT

## 2022-09-09 NOTE — PROGRESS NOTES
PT DAILY TREATMENT NOTE     Patient Name: Rosalinda Trevino  Date:2022  : 1945  [x]  Patient  Verified  Payor: [place] MED[place]ARE / Plan: [place] MEDICARE PART A & B / Product Type: Medicare /    In time:3:06  Out time:3:55  Total Treatment Time (min): 49  Visit #: 3 of 8    Medicare/BCBS Only   Total Timed Codes (min):   1:1 Treatment Time:         Treatment Area: Other low back pain [M54.59]    SUBJECTIVE  Pain Level (0-10 scale): 4-5  Any medication changes, allergies to medications, adverse drug reactions, diagnosis change, or new procedure performed?: [x] No    [] Yes (see summary sheet for update)  Subjective functional status/changes:   [] No changes reported  \"Its one of those days. \"    OBJECTIVE    Modality rationale: decrease pain and increase tissue extensibility to improve the patients ability to perform ADL    Min Type Additional Details    [] Estim:  []Unatt       []IFC  []Premod                        []Other:  []w/ice   []w/heat  Position:  Location:    [] Estim: []Att    []TENS instruct  []NMES                    []Other:  []w/US   []w/ice   []w/heat  Position:  Location:    []  Traction: [] Cervical       []Lumbar                       [] Prone          []Supine                       []Intermittent   []Continuous Lbs:  [] before manual  [] after manual    []  Ultrasound: []Continuous   [] Pulsed                           []1MHz   []3MHz W/cm2:  Location:    []  Iontophoresis with dexamethasone         Location: [] Take home patch   [] In clinic    []  Ice     []  heat  []  Ice massage  []  Laser   []  Anodyne Position:  Location:    []  Laser with stim  []  Other:  Position:  Location:    []  Vasopneumatic Device    []  Right     []  Left  Pre-treatment girth:  Post-treatment girth:  Measured at (location):  Pressure:       [] lo [] med [] hi   Temperature: [] lo [] med [] hi   [x] Skin assessment post-treatment:  [x]intact []redness- no adverse reaction    []redness - adverse reaction: min []Eval                  []Re-Eval       28 min Therapeutic Exercise:  [x] See flow sheet :   Rationale: increase ROM and increase strength to improve the patients ability to take care of     21 min Therapeutic Activity:  [x]  See flow sheet :   Rationale: increase ROM, increase strength, and improve coordination  to improve the patients ability to perform daily task at home with no difficulty      min Neuromuscular Re-education:  []  See flow sheet :   Rationale: increase ROM, increase strength, improve coordination, improve balance, and increase proprioception  to improve the patients ability to ascend/descend     min Manual Therapy: The manual therapy interventions were performed at a separate and distinct time from the therapeutic activities interventions. Rationale: decrease pain, increase ROM, increase tissue extensibility, decrease edema , decrease trigger points, and increase postural awareness to perform ADL      min Gait Training:  ___ feet with ___ device on level surfaces with ___ level of assist   Rationale: With   [] TE   [] TA   [] neuro   [] other: Patient Education: [x] Review HEP    [] Progressed/Changed HEP based on:   [] positioning   [] body mechanics   [] transfers   [] heat/ice application    [] other:      Other Objective/Functional Measures:  Increased rep per flow sheet    Pain Level (0-10 scale) post treatment: 2    ASSESSMENT/Changes in Function: Pt responded well to each strengthening and flexibility there ex with decreased pain with cues for correct form and posture. Overall pt is progressing towards all  updated goals.     Patient will continue to benefit from skilled PT services to modify and progress therapeutic interventions, address functional mobility deficits, address ROM deficits, address strength deficits, analyze and address soft tissue restrictions, analyze and cue movement patterns, analyze and modify body mechanics/ergonomics, assess and modify postural abnormalities, and instruct in home and community integration to attain remaining goals. []  See Plan of Care  [x]  See progress note/recertification  []  See Discharge Summary         Progress towards goals / Updated goals:  accomplished in 4 weeks: 1. Patient will have pain 1-2/10 to aid with increase tolerance to moderate activities at home  PN  8-9  CURRENT- Patient reports 5/10 pain with moderate activities. REMAINS  9/9/22     2. Patient will have FOTO improved to projected gains 60 to aid with increase tolerance to ADLS and activities at home and in the community  PN  40    CURRENT- 51 points. REMAINS     3. Patient will have tolerance to walking >/= 20 minutes to aid with increase tolerance to ADL's and activities at home  PN  15 min, minimal pain reported (6/2/2022)  and TM 4 minutes speed . 6 mph 6/29/22  Progressing towards  CURRENT- Patient reports walking tolerance to be 5 minutes. REMAINS.      PLAN  []  Upgrade activities as tolerated     [x]  Continue plan of care  []  Update interventions per flow sheet       []  Discharge due to:_  []  Other:_      Nilsa Mcconnell PTA 9/9/2022  3:27 PM    Future Appointments   Date Time Provider Jose Morales   9/14/2022  3:00 PM Javier Bound, PTA [facility]   9/16/2022  3:00 PM Moinque Matos, PT [facility]   9/20/2022  3:00 PM Javier Bound, PTA [facility]   9/22/2022  3:00 PM Javier Bound, PTA [facility]   9/27/2022  3:45 PM Monique Matos, PT [facility]   9/29/2022  3:45 PM Monique Matos, PT [facility]   10/20/2022  1:00 PM Awa Parikh MD [facility]

## 2022-09-14 ENCOUNTER — HOSPITAL ENCOUNTER (OUTPATIENT)
Dept: PHYSICAL THERAPY | Age: 77
Discharge: HOME | End: 2022-09-14
Attending: PHYSICAL MEDICINE & REHABILITATION
Payer: MEDICARE

## 2022-09-14 PROCEDURE — 97530 THERAPEUTIC ACTIVITIES: CPT

## 2022-09-14 PROCEDURE — 97112 NEUROMUSCULAR REEDUCATION: CPT

## 2022-09-14 PROCEDURE — 97110 THERAPEUTIC EXERCISES: CPT

## 2022-09-14 NOTE — PROGRESS NOTES
PT DAILY TREATMENT NOTE     Patient Name: Kalin Rawls  DHQD:  : 1945  [x]  Patient  Verified  Payor: Cris Escobedo / Plan: [place] MEDICARE PART A & B / Product Type: Medicare /    In time: 300 pm Out time: 350 pm   Total Treatment Time (min): 50  Visit #: 2 of 8    Medicare/BCBS Only   Total Timed Codes (min): 50 1:1 Treatment Time:  50       Treatment Area: Other low back pain [M54.59]    SUBJECTIVE  Pain Level (0-10 scale): 2/10   Any medication changes, allergies to medications, adverse drug reactions, diagnosis change, or new procedure performed?: [x] No    [] Yes (see summary sheet for update)  Subjective functional status/changes:   [] No changes reported  Patient reports that her back is feeling better overall and not having as much pain today. \"I can mainly feel it in my hips and glutes. \"     OBJECTIVE    20 min Therapeutic Exercise:  [x] See flow sheet :   Rationale: increase ROM and increase strength to improve the patients overall muscle endurance and activity tolerance for ADL's and functional tasks. 20 min Therapeutic Activity:  [x]  See flow sheet :FOTO and goals assessed   Rationale: safely perform dynamic activities and to improve functional performance of  to improve the patients ability to safely perform dynamic activities and to improve functional performance of ADL's. 10 min Neuromuscular Re-education:  []  See flow sheet :   Rationale: increase strength, improve coordination, improve balance and increase proprioception  to improve the patients ability to perform activities with good form, stability and proprioception. min  Manual Therapy: The manual therapy interventions were performed at a separate and distinct time from the therapeutic activities interventions. Rationale: decrease pain, increase ROM and increase postural awareness to assist with performing ADL's with ease.                                                               With   [] TE   [] TA   [] neuro   [] other: Patient Education: [x] Review HEP    [] Progressed/Changed HEP based on:   [] positioning   [] body mechanics   [] transfers   [] heat/ice application    [] other:      Other Objective/Functional Measures:     Pain Level (0-10 scale) post treatment: 0/10    ASSESSMENT/Changes in Function:   Patient is progressing well towards goals. Patient performed exercises, as per flow sheet, to assist with increasing low back strength and flexibility. Patient tolerated today's session with no increase in low back pain or radicular symptoms. Will continue to progress patient as tolerated. Patient will continue to benefit from skilled PT services to modify and progress therapeutic interventions, address functional mobility deficits, address ROM deficits, address strength deficits, analyze and address soft tissue restrictions, analyze and cue movement patterns, analyze and modify body mechanics/ergonomics, assess and modify postural abnormalities and instruct in home and community integration to attain remaining goals. []  See Plan of Care  []  See progress note/recertification  []  See Discharge Summary         Progress towards goals / Updated goals: 1. Patient will have pain 1-2/10 to aid with increase tolerance to moderate activities at home  PN - Patient reports 5/10 pain with moderate activities. Current: Patient reports 2/10 pain today. 9/14/2022     2. Patient will have FOTO improved to projected gains 60 to aid with increase tolerance to ADLS and activities at home and in the community  PN  51 points. Current: Ongoing, will assess at next progress note. 9/14/2022     3. Patient will have tolerance to walking >/= 20 minutes to aid with increase tolerance to ADL's and activities at home  PN  Patient reports walking tolerance to be 5 minutes.      PLAN  [x]  Upgrade activities as tolerated     [x]  Continue plan of care  []  Update interventions per flow sheet       []  Discharge due to:_  [] Other:     Edyth Stage, PTA 9/14/2022  4:58 PM    Future Appointments   Date Time Provider Jose Morales   9/16/2022  3:00 PM Doyce Caller, PT [facility]   9/20/2022  3:00 PM Michela Carrasquillo, PTA [facility]   9/22/2022  3:00 PM Michela Carrasquillo, PTA [facility]   9/27/2022  3:45 PM Doyce Caller, PT [facility]   9/29/2022  3:45 PM Doyce Caller, PT [facility]   10/20/2022  1:00 PM Adina Fan MD [facility]

## 2022-09-16 ENCOUNTER — HOSPITAL ENCOUNTER (OUTPATIENT)
Dept: PHYSICAL THERAPY | Age: 77
Discharge: HOME | End: 2022-09-16
Attending: PHYSICAL MEDICINE & REHABILITATION
Payer: MEDICARE

## 2022-09-16 PROCEDURE — 97112 NEUROMUSCULAR REEDUCATION: CPT

## 2022-09-16 PROCEDURE — 97530 THERAPEUTIC ACTIVITIES: CPT

## 2022-09-16 PROCEDURE — 97110 THERAPEUTIC EXERCISES: CPT

## 2022-09-16 NOTE — PROGRESS NOTES
PT DAILY TREATMENT NOTE     Patient Name: Fernie Casper  Date:2022  : 1945  [x]  Patient  Verified  Payor: Jessica Reina / Plan: [place] MEDICARE PART A & B / Product Type: Medicare /    In time:304  Out time:346  Total Treatment Time (min): 42  Visit #: 3 of 8    Medicare/BCBS Only   Total Timed Codes (min):  42 1:1 Treatment Time:  42       Treatment Area: Other low back pain [M54.59]    SUBJECTIVE  Pain Level (0-10 scale): 5  Any medication changes, allergies to medications, adverse drug reactions, diagnosis change, or new procedure performed?: [x] No    [] Yes (see summary sheet for update)  Subjective functional status/changes:   [] No changes reported  Reports today she is having a little bit of pain in her lower back.      OBJECTIVE    Modality rationale:     Min Type Additional Details    [] Estim:  []Unatt       []IFC  []Premod                        []Other:  []w/ice   []w/heat  Position:  Location:    [] Estim: []Att    []TENS instruct  []NMES                    []Other:  []w/US   []w/ice   []w/heat  Position:  Location:    []  Traction: [] Cervical       []Lumbar                       [] Prone          []Supine                       []Intermittent   []Continuous Lbs:  [] before manual  [] after manual    []  Ultrasound: []Continuous   [] Pulsed                           []1MHz   []3MHz W/cm2:  Location:    []  Iontophoresis with dexamethasone         Location: [] Take home patch   [] In clinic   PD []  Ice     []  heat  []  Ice massage  []  Laser   []  Anodyne Position:  Location:    []  Laser with stim  []  Other:  Position:  Location:    []  Vasopneumatic Device    []  Right     []  Left  Pre-treatment girth:  Post-treatment girth:  Measured at (location):  Pressure:       [] lo [] med [] hi   Temperature: [] lo [] med [] hi   [] Skin assessment post-treatment:  []intact []redness- no adverse reaction    []redness - adverse reaction:          19 min Therapeutic Exercise:  [x] See flow sheet :   Rationale: increase ROM, increase strength, improve coordination, improve balance, and increase proprioception to improve the patients ability to tolerate ADLS and activities    8 min Therapeutic Activity:  [x]  See flow sheet :   Rationale: increase ROM, increase strength, improve coordination, improve balance, and increase proprioception  to improve the patients ability to tolerate ADLS and activities     15 min Neuromuscular Re-education:  [x]  See flow sheet :   Rationale: increase ROM, increase strength, improve coordination, improve balance, and increase proprioception  to improve the patients ability to tolerate ADLs and activities     With   [] TE   [] TA   [] neuro   [] other: Patient Education: [x] Review HEP    [] Progressed/Changed HEP based on:   [] positioning   [] body mechanics   [] transfers   [] heat/ice application    [] other:      Other Objective/Functional Measures: VC exercises and tecnnique     Pain Level (0-10 scale) post treatment: 5    ASSESSMENT/Changes in Function: tolerated well with reports of moderate LBP today. She declined MH post session. Time spent reviewing some exercises to address posture which she expressed some concern about including Tband rows and issued blue band for HEP. Direction preference of flexion. Patient will continue to benefit from skilled PT services to modify and progress therapeutic interventions, address ROM deficits, address strength deficits, analyze and address soft tissue restrictions, analyze and cue movement patterns, analyze and modify body mechanics/ergonomics, assess and modify postural abnormalities, address imbalance/dizziness, and instruct in home and community integration to attain remaining goals. [x]  See Plan of Care  [x]  See progress note/recertification  []  See Discharge Summary         Progress towards goals / Updated goals: 1. Patient will have pain 1-2/10 to aid with increase tolerance to moderate activities at home  PN - Patient reports 5/10 pain with moderate activities. Current: Patient reports 2/10 pain today. 9/14/2022     2. Patient will have FOTO improved to projected gains 60 to aid with increase tolerance to ADLS and activities at home and in the community  PN  51 points. Current: Ongoing, will assess at next progress note. 9/14/2022     3. Patient will have tolerance to walking >/= 20 minutes to aid with increase tolerance to ADL's and activities at home  PN  Patient reports walking tolerance to be 5 minutes.     PLAN  [x]  Upgrade activities as tolerated     [x]  Continue plan of care  []  Update interventions per flow sheet       []  Discharge due to:_  []  Other:_      Jose F Bazan PT 9/16/2022  3:23 PM    Future Appointments   Date Time Provider Jose Morales   9/20/2022  3:00 PM Edmund Pagan PTA [facility]   9/22/2022  3:00 PM Edmund Pagan PTA [facility]   9/27/2022  3:45 PM Bartolo Payton, PT [facility]   9/29/2022  3:45 PM Bartolo Payton, PT [facility]   10/20/2022  1:00 PM Zoila Johnson MD [facility]

## 2022-09-20 ENCOUNTER — HOSPITAL ENCOUNTER (OUTPATIENT)
Dept: PHYSICAL THERAPY | Age: 77
Discharge: HOME | End: 2022-09-20
Attending: PHYSICAL MEDICINE & REHABILITATION
Payer: MEDICARE

## 2022-09-20 PROCEDURE — 97530 THERAPEUTIC ACTIVITIES: CPT

## 2022-09-20 PROCEDURE — 97112 NEUROMUSCULAR REEDUCATION: CPT

## 2022-09-20 PROCEDURE — 97110 THERAPEUTIC EXERCISES: CPT

## 2022-09-20 NOTE — PROGRESS NOTES
PT DAILY TREATMENT NOTE     Patient Name: Yumiko Garcia  Date:2022  : 1945  [x]  Patient  Verified  Payor: [employer] / Plan: [place] MEDICARE PART A & B / Product Type: Medicare /    In time: 308 pm Out time: 405 pm   Total Treatment Time (min): 57  Visit #: 4 of 8    Medicare/BCBS Only   Total Timed Codes (min): 47  1:1 Treatment Time:  47        Treatment Area: Other low back pain [M54.59]    SUBJECTIVE  Pain Level (0-10 scale): 3/10   Any medication changes, allergies to medications, adverse drug reactions, diagnosis change, or new procedure performed?: [x] No    [] Yes (see summary sheet for update)  Subjective functional status/changes:   [] No changes reported  \"My back is feeling a lot better. I have been doing the exercises at home. \"     OBJECTIVE         Modality rationale: decrease pain and increase tissue extensibility to improve the patients ability to assist with performing ADL's and functional tasks without limitations.    Min Type Additional Details      [] Estim:  []Unatt       []IFC  []Premod                        []Other:  []w/ice   []w/heat  Position:  Location:      [] Estim: []Att    []TENS instruct  []NMES                    []Other:  []w/US   []w/ice   []w/heat  Position:  Location:      []  Traction: [] Cervical       []Lumbar                       [] Prone          []Supine                       []Intermittent   []Continuous Lbs:  [] before manual  [] after manual      []  Ultrasound: []Continuous   [] Pulsed                           []1MHz   []3MHz W/cm2:  Location:      []  Iontophoresis with dexamethasone         Location: [] Take home patch   [] In clinic    10 []  Ice     [x]  heat  []  Ice massage  []  Laser   []  Anodyne Position:reclined   Location:L/S      []  Laser with stim  []  Other:  Position:  Location:      []  Vasopneumatic Device    []  Right     []  Left  Pre-treatment girth:  Post-treatment girth:  Measured at (location):  Pressure:       [] lo [] med [] hi   Temperature: [] lo [] med [] hi    [] Skin assessment post-treatment:  []intact []redness- no adverse reaction  []redness - adverse reaction:      22 min Therapeutic Exercise:  [x] See flow sheet :   Rationale: increase ROM and increase strength to improve the patients overall muscle endurance and activity tolerance for ADL's and functional tasks. 15 min Therapeutic Activity:  [x]  See flow sheet:   Rationale: increase ROM and increase strength to improve the patients ability to safely perform dynamic activities and to improve functional performance of ADL's. 10 min Neuromuscular Re-education:  []  See flow sheet :   Rationale: increase strength, improve coordination, improve balance and increase proprioception  to improve the patients ability to perform activities with good form, stability and proprioception. min  Manual Therapy: The manual therapy interventions were performed at a separate and distinct time from the therapeutic activities interventions. Rationale: decrease pain, increase ROM and increase postural awareness to assist with performing ADL's with ease. With   [] TE   [] TA   [] neuro   [] other: Patient Education: [x] Review HEP    [] Progressed/Changed HEP based on:   [] positioning   [] body mechanics   [] transfers   [] heat/ice application    [] other:      Other Objective/Functional Measures: Added DL leg press    Pain Level (0-10 scale) post treatment: 0/10    ASSESSMENT/Changes in Function:   Patient is progressing well towards goals. Progressed exercises, as per flow sheet, to assist with increasing low back (B) LE strength. Patient tolerated today's progressed exercises well. No increase in low back pain was experienced during today's session. Will continue to progress patient as tolerated.  Patient will continue to benefit from skilled PT services to modify and progress therapeutic interventions, address functional mobility deficits, address ROM deficits, address strength deficits, analyze and address soft tissue restrictions, analyze and cue movement patterns, analyze and modify body mechanics/ergonomics, assess and modify postural abnormalities and instruct in home and community integration to attain remaining goals. []  See Plan of Care  []  See progress note/recertification  []  See Discharge Summary         Progress towards goals / Updated goals: 1. Patient will have pain 1-2/10 to aid with increase tolerance to moderate activities at home  PN - Patient reports 5/10 pain with moderate activities. Current: Patient reports 3/10 pain today. 9/20/2022     2. Patient will have FOTO improved to projected gains 60 to aid with increase tolerance to ADLS and activities at home and in the community  PN  51 points. Current: Ongoing, will assess at next progress note. 9/20/2022     3. Patient will have tolerance to walking >/= 20 minutes to aid with increase tolerance to ADL's and activities at home  PN  Patient reports walking tolerance to be 5 minutes.      PLAN  [x]  Upgrade activities as tolerated     [x]  Continue plan of care  []  Update interventions per flow sheet       []  Discharge due to:_  []  Other:     Jeremy Ulloa PTA 9/20/2022  4:58 PM    Future Appointments   Date Time Provider Jose Morales   9/22/2022  3:00 PM Mari Andersen PTA [facility]   9/27/2022  3:45 PM Swati Roque, PT [facility]   9/29/2022  3:45 PM Swati Roque PT [facility]   10/20/2022  1:00 PM Juwan Mercado MD [facility]

## 2022-09-22 ENCOUNTER — HOSPITAL ENCOUNTER (OUTPATIENT)
Dept: PHYSICAL THERAPY | Age: 77
Discharge: HOME | End: 2022-09-22
Attending: PHYSICAL MEDICINE & REHABILITATION
Payer: MEDICARE

## 2022-09-22 PROCEDURE — 97530 THERAPEUTIC ACTIVITIES: CPT

## 2022-09-22 PROCEDURE — 97535 SELF CARE MNGMENT TRAINING: CPT

## 2022-09-22 NOTE — PROGRESS NOTES
PT DISCHARGE DAILY NOTE AND [id number]    Date:2022  Patient name: Eric Diego Start of Care: 22   Referral source: Melissa Escobedo MD : 1945   Medical/Treatment Diagnosis: Other low back pain [M54.59]  Payor: Tiffany Delarosa / Plan: [place] MED[place] & [place] / Product Type: Medicare /  Onset Date:a few months      Prior Hospitalization: see medical history Provider#: [id number]   Medications: Verified on Patient Summary List     Comorbidities: DM, HTN, Hearing impaired   Prior Level of Function: :I all areas of ADLs and activities, No AD use, able to tolerate household and community activities    Visits from Start of Care: 27  Missed Visits: 5    Reporting Period : 2022 to 2022    [x]  Patient  Verified  Payor: [place] MEDICARE / Plan: [place] MEDICARE PART A & B / Product Type: Medicare /    In time:307  Out time:347 pm   Total Treatment Time (min): 40  Visit #: 5 of 8    Medicare/BCBS Only   Total Timed Codes (min):  40 1:1 Treatment Time:  40       SUBJECTIVE  Pain Level (0-10 scale): 4-5/10   Any medication changes, allergies to medications, adverse drug reactions, diagnosis change, or new procedure performed?: [x] No    [] Yes (see summary sheet for update)  Subjective functional status/changes:   [] No changes reported  Patient reports feeling good today and denies any pain into her leg. OBJECTIVE    30 min Therapeutic Activity:  []  See flow sheet :   Rationale: increase strength and improve coordination  to improve the patients ability to safely perform dynamic activities and to improve functional performance of ADL's. 10 min Self Care/Home Management: Review and issue HEP    Rationale: increase ROM, increase strength, and improve coordination  to improve the patients ability to perform HEP safely and effectively at home.         With   [] TE   [] TA   [] neuro   [] other: Patient Education: [x] Review HEP    [] Progressed/Changed HEP based on:   [] positioning   [] body mechanics [] transfers   [] heat/ice application    [] other:      Other Objective/Functional Measures:      Pain Level (0-10 scale) post treatment: 0/10     Summary of Care:  Goal:Patient will have pain 1-2/10 to aid with increase tolerance to moderate activities at home  Status at last note/certification:Patient reports 5/10 pain with moderate activities  Status at discharge:Patient reports 2/10 pain with moderate activities. MET     Goal:Patient will have FOTO improved to projected gains 60 to aid with increase tolerance to ADLS and activities at home and in the community  Status at last note/certification:51 points  Status at discharge:40 points. NOT MET     Goal:Patient will have tolerance to walking >/= 20 minutes to aid with increase tolerance to ADL's and activities at home  Status at last note/certification:Patient reports walking tolerance to be 5 minutes  Status at discharge: Patient is able to tolerate 20 minutes of ambulation with no increase in signs/symptoms. MET     ASSESSMENT/Changes in Function:   Patient has progressed well with PT at this time. Patient is able to tolerate ADL's and other activities with no increase in low back pain or radicular symptoms. Patient reports being ready to progress to HEP. Reviewed HEP with patient. Patient verbalized understanding. Patient to be discharged at this time.     Thank you for this referral!     PLAN  [x]Discontinue therapy: [x]Patient has reached or is progressing toward set goals      []Patient is non-compliant or has abdicated      []Due to lack of appreciable progress towards set goals    Cornelia Angeles, SHAKIR 9/22/2022  3:18 PM

## 2022-09-27 ENCOUNTER — APPOINTMENT (OUTPATIENT)
Dept: PHYSICAL THERAPY | Age: 77
End: 2022-09-27
Attending: PHYSICAL MEDICINE & REHABILITATION
Payer: MEDICARE

## 2022-09-29 ENCOUNTER — APPOINTMENT (OUTPATIENT)
Dept: PHYSICAL THERAPY | Age: 77
End: 2022-09-29
Attending: PHYSICAL MEDICINE & REHABILITATION
Payer: MEDICARE

## 2023-01-26 ENCOUNTER — OFFICE VISIT (OUTPATIENT)
Dept: ORTHOPEDIC SURGERY | Age: 78
End: 2023-01-26
Payer: MEDICARE

## 2023-01-26 VITALS
BODY MASS INDEX: 43.24 KG/M2 | HEART RATE: 94 BPM | SYSTOLIC BLOOD PRESSURE: 136 MMHG | HEIGHT: 62 IN | DIASTOLIC BLOOD PRESSURE: 74 MMHG | OXYGEN SATURATION: 99 % | WEIGHT: 235 LBS | TEMPERATURE: 97 F

## 2023-01-26 DIAGNOSIS — M48.062 LUMBAR STENOSIS WITH NEUROGENIC CLAUDICATION: Primary | ICD-10-CM

## 2023-01-26 PROCEDURE — G8399 PT W/DXA RESULTS DOCUMENT: HCPCS | Performed by: PHYSICAL MEDICINE & REHABILITATION

## 2023-01-26 PROCEDURE — 3078F DIAST BP <80 MM HG: CPT | Performed by: PHYSICAL MEDICINE & REHABILITATION

## 2023-01-26 PROCEDURE — 1123F ACP DISCUSS/DSCN MKR DOCD: CPT | Performed by: PHYSICAL MEDICINE & REHABILITATION

## 2023-01-26 PROCEDURE — G8427 DOCREV CUR MEDS BY ELIG CLIN: HCPCS | Performed by: PHYSICAL MEDICINE & REHABILITATION

## 2023-01-26 PROCEDURE — 1090F PRES/ABSN URINE INCON ASSESS: CPT | Performed by: PHYSICAL MEDICINE & REHABILITATION

## 2023-01-26 PROCEDURE — 99214 OFFICE O/P EST MOD 30 MIN: CPT | Performed by: PHYSICAL MEDICINE & REHABILITATION

## 2023-01-26 PROCEDURE — 3075F SYST BP GE 130 - 139MM HG: CPT | Performed by: PHYSICAL MEDICINE & REHABILITATION

## 2023-01-26 PROCEDURE — G8417 CALC BMI ABV UP PARAM F/U: HCPCS | Performed by: PHYSICAL MEDICINE & REHABILITATION

## 2023-01-26 PROCEDURE — G8536 NO DOC ELDER MAL SCRN: HCPCS | Performed by: PHYSICAL MEDICINE & REHABILITATION

## 2023-01-26 PROCEDURE — G8432 DEP SCR NOT DOC, RNG: HCPCS | Performed by: PHYSICAL MEDICINE & REHABILITATION

## 2023-01-26 PROCEDURE — 1101F PT FALLS ASSESS-DOCD LE1/YR: CPT | Performed by: PHYSICAL MEDICINE & REHABILITATION

## 2023-01-26 RX ORDER — LATANOPROSTENE BUNOD 0.24 MG/ML
SOLUTION/ DROPS OPHTHALMIC
COMMUNITY
Start: 2022-11-29

## 2023-01-26 RX ORDER — LANOLIN ALCOHOL/MO/W.PET/CERES
325 CREAM (GRAM) TOPICAL DAILY
COMMUNITY
Start: 2022-11-03

## 2023-01-26 RX ORDER — PREGABALIN 50 MG/1
50 CAPSULE ORAL 2 TIMES DAILY
Qty: 60 CAPSULE | Refills: 2 | Status: SHIPPED | OUTPATIENT
Start: 2023-01-26

## 2023-01-26 NOTE — LETTER
1/26/2023    Patient: Valeri Ballard   YOB: 1945   Date of Visit: 1/26/2023     Uriah Castrejon MD  [address] [place] [id number]  Via Fax: [phone]    Dear Uriah Castrejon MD,      Thank you for referring Ms. Simona Verde to [facility] for evaluation. My notes for this consultation are attached. If you have questions, please do not hesitate to call me. I look forward to following your patient along with you.       Sincerely,    David Gardner MD

## 2023-01-26 NOTE — PROGRESS NOTES
Yulissa Albarran presents today for   Chief Complaint   Patient presents with    Back Pain     Lower      Leg Pain     Bilateral        Is someone accompanying this pt? no    Is the patient using any DME equipment during OV? no    Depression Screening:  3 most recent PHQ Screens 4/30/2019   Little interest or pleasure in doing things Not at all   Feeling down, depressed, irritable, or hopeless Not at all   Total Score PHQ 2 0       Learning Assessment:  Learning Assessment 4/22/2015   PRIMARY LEARNER Patient   HIGHEST LEVEL OF EDUCATION - PRIMARY LEARNER  -   BARRIERS PRIMARY LEARNER -   CO-LEARNER CAREGIVER -   PRIMARY LANGUAGE ENGLISH   LEARNER PREFERENCE PRIMARY VIDEOS     DEMONSTRATION   ANSWERED BY patient   RELATIONSHIP SELF       Abuse Screening:  Abuse Screening Questionnaire 3/23/2022   Do you ever feel afraid of your partner? N   Are you in a relationship with someone who physically or mentally threatens you? N   Is it safe for you to go home? Y       Fall Risk  Fall Risk Assessment, last 12 mths 3/23/2022   Able to walk? Yes   Fall in past 12 months? 0   Do you feel unsteady? 0   Are you worried about falling 0   Number of falls in past 12 months -   Fall with injury? -         Coordination of Care:  1. Have you been to the ER, urgent care clinic since your last visit? no  Hospitalized since your last visit? no    2. Have you seen or consulted any other health care providers outside of the [address] [place] [address] since your last visit? Yes, pcp and hematology Include any pap smears or colon screening.  no

## 2023-01-26 NOTE — PATIENT INSTRUCTIONS
Lumbar Spinal Stenosis: Care Instructions  Overview     Stenosis in the spine is a narrowing of the canal that is around the spinal cord and nerve roots in your back. It can happen as part of aging. Sometimes bone and other tissue grow into this canal and press on the nerves that branch out from the spinal cord. This can cause pain, numbness, and weakness. When it happens in the lower part of your back, it is called lumbar spinal stenosis. It can cause problems in the legs, feet, and rear end (buttocks). You may be able to get relief from the symptoms of spinal stenosis by taking medicine. Your doctor may suggest physical therapy and exercises to keep your spine strong and flexible. Some people try steroid shots to reduce swelling. If pain and numbness in your legs are still so bad that you cannot do your normal activities, you may need surgery. Follow-up care is a key part of your treatment and safety. Be sure to make and go to all appointments, and call your doctor if you are having problems. It's also a good idea to know your test results and keep a list of the medicines you take. How can you care for yourself at home? Take an over-the-counter pain medicine. Nonsteroidal anti-inflammatory drugs (NSAIDs) such as ibuprofen or naproxen seem to work best. But if you can't take NSAIDs, you can try acetaminophen. Be safe with medicines. Read and follow all instructions on the label. Do not take two or more pain medicines at the same time unless the doctor told you to. Many pain medicines have acetaminophen, which is Tylenol. Too much acetaminophen (Tylenol) can be harmful. Stay at a healthy weight. Being overweight puts extra strain on your spine. Change positions often when you sit or stand. This can ease pain. It may also reduce pressure on the spinal cord and its nerves. Avoid doing things that make your symptoms worse. Walking downhill and standing for a long time may cause pain.   Stretch and strengthen your back muscles as your doctor or physical therapist recommends. If your doctor says it is okay to do them, these exercises may help. Lie on your back with your knees bent. Gently pull one bent knee to your chest. Put that foot back on the floor, and then pull the other knee to your chest.  Do pelvic tilts. Lie on your back with your knees bent. Tighten your stomach muscles. Pull your belly button (navel) in and up toward your ribs. You should feel like your back is pressing to the floor and your hips and pelvis are slightly lifting off the floor. Hold for 6 seconds while breathing smoothly. Stand with your back flat against a wall. Slowly slide down until your knees are slightly bent. Hold for 10 seconds, then slide back up the wall. Remove or change anything in your house that may cause you to fall. Keep walkways clear of clutter, electrical cords, and throw rugs. When should you call for help? Call 911 anytime you think you may need emergency care. For example, call if:    You are unable to move a leg at all. Call your doctor now or seek immediate medical care if:    You have new or worse symptoms in your legs, belly, or buttocks. Symptoms may include:  Numbness or tingling. Weakness. Pain. You lose bladder or bowel control. Watch closely for changes in your health, and be sure to contact your doctor if:    You have a fever, lose weight, or don't feel well. You are not getting better as expected. Where can you learn more? Go to http://afshan-nikki.info/  Enter X327 in the search box to learn more about \"Lumbar Spinal Stenosis: Care Instructions. \"  Current as of: March 9, 2022               Content Version: 13.4  © 7425-0695 [employer]. Care instructions adapted under license by [employer] (which disclaims liability or warranty for this information).  If you have questions about a medical condition or this instruction, always ask your healthcare professional. [place] [address] any warranty or liability for your use of this information.

## 2023-01-26 NOTE — PROGRESS NOTES
[address], [address]  [place], [address] [place] [address] Street  Phone: [phone]  Fax: [phone]        Ruben Snellen  : 1945  PCP: Luis Frost MD    PROGRESS NOTE      ASSESSMENT AND PLAN    Diagnoses and all orders for this visit:    1. Lumbar stenosis with neurogenic claudication  -     AMB SUPPLY ORDER  -     pregabalin (Lyrica) 50 mg capsule; Take 1 Capsule by mouth two (2) times a day. Max Daily Amount: 100 mg. Matthew Ye is a [de-identified] y.o. female with DM, CKD,symptomatic lumbar stenosis, 5-minute walking tolerance. No significant improvement with PT, she is continuing home exercise. No benefit with gabapentin. .   Discussed cortisone injections briefly. Patient is not interested at this time. DME order for a walking rollator. DC Gabapentin due to ineffectiveness  Trial of Lyrica 50 mg BID. Continue as needed Voltaren Gel, discussed side effects are much less than oral NSAIDs. Follow-up and Dispositions    Return in about 2 months (around 3/26/2023) for medication management. HISTORY OF PRESENT ILLNESS      Matthew Ye is a [de-identified] y.o. female presents for follow up of back pain. LV 2022 for lumbar stenosis and sciatica, trial of Gabapentin 100 mg TID. Pt reports low back pain radiating into her buttocks and thighs bilaterally. Her pain is increased with standing and walking. Positive shopping cart sign. Can't stand at all. 5 min walking tolerance. She takes Gabapentin without much benefit. Pt mentions she occasionally forgets to takes a dose. She also uses Voltaren Gel as needed. Pain Assessment  2023   Location of Pain Back;Leg   Location Modifiers Left;Right   Severity of Pain 3   Quality of Pain Other (Comment)   Quality of Pain Comment it just lets me know that it's there   Duration of Pain Persistent   Frequency of Pain Constant   Aggravating Factors Walking;Standing; Other (Comment)   Aggravating Factors Comment sititng on the wrong chair   Limiting Behavior Yes   Relieving Factors Other (Comment); Rest   Relieving Factors Comment sitting   Result of Injury No            Onset of pain: injury 8/2021        Investigations:   L MRI 2/2022: stenosis L3/4/5, FA L5-S1  Spine surgery consult: none     Treatments:  Physical therapy: 7-9/2022 - some benefit  Spinal injections: no  Spinal surgery- no  Beneficial medications: none  Failed medications: Gabapentin - ineffective     Work Status: retired. Former   Pertinent PMHx:  DM, HTN, CKD, .      PHYSICAL EXAMINATION    Visit Vitals  /74 (BP 1 Location: Left upper arm, BP Patient Position: Sitting, BP Cuff Size: Large adult)   Pulse 94   Temp 97 °F (36.1 °C) (Temporal)   Ht 5' 2\" (1.575 m)   Wt 235 lb (106.6 kg)   SpO2 99% Comment: RA   BMI 42.98 kg/m²       Tender midline sacrum  LE strength intact  SLR negative  1+ non pitting edema               Written by Varun Barrera, as dictated by Harley Solis MD.

## 2023-05-10 ENCOUNTER — OFFICE VISIT (OUTPATIENT)
Age: 78
End: 2023-05-10
Payer: MEDICARE

## 2023-05-10 VITALS
RESPIRATION RATE: 18 BRPM | BODY MASS INDEX: 42.95 KG/M2 | TEMPERATURE: 97.5 F | OXYGEN SATURATION: 98 % | HEIGHT: 62 IN | HEART RATE: 89 BPM | WEIGHT: 233.4 LBS

## 2023-05-10 DIAGNOSIS — M48.062 LUMBAR STENOSIS WITH NEUROGENIC CLAUDICATION: ICD-10-CM

## 2023-05-10 PROCEDURE — 1123F ACP DISCUSS/DSCN MKR DOCD: CPT | Performed by: PHYSICAL MEDICINE & REHABILITATION

## 2023-05-10 PROCEDURE — 1090F PRES/ABSN URINE INCON ASSESS: CPT | Performed by: PHYSICAL MEDICINE & REHABILITATION

## 2023-05-10 PROCEDURE — G8427 DOCREV CUR MEDS BY ELIG CLIN: HCPCS | Performed by: PHYSICAL MEDICINE & REHABILITATION

## 2023-05-10 PROCEDURE — 99213 OFFICE O/P EST LOW 20 MIN: CPT | Performed by: PHYSICAL MEDICINE & REHABILITATION

## 2023-05-10 PROCEDURE — G8399 PT W/DXA RESULTS DOCUMENT: HCPCS | Performed by: PHYSICAL MEDICINE & REHABILITATION

## 2023-05-10 PROCEDURE — 1036F TOBACCO NON-USER: CPT | Performed by: PHYSICAL MEDICINE & REHABILITATION

## 2023-05-10 PROCEDURE — G8417 CALC BMI ABV UP PARAM F/U: HCPCS | Performed by: PHYSICAL MEDICINE & REHABILITATION

## 2023-05-10 RX ORDER — PREGABALIN 50 MG/1
CAPSULE ORAL
COMMUNITY
Start: 2023-03-15 | End: 2023-05-10

## 2023-05-10 RX ORDER — LATANOPROSTENE BUNOD 0.24 MG/ML
SOLUTION/ DROPS OPHTHALMIC
COMMUNITY
Start: 2023-05-09

## 2023-05-10 RX ORDER — FERROUS SULFATE 325(65) MG
TABLET ORAL
COMMUNITY
Start: 2023-03-10

## 2023-05-10 NOTE — PROGRESS NOTES
Tamiko Gill presents today for   Chief Complaint   Patient presents with    Back Problem    Back Pain       Is someone accompanying this pt? no    Is the patient using any DME equipment during OV? no    Depression Screening:  No flowsheet data found. Learning Assessment:  No flowsheet data found. Abuse Screening:  No flowsheet data found. Fall Risk  No flowsheet data found. OPIOID RISK TOOL  No flowsheet data found. Coordination of Care:  1. Have you been to the ER, urgent care clinic since your last visit? no  Hospitalized since your last visit? no    2. Have you seen or consulted any other health care providers outside of the [address] [place] [address] since your last visit? no Include any pap smears or colon screening.  no
ineffective      Work Status: retired. Former . Cares for spouse who had CVA  Pertinent PMHx:  DM, HTN, CKD, . PHYSICAL EXAMINATION    Pulse 89   Temp 97.5 °F (36.4 °C) (Temporal)   Resp 18   Ht 5' 2\" (1.575 m)   Wt 233 lb 6.4 oz (105.9 kg)   SpO2 98% Comment: RA  BMI 42.69 kg/m²     Non TTP lumbar spine  LE strength intact  SLR negative  Ambulatory without AD              Written by Aris Dominguez, as dictated by Jason Bui MD.  This note was created using Dragon transcription software and may contain unintended errors.

## 2023-10-30 ENCOUNTER — OFFICE VISIT (OUTPATIENT)
Age: 78
End: 2023-10-30
Payer: MEDICARE

## 2023-10-30 VITALS
BODY MASS INDEX: 42.69 KG/M2 | HEIGHT: 62 IN | TEMPERATURE: 98.3 F | WEIGHT: 232 LBS | HEART RATE: 84 BPM | OXYGEN SATURATION: 100 %

## 2023-10-30 DIAGNOSIS — M48.062 LUMBAR STENOSIS WITH NEUROGENIC CLAUDICATION: Primary | ICD-10-CM

## 2023-10-30 PROCEDURE — 99213 OFFICE O/P EST LOW 20 MIN: CPT | Performed by: PHYSICAL MEDICINE & REHABILITATION

## 2023-10-30 PROCEDURE — 1036F TOBACCO NON-USER: CPT | Performed by: PHYSICAL MEDICINE & REHABILITATION

## 2023-10-30 PROCEDURE — 1090F PRES/ABSN URINE INCON ASSESS: CPT | Performed by: PHYSICAL MEDICINE & REHABILITATION

## 2023-10-30 PROCEDURE — 1123F ACP DISCUSS/DSCN MKR DOCD: CPT | Performed by: PHYSICAL MEDICINE & REHABILITATION

## 2023-10-30 PROCEDURE — G8417 CALC BMI ABV UP PARAM F/U: HCPCS | Performed by: PHYSICAL MEDICINE & REHABILITATION

## 2023-10-30 PROCEDURE — G8427 DOCREV CUR MEDS BY ELIG CLIN: HCPCS | Performed by: PHYSICAL MEDICINE & REHABILITATION

## 2023-10-30 PROCEDURE — G8484 FLU IMMUNIZE NO ADMIN: HCPCS | Performed by: PHYSICAL MEDICINE & REHABILITATION

## 2023-10-30 PROCEDURE — G8399 PT W/DXA RESULTS DOCUMENT: HCPCS | Performed by: PHYSICAL MEDICINE & REHABILITATION

## 2023-10-30 RX ORDER — BLOOD-GLUCOSE METER
KIT MISCELLANEOUS
COMMUNITY
Start: 2023-09-18

## 2023-10-30 RX ORDER — NIFEDIPINE 30 MG/1
30 TABLET, FILM COATED, EXTENDED RELEASE ORAL DAILY
COMMUNITY
Start: 2023-09-14

## 2023-10-30 RX ORDER — GLUCAGON 3 MG/1
1 POWDER NASAL PRN
COMMUNITY
Start: 2023-09-15

## 2023-10-30 NOTE — PROGRESS NOTES
[facility]    [address], [address]  [facility], [address]   Phone: [phone]  Fax: [phone]        Giovani Brown  : 1945  PCP: Timur Gray MD    PROGRESS NOTE      ASSESSMENT AND PLAN    Regina Butt was seen today for lower back pain. Diagnoses and all orders for this visit:    Lumbar stenosis with neurogenic claudication  -     DME Order for (Specify) as OP        Dennis Perkins is a 78 y.o. female with lumbar stenosis. She feels she is learning to tolerate and accept her limitations. She takes rare analgesics. Given DME order for rollator. Advised topicals as first-line medication. Tylenol second-line. Gabapentin third-line. Discussed as needed follow-up in spine clinic. Patient would like to have follow-up appointment. Follow-up and Dispositions    Return in about 6 months (around 2024) for spine re-eval.           HISTORY OF PRESENT ILLNESS      Dennis Perkins is a 78 y.o. female presents for follow up of back pain. At , 2023, advised to continue activity and HEP as tolerated, also indicated that she may use Tylenol and Voltaren Gel PRN. Pt describes back and hip pain radiating to the bilateral buttocks and proximal thighs. Her pain is exacerbated by walking. Pt states that she does not take her Gabapentin regularly. Pt denies needing a refill of her Voltaren Gel. She also denies regularly using Tyelnol for her pain. Pt is still caring for her . Pt expressed that she would like prescription for a Rollator for her lumbar stenosis. 10/30/2023     2:21 PM   AMB PAIN ASSESSMENT   Location of Pain Back   Severity of Pain 4   Quality of Pain Other (Comment)   Duration of Pain Persistent   Frequency of Pain Intermittent   Aggravating Factors Walking   Limiting Behavior Yes   Relieving Factors Other (Comment)   Result of Injury No   Work-Related Injury No   Are there other pain locations you wish to document?

## 2023-10-30 NOTE — PROGRESS NOTES
Linda Barboza presents today for   Chief Complaint   Patient presents with    Lower Back Pain       Is someone accompanying this pt? no    Is the patient using any DME equipment during OV? no      Coordination of Care:  1. Have you been to the ER, urgent care clinic since your last visit? no  Hospitalized since your last visit? no    2. Have you seen or consulted any other health care providers outside of the [address] [place] [address] Avenue since your last visit? Yes, endocrinology Include any pap smears or colon screening.  no

## 2024-04-25 ENCOUNTER — OFFICE VISIT (OUTPATIENT)
Age: 79
End: 2024-04-25
Payer: MEDICARE

## 2024-04-25 VITALS
TEMPERATURE: 96.8 F | OXYGEN SATURATION: 98 % | HEART RATE: 74 BPM | BODY MASS INDEX: 41.77 KG/M2 | WEIGHT: 227 LBS | HEIGHT: 62 IN

## 2024-04-25 DIAGNOSIS — M48.062 LUMBAR STENOSIS WITH NEUROGENIC CLAUDICATION: Primary | ICD-10-CM

## 2024-04-25 PROCEDURE — 99213 OFFICE O/P EST LOW 20 MIN: CPT | Performed by: PHYSICAL MEDICINE & REHABILITATION

## 2024-04-25 PROCEDURE — 1090F PRES/ABSN URINE INCON ASSESS: CPT | Performed by: PHYSICAL MEDICINE & REHABILITATION

## 2024-04-25 PROCEDURE — 1123F ACP DISCUSS/DSCN MKR DOCD: CPT | Performed by: PHYSICAL MEDICINE & REHABILITATION

## 2024-04-25 PROCEDURE — G8399 PT W/DXA RESULTS DOCUMENT: HCPCS | Performed by: PHYSICAL MEDICINE & REHABILITATION

## 2024-04-25 PROCEDURE — G8428 CUR MEDS NOT DOCUMENT: HCPCS | Performed by: PHYSICAL MEDICINE & REHABILITATION

## 2024-04-25 PROCEDURE — 1036F TOBACCO NON-USER: CPT | Performed by: PHYSICAL MEDICINE & REHABILITATION

## 2024-04-25 PROCEDURE — G8417 CALC BMI ABV UP PARAM F/U: HCPCS | Performed by: PHYSICAL MEDICINE & REHABILITATION

## 2024-04-25 NOTE — PROGRESS NOTES
Tamara Gold presents today for   Chief Complaint   Patient presents with    Lower Back Pain       Is someone accompanying this pt? no    Is the patient using any DME equipment during OV? no      Coordination of Care:  1. Have you been to the ER, urgent care clinic since your last visit? no  Hospitalized since your last visit? no    2. Have you seen or consulted any other health care providers outside of the [facility] since your last visit? Yes, endocrinology, breast specialist Include any pap smears or colon screening. no

## 2024-04-25 NOTE — PROGRESS NOTES
[facility]    [address] [place] [address], [address]  [place] [address]  Phone: [phone]  Fax: [phone]        Tamara Gold  : 1945  PCP: Jojo Johnson MD    PROGRESS NOTE      ASSESSMENT AND PLAN    Tamara was seen today for lower back pain.    Diagnoses and all orders for this visit:    Lumbar stenosis with neurogenic claudication        Tamara Gold is a 79 y.o. female with lumbar stenosis.  Clinically feels that she is managing her symptoms without gabapentin.  She primarily cares for her .  She does not feel the need for additional treatment options such as medication or injection at this time.  Continue APAP PRN for pain.   Continue activity as tolerated.  I will release her back to her primary.  Follow-up as needed.    Follow-up and Dispositions    Return if symptoms worsen or fail to improve.           HISTORY OF PRESENT ILLNESS    Tamara Gold is a 79 y.o. female presents for follow up of back pain. At her last OV (10/2023), advised topicals as first-line pain medication, OTC APAP second, and Gabapentin third. Patient given DME order for rollator.    Patient describes consistent back pain radiating into her buttocks and thighs. She states that she is able to perform her daily activities with manageable pain. She reports a walking tolerance of approximately 10 minutes. Positive shopping cart sign.    Patient complains of tingling sensation in her lower legs. Endorses occasional tingling in her feet.    Patient admits to taking Tylenol PRN for pain with benefit. She states she hasn't taken Gabapentin since her LV.     Patient reports adhering to routine HEP by performing stretches and walking.    Patient denies any major medical problems, hospitalizations, or surgeries since last OV.            2024     2:41 PM   AMB PAIN ASSESSMENT   Location of Pain Back   Severity of Pain 4   Quality of Pain Dull   Duration of Pain Persistent

## 2024-10-18 NOTE — PROGRESS NOTES
Instructions for your procedure at [facility]      Today's Date: 10/18/2024      Patient's Name: Tamara Gold      Procedure Date: 10/25/2024        Please enter the main entrance of the hospital and check-in at the  located in the lobby.      Do NOT eat or drink anything, including candy, gum, or ice chips after midnight prior to your procedure, unless it is part of your prep.  Brush your teeth before coming to the hospital.You may swish with water, but do not swallow.  No smoking/Vaping/E-Cigarettes 24 hours prior to the day of procedure.  No alcohol 24 hours prior to the day of procedure.  No recreational drugs for one week prior to the day of procedure.  Bring Photo ID, Insurance information, and Co-pay if required on day of procedure.  Bring in pertinent legal documents, such as, Medical Power of , DNR, Advance Directive, etc.  Leave all other valuables, including money/purse, at home.  Remove jewelry, including ALL body piercings, nail polish, acrylic nails, and makeup (including mascara); no lotions, powders, deodorant, and/or perfume/cologne/after shave on the skin.  Glasses and dentures may be worn to the hospital.  They must be removed prior to procedure. Please bring case/container for glasses or dentures.  11. Contacts should not be worn on day of procedure.   12. Call the office ([phone]) if you have symptoms of a cold or illness within 24-48 hours prior to your procedure.   13. AN ADULT (relative or friend 18 years or older) MUST DRIVE YOU HOME AFTER YOUR PROCEDURE.   14. Please make arrangements for a responsible adult (18 years or older) to be with you for 24 hours after your procedure.   15. TWO VISITORS will be allowed in the waiting area during your procedure.       Special Instructions:      Bring list of CURRENT medications.  Follow instructions from the office regarding Bowel Prep, Vitamins, Iron, Blood Thinners, Insulin, Seizure, and Blood

## 2024-10-24 ENCOUNTER — ANESTHESIA EVENT (OUTPATIENT)
Facility: HOSPITAL | Age: 79
End: 2024-10-24
Payer: MEDICARE

## 2024-10-25 ENCOUNTER — ANESTHESIA (OUTPATIENT)
Facility: HOSPITAL | Age: 79
End: 2024-10-25
Payer: MEDICARE

## 2024-10-25 ENCOUNTER — HOSPITAL ENCOUNTER (OUTPATIENT)
Facility: HOSPITAL | Age: 79
Setting detail: OUTPATIENT SURGERY
Discharge: HOME | End: 2024-10-25
Attending: INTERNAL MEDICINE | Admitting: INTERNAL MEDICINE
Payer: MEDICARE

## 2024-10-25 VITALS
OXYGEN SATURATION: 96 % | RESPIRATION RATE: 17 BRPM | HEART RATE: 72 BPM | BODY MASS INDEX: 39.67 KG/M2 | TEMPERATURE: 97.6 F | DIASTOLIC BLOOD PRESSURE: 57 MMHG | HEIGHT: 63 IN | SYSTOLIC BLOOD PRESSURE: 113 MMHG | WEIGHT: 223.9 LBS

## 2024-10-25 LAB
GLUCOSE BLD STRIP.AUTO-MCNC: 113 MG/DL (ref 70–110)
GLUCOSE BLD STRIP.AUTO-MCNC: 126 MG/DL (ref 70–110)

## 2024-10-25 PROCEDURE — 82962 GLUCOSE BLOOD TEST: CPT

## 2024-10-25 PROCEDURE — 88305 TISSUE EXAM BY PATHOLOGIST: CPT

## 2024-10-25 PROCEDURE — 7100000010 HC PHASE II RECOVERY - FIRST 15 MIN: Performed by: INTERNAL MEDICINE

## 2024-10-25 PROCEDURE — 2709999900 HC NON-CHARGEABLE SUPPLY: Performed by: INTERNAL MEDICINE

## 2024-10-25 PROCEDURE — 2500000003 HC RX 250 WO HCPCS: Performed by: NURSE ANESTHETIST, CERTIFIED REGISTERED

## 2024-10-25 PROCEDURE — 3600007512: Performed by: INTERNAL MEDICINE

## 2024-10-25 PROCEDURE — 3600007502: Performed by: INTERNAL MEDICINE

## 2024-10-25 PROCEDURE — 7100000000 HC PACU RECOVERY - FIRST 15 MIN: Performed by: INTERNAL MEDICINE

## 2024-10-25 PROCEDURE — 3700000001 HC ADD 15 MINUTES (ANESTHESIA): Performed by: INTERNAL MEDICINE

## 2024-10-25 PROCEDURE — 6370000000 HC RX 637 (ALT 250 FOR IP): Performed by: INTERNAL MEDICINE

## 2024-10-25 PROCEDURE — 6360000002 HC RX W HCPCS: Performed by: NURSE ANESTHETIST, CERTIFIED REGISTERED

## 2024-10-25 PROCEDURE — 3700000000 HC ANESTHESIA ATTENDED CARE: Performed by: INTERNAL MEDICINE

## 2024-10-25 PROCEDURE — 2580000003 HC RX 258: Performed by: INTERNAL MEDICINE

## 2024-10-25 RX ORDER — ONDANSETRON 2 MG/ML
4 INJECTION INTRAMUSCULAR; INTRAVENOUS
Status: CANCELLED | OUTPATIENT
Start: 2024-10-25 | End: 2024-10-26

## 2024-10-25 RX ORDER — DIPHENHYDRAMINE HYDROCHLORIDE 50 MG/ML
12.5 INJECTION INTRAMUSCULAR; INTRAVENOUS
Status: CANCELLED | OUTPATIENT
Start: 2024-10-25 | End: 2024-10-26

## 2024-10-25 RX ORDER — SODIUM CHLORIDE 9 MG/ML
INJECTION, SOLUTION INTRAVENOUS CONTINUOUS
Status: DISCONTINUED | OUTPATIENT
Start: 2024-10-25 | End: 2024-10-25 | Stop reason: HOSPADM

## 2024-10-25 RX ORDER — SIMETHICONE 40MG/0.6ML
SUSPENSION, DROPS(FINAL DOSAGE FORM)(ML) ORAL PRN
Status: DISCONTINUED | OUTPATIENT
Start: 2024-10-25 | End: 2024-10-25 | Stop reason: ALTCHOICE

## 2024-10-25 RX ORDER — SODIUM CHLORIDE 0.9 % (FLUSH) 0.9 %
5-40 SYRINGE (ML) INJECTION PRN
Status: CANCELLED | OUTPATIENT
Start: 2024-10-25

## 2024-10-25 RX ORDER — FAMOTIDINE 20 MG/1
20 TABLET, FILM COATED ORAL ONCE
Status: DISCONTINUED | OUTPATIENT
Start: 2024-10-25 | End: 2024-10-25 | Stop reason: HOSPADM

## 2024-10-25 RX ORDER — SODIUM CHLORIDE 9 MG/ML
INJECTION, SOLUTION INTRAVENOUS PRN
Status: CANCELLED | OUTPATIENT
Start: 2024-10-25

## 2024-10-25 RX ORDER — SODIUM CHLORIDE 0.9 % (FLUSH) 0.9 %
5-40 SYRINGE (ML) INJECTION EVERY 12 HOURS SCHEDULED
Status: CANCELLED | OUTPATIENT
Start: 2024-10-25

## 2024-10-25 RX ORDER — LIDOCAINE HYDROCHLORIDE 20 MG/ML
INJECTION, SOLUTION EPIDURAL; INFILTRATION; INTRACAUDAL; PERINEURAL
Status: DISCONTINUED | OUTPATIENT
Start: 2024-10-25 | End: 2024-10-25 | Stop reason: SDUPTHER

## 2024-10-25 RX ORDER — PROPOFOL 10 MG/ML
INJECTION, EMULSION INTRAVENOUS
Status: DISCONTINUED | OUTPATIENT
Start: 2024-10-25 | End: 2024-10-25 | Stop reason: SDUPTHER

## 2024-10-25 RX ORDER — FENTANYL CITRATE 50 UG/ML
25 INJECTION, SOLUTION INTRAMUSCULAR; INTRAVENOUS EVERY 5 MIN PRN
Status: CANCELLED | OUTPATIENT
Start: 2024-10-25

## 2024-10-25 RX ORDER — LIDOCAINE HYDROCHLORIDE 10 MG/ML
1 INJECTION, SOLUTION EPIDURAL; INFILTRATION; INTRACAUDAL; PERINEURAL
Status: DISCONTINUED | OUTPATIENT
Start: 2024-10-25 | End: 2024-10-25 | Stop reason: HOSPADM

## 2024-10-25 RX ORDER — NALOXONE HYDROCHLORIDE 0.4 MG/ML
INJECTION, SOLUTION INTRAMUSCULAR; INTRAVENOUS; SUBCUTANEOUS PRN
Status: CANCELLED | OUTPATIENT
Start: 2024-10-25

## 2024-10-25 RX ORDER — SODIUM CHLORIDE, SODIUM LACTATE, POTASSIUM CHLORIDE, CALCIUM CHLORIDE 600; 310; 30; 20 MG/100ML; MG/100ML; MG/100ML; MG/100ML
INJECTION, SOLUTION INTRAVENOUS CONTINUOUS
Status: DISCONTINUED | OUTPATIENT
Start: 2024-10-25 | End: 2024-10-25 | Stop reason: HOSPADM

## 2024-10-25 RX ADMIN — LIDOCAINE HYDROCHLORIDE 20 MG: 20 INJECTION, SOLUTION EPIDURAL; INFILTRATION; INTRACAUDAL; PERINEURAL at 07:57

## 2024-10-25 RX ADMIN — SODIUM CHLORIDE: 9 INJECTION, SOLUTION INTRAVENOUS at 07:52

## 2024-10-25 RX ADMIN — PROPOFOL 150 MCG/KG/MIN: 10 INJECTION, EMULSION INTRAVENOUS at 07:57

## 2024-10-25 ASSESSMENT — PAIN - FUNCTIONAL ASSESSMENT
PAIN_FUNCTIONAL_ASSESSMENT: NONE - DENIES PAIN
PAIN_FUNCTIONAL_ASSESSMENT: 0-10

## 2024-10-25 NOTE — H&P
GASTROENTEROLOGY Pre-Procedure H and P      Impression/Plan:   1. This patient is consented for a colonoscopy for chronic diarrhea and fecal incontinence.      Chief Complaint: chronic diarrhea and fecal incontinence.      HPI:  Tamara Gold is a 79 y.o. female who presents for a colonoscopy for chronic diarrhea and fecal incontinence.      PMH:   Past Medical History:   Diagnosis Date    Abnormal stress echo 09/24/2010    Normal study after maximal exercise.  No symptoms.  Ex time 6 min.  EF 60%.      BMI 39.0-39.9,adult 5/15/2017    Chest pain, atypical     CKD (chronic kidney disease)     Constipation     Diabetes (HCC)     Ectopic pregnancy     Essential hypertension 2/11/2011    History of Holter monitoring 10/14/2013    Baseline sinus rhythm w/extremely infrequent PVCs, which are likely cause of patient's symptoms.    Hyperlipidemia     Hypertension     Menopause        PSH:   Past Surgical History:   Procedure Laterality Date    COLONOSCOPY N/A 9/15/2016    COLONOSCOPYwith polypectomies performed by Jarret Oglesby MD at [facility] ENDOSCOPY    COLONOSCOPY N/A 1/5/2021    COLONOSCOPY without Sedation and polypectomies performed by Myron Reynoso MD at [facility] ENDOSCOPY    ECTOPIC PREGNANCY SURGERY      HYSTERECTOMY (CERVIX STATUS UNKNOWN)         Social HX:   Social History     Socioeconomic History    Marital status:      Spouse name: Not on file    Number of children: Not on file    Years of education: Not on file    Highest education level: Not on file   Occupational History    Not on file   Tobacco Use    Smoking status: Former    Smokeless tobacco: Never   Vaping Use    Vaping status: Never Used   Substance and Sexual Activity    Alcohol use: No    Drug use: No    Sexual activity: Not on file   Other Topics Concern    Not on file   Social History Narrative    Not on file     Social Determinants of Health     Financial Resource Strain: Not on file   Food Insecurity: Not on file   Transportation Needs:  Not on file   Physical Activity: Not on file   Stress: Not on file   Social Connections: Not on file   Intimate Partner Violence: Not on file   Housing Stability: Not on file       FHX:   Family History   Problem Relation Age of Onset    Heart Failure Brother     Seizures Sister     Diabetes Sister     Stroke Sister     Hypertension Sister     HIV/AIDS Brother     Diabetes Mother     Other Mother         CAD    Heart Attack Mother     Diabetes Sister        Allergy:   Allergies   Allergen Reactions    Ace Inhibitors Anaphylaxis    Fexofenadine-Pseudoephed Er Shortness Of Breath    Pseudoephedrine Hcl      Other reaction(s): Shortness of breath, Shortness of Breath, wheezing/sob    Peanut Butter Flavor Swelling    Simvastatin Other (See Comments)     Intolerance, muscle ache    Sitagliptin      Other reaction(s): Unknown (comments)       Home Medications:     Prior to Admission medications    Medication Sig Start Date End Date Taking? Authorizing Provider   NIFEdipine (ADALAT CC) 30 MG extended release tablet Take 2 tablets by mouth daily 9/14/23   Samra Aquino MD   FREESTYLE LITE strip  9/18/23   Samra Aquino MD   Glucagon (BAQSIMI ONE PACK) 3 MG/DOSE POWD 1 Package by Nasal route as needed 9/15/23   Samra Aquino MD   nystatin-triamcinolone (MYCOLOG II) 578797-6.1 UNIT/GM-% cream  9/18/23   Samra Aquino MD   FEROSUL 325 (65 Fe) MG tablet Take 1 tablet by mouth daily (with breakfast) 3/10/23   Samra Aquino MD   VYZULTA 0.024 % SOLN Place 1 drop into the right eye every evening 5/9/23   Samra Aquino MD   Lancets MISC Lancets for Free Style lite glucose meter. Test blood sugar 2 times a day. Dx; E11.65 6/28/16   Automatic Reconciliation, Ar   acetaminophen (TYLENOL) 325 MG tablet Take 3 tablets by mouth every 6 hours as needed 6/24/22   Automatic Reconciliation, Ar   aspirin 81 MG EC tablet Take 1 tablet by mouth daily 5/17/18   Automatic Reconciliation, Ar

## 2024-10-25 NOTE — ANESTHESIA PRE PROCEDURE
\"PROTIME\", \"INR\", \"APTT\"    HCG (If Applicable): No results found for: \"PREGTESTUR\", \"PREGSERUM\", \"HCG\", \"HCGQUANT\"     ABGs: No results found for: \"PHART\", \"PO2ART\", \"UNW4HRB\", \"JCV5WDY\", \"BEART\", \"F7DQNVUL\"     Type & Screen (If Applicable):  No results found for: \"ABORH\", \"LABANTI\"    Drug/Infectious Status (If Applicable):  No results found for: \"HIV\", \"HEPCAB\"    COVID-19 Screening (If Applicable):   Lab Results   Component Value Date/Time    COVID19 Not Detected 12/31/2020 12:50 PM           Anesthesia Evaluation  Patient summary reviewed  Airway: Mallampati: II     Neck ROM: limited  Mouth opening: > = 3 FB   Dental:    (+) poor dentition      Pulmonary:normal exam                               Cardiovascular:    (+) hypertension:                  Neuro/Psych:               GI/Hepatic/Renal:             Endo/Other:    (+) DiabetesType II DM.                 Abdominal:             Vascular:          Other Findings:       Anesthesia Plan      MAC     ASA 2             Anesthetic plan and risks discussed with patient.                    LUPE BORGES MD   10/25/2024

## 2024-10-25 NOTE — ANESTHESIA POSTPROCEDURE EVALUATION
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) [E11.9, Z79.4])      (Chronic diarrhea [K52.9])      (Screening for colorectal cancer [Z12.11, Z12.12])    Surgeons: Myron Reynoso MD Responsible Provider: Kaleigh Scott MD    Anesthesia Type: MAC ASA Status: 2            Anesthesia Type: MAC    Rafy Phase I: Rafy Score: 10    Rafy Phase II: Rafy Score: 10    Anesthesia Post Evaluation    Patient location during evaluation: bedside  Patient participation: complete - patient participated  Airway patency: patent  Cardiovascular status: hemodynamically stable  Respiratory status: acceptable  Hydration status: stable    No notable events documented.

## 2024-10-25 NOTE — DISCHARGE INSTRUCTIONS
Learning About Colonoscopy  What is a colonoscopy?     A colonoscopy is a test (also called a procedure) that lets a doctor look inside your large intestine. The doctor uses a thin, lighted tube called a colonoscope. The doctor uses it to look for small growths called polyps, colon or rectal cancer (colorectal cancer), or other problems like bleeding.  During the procedure, the doctor can take samples of tissue. The samples can then be checked for cancer or other conditions. The doctor can also take out polyps.  How is a colonoscopy done?  This procedure is done in a doctor's office or a clinic or hospital. You will get medicine to help you relax and not feel pain. Some people find that they don't remember having the test because of the medicine.  The doctor gently moves the colonoscope, or scope, through the colon. The scope is also a small video camera. It lets the doctor see the colon and take pictures.  How do you prepare for the procedure?  You need to clean out your colon before the procedure so the doctor can see your colon. This depends on which \"colon prep\" your doctor recommends.  To clean out your colon, you'll do a \"colon prep\" before the test. This means you stop eating solid foods and drink only clear liquids. You can have water, tea, coffee, clear juices, clear broths, flavored ice pops, and gelatin (such as Jell-O). Do not drink anything red or purple.  The day or night before the procedure, you drink a large amount of a special liquid. This causes loose, frequent stools. You will go to the bathroom a lot. Your doctor may have you drink part of the liquid the evening before and the rest on the day of the test. It's very important to drink all of the liquid. If you have problems drinking it, call your doctor.  Arrange to have someone take you home after the test.  What can you expect after a colonoscopy?  Your doctor will tell you when you can eat and do your usual activities.  Drink a lot of  fever.     You have nausea or vomiting.     You have a change in bowel habits (new constipation or diarrhea).     Your symptoms get worse or are not improving as expected.   Where can you learn more?  Go to https://www.[employer].net/patientEd and enter C571 to learn more about \"Colon Polyps: Care Instructions.\"  Current as of: October 19, 2023  Content Version: 14.2  © 2024 [employer].   Care instructions adapted under license by [employer]. If you have questions about a medical condition or this instruction, always ask your healthcare professional. [employer] disclaims any warranty or liability for your use of this information.

## (undated) DEVICE — SYRINGE MED 25GA 3ML L5/8IN SUBQ PLAS W/ DETACH NDL SFTY

## (undated) DEVICE — LINER SUCT CANSTR 3000CC PLAS SFT PRE ASSEMB W/OUT TBNG W/

## (undated) DEVICE — FORCEPS BX L240CM JAW DIA2.8MM L CAP W/ NDL MIC MESH TOOTH

## (undated) DEVICE — SNARE POLYP M W27MMXL240CM OVL STIFF DISP CAPTIVATOR

## (undated) DEVICE — UNDERPAD INCONT W23XL36IN STD BLU POLYPR BK FLUF SFT

## (undated) DEVICE — CATH IV SAFE STR 22GX1IN BLU -- PROTECTIV PLUS

## (undated) DEVICE — CANNULA ORIG TL CLR W FOAM CUSHIONS AND 14FT SUPL TB 3 CHN

## (undated) DEVICE — GAUZE,SPONGE,4"X4",16PLY,STRL,LF,10/TRAY: Brand: MEDLINE

## (undated) DEVICE — ENDOSCOPY PUMP TUBING/ CAP SET: Brand: ERBE

## (undated) DEVICE — TRAP SPEC POLYP REM STRNR CLN DSGN MAGNIFYING WIND DISP

## (undated) DEVICE — GOWN PLASTIC FILM THMBHKS UNIV BLUE: Brand: CARDINAL HEALTH

## (undated) DEVICE — SOLUTION IRRIG 1000ML H2O STRL BLT

## (undated) DEVICE — ELECTRODE PT RET AD L9FT HI MOIST COND ADH HYDRGEL CORDED

## (undated) DEVICE — SYRINGE MED 10ML LUERLOCK TIP W/O SFTY DISP

## (undated) DEVICE — CATHETER SUCT TR FL TIP 14FR W/ O CTRL

## (undated) DEVICE — SYRINGE MED 50ML LUERSLIP TIP

## (undated) DEVICE — TRAP SPEC COLL POLYP POLYSTYR --

## (undated) DEVICE — SNARE ENDO 2.4MMX230CM -- COLD EXACTO

## (undated) DEVICE — MEDI-VAC NON-CONDUCTIVE SUCTION TUBING: Brand: CARDINAL HEALTH

## (undated) DEVICE — CANNULA NSL AD TBNG L14FT STD PVC O2 CRV CONN NONFLARED NSL

## (undated) DEVICE — YANKAUER,SMOOTH HANDLE,HIGH CAPACITY: Brand: MEDLINE INDUSTRIES, INC.

## (undated) DEVICE — Device

## (undated) DEVICE — SNARE VASC L240CM LOOP W10MM SHTH DIA2.4MM RND STIFF CLD

## (undated) DEVICE — SYRINGE 20ML LL S/C 50